# Patient Record
Sex: MALE | Race: WHITE | NOT HISPANIC OR LATINO | Employment: OTHER | URBAN - METROPOLITAN AREA
[De-identification: names, ages, dates, MRNs, and addresses within clinical notes are randomized per-mention and may not be internally consistent; named-entity substitution may affect disease eponyms.]

---

## 2018-02-01 DIAGNOSIS — E78.2 MIXED HYPERLIPIDEMIA: ICD-10-CM

## 2018-02-01 RX ORDER — FENOFIBRIC ACID 135 MG/1
1 CAPSULE, DELAYED RELEASE ORAL DAILY
Qty: 60 CAPSULE | Refills: 0 | Status: SHIPPED | OUTPATIENT
Start: 2018-02-01 | End: 2018-11-07

## 2018-02-01 NOTE — TELEPHONE ENCOUNTER
Patient is overdue for OV and fasting labs (due 12/30/17)  Med and pharm loaded for PCP  Reminder placed on RX  Manny Trevino, RN, BSN

## 2018-05-01 ENCOUNTER — OFFICE VISIT (OUTPATIENT)
Dept: URGENT CARE | Facility: URGENT CARE | Age: 63
End: 2018-05-01
Payer: COMMERCIAL

## 2018-05-01 VITALS
TEMPERATURE: 98.1 F | HEART RATE: 65 BPM | DIASTOLIC BLOOD PRESSURE: 94 MMHG | SYSTOLIC BLOOD PRESSURE: 141 MMHG | BODY MASS INDEX: 36.88 KG/M2 | WEIGHT: 257 LBS

## 2018-05-01 DIAGNOSIS — M10.9 GOUT OF LEFT FOOT, UNSPECIFIED CAUSE, UNSPECIFIED CHRONICITY: Primary | ICD-10-CM

## 2018-05-01 PROCEDURE — 99213 OFFICE O/P EST LOW 20 MIN: CPT | Performed by: PHYSICIAN ASSISTANT

## 2018-05-01 RX ORDER — INDOMETHACIN 50 MG/1
50 CAPSULE ORAL
Qty: 30 CAPSULE | Refills: 0 | Status: SHIPPED | OUTPATIENT
Start: 2018-05-01 | End: 2018-11-07

## 2018-05-01 NOTE — MR AVS SNAPSHOT
After Visit Summary   5/1/2018    Martin Flaherty    MRN: [id number]           Patient Information     Date Of Birth          1955        Visit Information        Provider Department      5/1/2018 5:50 PM Farrah Shaffer PA-C [facility]        Today's Diagnoses     Gout of left foot, unspecified cause, unspecified chronicity    -  1      Care Instructions      Gout Diet  Gout is a painful condition caused by an excess of uric acid, a waste product made by the body. Uric acid forms crystals that collect in the joints. The immune response to these crystals brings on symptoms of joint pain and swelling. This is called a gout attack. Often, medications and diet changes are combined to manage gout. Below are some guidelines for changing your diet to help you manage gout and prevent attacks. Your health care provider will help you determine the best eating plan for you.     Eating to manage gout  Weight loss for those who are overweight may help reduce gout attacks.  Eat less of these foods  Eating too many foods containing purines may raise the levels of uric acid in your body. This raises your risk for a gout attack. Try to limit these foods and drinks:    Alcohol, such as beer and red wine. You may be told to avoid alcohol completely.    Soft drinks that contain sugar or high fructose corn syrup    Certain fish, including anchovies, sardines, fish eggs, and herring    Shellfish    Certain meats, such as red meat, hot dogs, luncheon meats, and turkey    Organ meats, such as liver, kidneys, and sweetbreads    Legumes, such as dried beans and peas    Other high fat foods such as gravy, whole milk, and high fat cheeses    Vegetables such as asparagus, cauliflower, spinach, and mushrooms used to be thought to contribute to an increased risk for a gout attack, but recent studies show that high purine vegetables don't increase the risk for a gout attack.  Eat more  of these foods  Other foods may be helpful for people with gout. Add some of these foods to your diet:    Cherries contain chemicals that may lower uric acid.    Omega fatty acids. These are found in some fatty fish such as salmon, certain oils (flax, olive, or nut), and nuts themselves. Omega fatty acids may help prevent inflammation due to gout.    Dairy products that are low-fat or fat-free, such as cheese and yogurt    Complex carbohydrate foods, including whole grains, brown rice, oats, and beans    Coffee, in moderation    Water, approximately 64 ounces per day  Follow-up care  Follow up with your healthcare provider as advised.  When to seek medical advice  Call your healthcare provider right away if any of these occur:    Return of gout symptoms, usually at night:    Severe pain, swelling, and heat in a joint, especially the base of the big toe    Affected joint is hard to move    Skin of the affected joint is purple or red    Fever of 100.4 F (38 C) or higher    Pain that doesn't get better even with prescribed medicine   Date Last Reviewed: 1/12/2016 2000-2017 [employer]. [address] [place] [address]. All rights reserved. This information is not intended as a substitute for professional medical care. Always follow your healthcare professional's instructions.                Follow-ups after your visit        Who to contact     If you have questions or need follow up information about today's clinic visit or your schedule please contact [facility] directly at [phone].  Normal or non-critical lab and imaging results will be communicated to you by MyChart, letter or phone within 4 business days after the clinic has received the results. If you do not hear from us within 7 days, please contact the clinic through MyChart or phone. If you have a critical or abnormal lab result, we will notify you by phone as soon as possible.  Submit refill requests  "through [employer] or call your pharmacy and they will forward the refill request to us. Please allow 3 business days for your refill to be completed.          Additional Information About Your Visit        LPATHharSpruik Information     [employer] lets you send messages to your doctor, view your test results, renew your prescriptions, schedule appointments and more. To sign up, go to [email or web address]/[employer] . Click on \"Log in\" on the left side of the screen, which will take you to the Welcome page. Then click on \"Sign up Now\" on the right side of the page.     You will be asked to enter the access code listed below, as well as some personal information. Please follow the directions to create your username and password.     Your access code is: [email or web address]  Expires: 2018  7:23 PM     Your access code will  in 90 days. If you need help or a new code, please call your [facility] clinic or [phone].        Care EveryWhere ID     This is your Care EveryWhere ID. This could be used by other organizations to access your [facility] medical records  [id number]        Your Vitals Were     Pulse Temperature BMI (Body Mass Index)             65 98.1  F (36.7  C) (Tympanic) 36.88 kg/m2          Blood Pressure from Last 3 Encounters:   18 (!) 141/94   16 117/78   16 118/84    Weight from Last 3 Encounters:   18 257 lb (116.6 kg)   16 250 lb (113.4 kg)   16 249 lb 12.8 oz (113.3 kg)              Today, you had the following     No orders found for display         Today's Medication Changes          These changes are accurate as of 18  7:23 PM.  If you have any questions, ask your nurse or doctor.               Start taking these medicines.        Dose/Directions    indomethacin 50 MG capsule   Commonly known as:  INDOCIN   Used for:  Gout of left foot, unspecified cause, unspecified chronicity   Started by:  Farrah Shaffer PA-C        Dose:  50 mg   Take 1 capsule (50 mg) by " mouth 3 times daily (with meals)   Quantity:  30 capsule   Refills:  0            Where to get your medicines      These medications were sent to [id number] - [place], [place] - [address] [facility]  [address] [facility] [address]     Phone:  [phone]     indomethacin 50 MG capsule                Primary Care Provider Office Phone # Fax #    Shane Zamarripa [phone]       [facility] [address]  [facility] [address]        Equal Access to Services     [facility]: Hadii aad ku hadasho Soomaali, waaxda luqadaha, qaybta kaalmada adeegyada, waxay idiin hayaan adeeg kharash labuddy . So [facility] [place] [facility] [phone].    ATENCIÓN: Si habla español, tiene a parra disposición servicios gratuitos de asistencia lingüística. [facility] [phone].    We comply with applicable federal civil rights laws and [place] laws. We do not discriminate on the basis of race, color, national origin, age, disability, sex, sexual orientation, or gender identity.            Thank you!     Thank you for choosing [facility]  for your care. Our goal is always to provide you with excellent care. Hearing back from our patients is one way we can continue to improve our services. Please take a few minutes to complete the written survey that you may receive in the mail after your visit with us. Thank you!             Your Updated Medication List - Protect others around you: Learn how to safely use, store and throw away your medicines at www.disposemymeds.org.          This list is accurate as of 5/1/18  7:23 PM.  Always use your most recent med list.                   Brand Name Dispense Instructions for use Diagnosis    Fenofibric Acid 135 MG Cpdr     60 capsule    Take 1 capsule by mouth daily    Mixed hyperlipidemia       flecainide 50 MG tablet    TAMBOCOR    270 tablet    Take 1.5 tablets (75 mg) by mouth 2 times daily    Paroxysmal supraventricular  tachycardia (H)       indomethacin 50 MG capsule    INDOCIN    30 capsule    Take 1 capsule (50 mg) by mouth 3 times daily (with meals)    Gout of left foot, unspecified cause, unspecified chronicity       metoprolol succinate 25 MG 24 hr tablet    TOPROL-XL    90 tablet    Take 1 tablet (25 mg) by mouth daily    Paroxysmal supraventricular tachycardia (H)       omega-3 acid ethyl esters 1 g capsule    LOVAZA    360 capsule    Take 4 capsules by mouth daily with food    Hyperlipidemia LDL goal <130       omeprazole 20 MG CR capsule    priLOSEC    90 capsule    Take 1 capsule (20 mg) by mouth daily    Gastroesophageal reflux disease without esophagitis       simvastatin 40 MG tablet    ZOCOR    90 tablet    Take 1 tablet (40 mg) by mouth At Bedtime    Hyperlipidemia LDL goal <130

## 2018-05-02 NOTE — PATIENT INSTRUCTIONS
Gout Diet  Gout is a painful condition caused by an excess of uric acid, a waste product made by the body. Uric acid forms crystals that collect in the joints. The immune response to these crystals brings on symptoms of joint pain and swelling. This is called a gout attack. Often, medications and diet changes are combined to manage gout. Below are some guidelines for changing your diet to help you manage gout and prevent attacks. Your health care provider will help you determine the best eating plan for you.     Eating to manage gout  Weight loss for those who are overweight may help reduce gout attacks.  Eat less of these foods  Eating too many foods containing purines may raise the levels of uric acid in your body. This raises your risk for a gout attack. Try to limit these foods and drinks:    Alcohol, such as beer and red wine. You may be told to avoid alcohol completely.    Soft drinks that contain sugar or high fructose corn syrup    Certain fish, including anchovies, sardines, fish eggs, and herring    Shellfish    Certain meats, such as red meat, hot dogs, luncheon meats, and turkey    Organ meats, such as liver, kidneys, and sweetbreads    Legumes, such as dried beans and peas    Other high fat foods such as gravy, whole milk, and high fat cheeses    Vegetables such as asparagus, cauliflower, spinach, and mushrooms used to be thought to contribute to an increased risk for a gout attack, but recent studies show that high purine vegetables don't increase the risk for a gout attack.  Eat more of these foods  Other foods may be helpful for people with gout. Add some of these foods to your diet:    Cherries contain chemicals that may lower uric acid.    Omega fatty acids. These are found in some fatty fish such as salmon, certain oils (flax, olive, or nut), and nuts themselves. Omega fatty acids may help prevent inflammation due to gout.    Dairy products that are low-fat or fat-free, such as cheese and  yogurt    Complex carbohydrate foods, including whole grains, brown rice, oats, and beans    Coffee, in moderation    Water, approximately 64 ounces per day  Follow-up care  Follow up with your healthcare provider as advised.  When to seek medical advice  Call your healthcare provider right away if any of these occur:    Return of gout symptoms, usually at night:    Severe pain, swelling, and heat in a joint, especially the base of the big toe    Affected joint is hard to move    Skin of the affected joint is purple or red    Fever of 100.4 F (38 C) or higher    Pain that doesn't get better even with prescribed medicine   Date Last Reviewed: 1/12/2016 2000-2017 [employer]. [address] [place] [address], [place] [address]. All rights reserved. This information is not intended as a substitute for professional medical care. Always follow your healthcare professional's instructions.

## 2018-05-02 NOTE — PROGRESS NOTES
SUBJECTIVE:  Chief Complaint   Patient presents with     Foot Pain     left foot pain since last night - possible gout, per pt.      Martin Flaherty is a 62 year old male presents with a chief complaint of left heel pain for the past couple of weeks.  No trauma.\  Was seen by Ortho and had xrays, which were negative.  He subsequently started taking some leftover indomethacin and his symptoms improved.  Denies any trauma or fevers.    He ran out of the medication today.     Past Medical History:   Diagnosis Date     Atrial tachycardia (H)     nonsustained     Gout, unspecified      PAC (premature atrial contraction)     symptomatic     Paroxysmal atrial fibrillation (H) 2002    Dxd with lone afib in [place], [place], not anticoagulated , given Cardizem, and told to rtc if palpitations recur     Patient Active Problem List   Diagnosis     Gout     Hyperlipidemia with native TG > 500; LDL goal <130     Advanced directives, counseling/discussion     Social History   Substance Use Topics     Smoking status: Never Smoker     Smokeless tobacco: Never Used     Alcohol use 0.0 oz/week     0 Standard drinks or equivalent per week      Comment: 2 beers a week       ROS:  CONSTITUTIONAL:NEGATIVE for fever, chills, change in weight  INTEGUMENTARY/SKIN: NEGATIVE for worrisome rashes, moles or lesions  MUSCULOSKELETAL: as above  NEURO: NEGATIVE for weakness, dizziness or paresthesias  Review of systems negative except as stated above.    EXAM:   BP (!) 141/94  Pulse 65  Temp 98.1  F (36.7  C) (Tympanic)  Wt 257 lb (116.6 kg)  BMI 36.88 kg/m2  Gen: healthy,alert,no distress  Extremity: left foot: tenderness over plantar aspect of calcaneous.    No swelling.  No open wounds.     (M10.9) Gout of left foot, unspecified cause, unspecified chronicity  (primary encounter diagnosis)  Comment:   Plan: indomethacin (INDOCIN) 50 MG capsule        Handout on Gout diet given and reviewed.      F/U with PCP should symptoms persist or worsen.     Patient expresses understanding and agreement with the assessment and plan as above.

## 2018-06-20 DIAGNOSIS — I47.10 PAROXYSMAL SUPRAVENTRICULAR TACHYCARDIA (H): ICD-10-CM

## 2018-06-20 RX ORDER — FLECAINIDE ACETATE 50 MG/1
75 TABLET ORAL 2 TIMES DAILY
Qty: 270 TABLET | Refills: 0 | Status: SHIPPED | OUTPATIENT
Start: 2018-06-20 | End: 2018-09-18

## 2018-07-10 DIAGNOSIS — K21.9 GASTROESOPHAGEAL REFLUX DISEASE WITHOUT ESOPHAGITIS: ICD-10-CM

## 2018-09-18 DIAGNOSIS — I47.10 PAROXYSMAL SUPRAVENTRICULAR TACHYCARDIA (H): ICD-10-CM

## 2018-09-18 RX ORDER — FLECAINIDE ACETATE 50 MG/1
75 TABLET ORAL 2 TIMES DAILY
Qty: 88 TABLET | Refills: 0 | Status: SHIPPED | OUTPATIENT
Start: 2018-09-18 | End: 2018-11-07

## 2018-09-18 NOTE — TELEPHONE ENCOUNTER
Needs to be seen by Dr. Zamarripa for additional refills.  It is noted he has an appointment made in October already.

## 2018-10-03 ENCOUNTER — OFFICE VISIT (OUTPATIENT)
Dept: CARDIOLOGY | Facility: CLINIC | Age: 63
End: 2018-10-03
Payer: COMMERCIAL

## 2018-10-03 VITALS
DIASTOLIC BLOOD PRESSURE: 80 MMHG | BODY MASS INDEX: 36.97 KG/M2 | HEIGHT: 70 IN | SYSTOLIC BLOOD PRESSURE: 128 MMHG | HEART RATE: 72 BPM | WEIGHT: 258.2 LBS

## 2018-10-03 DIAGNOSIS — I47.10 PAROXYSMAL SUPRAVENTRICULAR TACHYCARDIA (H): Primary | ICD-10-CM

## 2018-10-03 PROCEDURE — 93000 ELECTROCARDIOGRAM COMPLETE: CPT | Performed by: INTERNAL MEDICINE

## 2018-10-03 PROCEDURE — 99214 OFFICE O/P EST MOD 30 MIN: CPT | Performed by: INTERNAL MEDICINE

## 2018-10-03 NOTE — LETTER
10/3/2018    Shane Zamarripa MD  [address]  [facility] [address]    RE: Martin Flaherty       Dear Colleague,    I had the pleasure of seeing Martin Flaherty in the [facility].    HPI and Plan:   See dictation    Orders Placed This Encounter   Procedures     EKG 12-lead complete w/read - Clinics (performed today)       No orders of the defined types were placed in this encounter.      Medications Discontinued During This Encounter   Medication Reason     metoprolol (TOPROL-XL) 25 MG 24 hr tablet Stopped by Patient         Encounter Diagnosis   Name Primary?     Paroxysmal supraventricular tachycardia (H) Yes       CURRENT MEDICATIONS:  Current Outpatient Prescriptions   Medication Sig Dispense Refill     Choline Fenofibrate (FENOFIBRIC ACID) 135 MG CPDR Take 1 capsule by mouth daily 60 capsule 0     flecainide (TAMBOCOR) 50 MG tablet Take 1.5 tablets (75 mg) by mouth 2 times daily 88 tablet 0     indomethacin (INDOCIN) 50 MG capsule Take 1 capsule (50 mg) by mouth 3 times daily (with meals) 30 capsule 0     omega-3 acid ethyl esters (LOVAZA) 1 G capsule Take 4 capsules by mouth daily with food 360 capsule 3     omeprazole (PRILOSEC) 20 MG CR capsule Take 1 capsule (20 mg) by mouth daily 90 capsule 0     simvastatin (ZOCOR) 40 MG tablet Take 1 tablet (40 mg) by mouth At Bedtime 90 tablet 3       ALLERGIES     Allergies   Allergen Reactions     No Known Drug Allergies        PAST MEDICAL HISTORY:  Past Medical History:   Diagnosis Date     Atrial tachycardia (H)     nonsustained     Gout, unspecified      PAC (premature atrial contraction)     symptomatic     Paroxysmal atrial fibrillation (H) 2002    Dxd with lone afib in [place], [place], not anticoagulated , given Cardizem, and told to rtc if palpitations recur       PAST SURGICAL HISTORY:  Past Surgical History:   Procedure Laterality Date     APPENDECTOMY            EP STUDY, MODIFIED  2/2011    Attempted ablation  "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       FAMILY HISTORY:  Family History   Problem Relation Age of Onset     Cancer Father       of cancer at age 49 in  with [place] to bones, pt can not recall primar tumor     Myocardial Infarction Mother        SOCIAL HISTORY:  Social History     Social History     Marital status:      Spouse name: Lesley     Number of children: 3     Years of education: 14     Occupational History     product support [employer]     Social History Main Topics     Smoking status: Never Smoker     Smokeless tobacco: Never Used     Alcohol use 0.0 oz/week     0 Standard drinks or equivalent per week      Comment: 2 beers a week     Drug use: No     Sexual activity: Not Asked     Other Topics Concern     Caffeine Concern No     Sleep Concern No     Stress Concern No     Weight Concern No     Special Diet No     Exercise Yes     walks daily     Social History Narrative       Review of Systems:  Skin:  Negative       Eyes:  Negative      ENT:  Negative      Respiratory:  Negative       Cardiovascular:  Negative      Gastroenterology: Negative      Genitourinary:  Negative      Musculoskeletal:  Negative      Neurologic:  Negative      Psychiatric:  Negative      Heme/Lymph/Imm:  Negative      Endocrine:  Negative        Physical Exam:  Vitals: /80  Pulse 72  Ht 1.778 m (5' 10\")  Wt 117.1 kg (258 lb 3.2 oz)  BMI 37.05 kg/m2    Constitutional:  cooperative, alert and oriented, well developed, well nourished, in no acute distress        Skin:  warm and dry to the touch, no apparent skin lesions or masses noted          Head:  normocephalic, no masses or lesions        Eyes:           Lymph:      ENT:  no pallor or cyanosis, dentition good        Neck:           Respiratory:  normal breath sounds, clear to auscultation, normal A-P diameter, normal symmetry, normal respiratory excursion, no use of accessory muscles         Cardiac: regular rhythm, normal S1/S2, no S3 or S4, " apical impulse not displaced, no murmurs, gallops or rubs                pulses full and equal, no bruits auscultated                                        GI:  abdomen soft, non-tender, BS normoactive, no mass, no HSM, no bruits        Extremities and Muscular Skeletal:  no deformities, clubbing, cyanosis, erythema observed              Neurological:           Psych:           CC  No referring provider defined for this encounter.                Service Date: 10/03/2018      HISTORY OF PRESENT ILLNESS:  I saw Mr. Flaherty for followup of atrial fibrillation, PACs and atrial tachycardia.  He is a 63-year-old white male who [place].  He presented to our clinic initially with symptomatic SVT and PACs.  The patient underwent EP study that was found to have atrial tachycardia from the AV node region.  He has been treated medically over the last 2 years with flecainide.  There has been no evidence of recurrent SVT, atrial fibrillation on flecainide therapy.  Symptomatically, he had some fatigue on metoprolol which has been discontinued.  He tolerates current dose of flecainide 75 mg b.i.d. well without side effects.      PHYSICAL EXAMINATION:   VITAL SIGNS:  Blood pressure was 128/80, heart rate 72 beats per minute, body weight 258 pounds.   HEENT:  Eyes and ENT were unremarkable.   LUNGS:  Clear.   CARDIAC:  Rhythm was regular and the heart sounds were normal with no murmur.   ABDOMEN:  Examination showed moderate obesity.   EXTREMITIES:  There was no pedal edema.      RADIOLOGIC STUDIES:  EKG showed normal sinus rhythm.      ASSESSMENT AND RECOMMENDATIONS:  Mr. Flaherty is doing well symptomatically.  He tolerates moderate dose of flecainide well.  He can continue current dose of flecainide.  He is scheduled to return for followup in 2 years.      The patient is encouraged to lose weight.      cc:   Shane Zamarripa MD    [facility]    [address]  [facility]   [place] [address]         KOFI POE MD             D: 10/03/2018   T: 10/03/2018   MT: MICHELL      Name:     NADINE GARCIA   MRN:      [id number]        Account:      [id number]   :      1955           Service Date: 10/03/2018      Document: [id number]        Thank you for allowing me to participate in the care of your patient.      Sincerely,     Kofi Poe MD     [facility]    cc:   No referring provider defined for this encounter.

## 2018-10-03 NOTE — MR AVS SNAPSHOT
After Visit Summary   10/3/2018    Martin Flaherty    MRN: [id number]           Patient Information     Date Of Birth          1955        Visit Information        Provider Department      10/3/2018 8:45 AM Kofi Poe MD [facility]        Today's Diagnoses     Paroxysmal supraventricular tachycardia (H)    -  1       Follow-ups after your visit        Additional Services     Follow-Up with Electrophysiologist                 Your next 10 appointments already scheduled     Oct 31, 2018  7:30 AM CDT   LAB with [facility]   [facility]    [address] [id number] [phone]           Please do not eat 10-12 hours before your appointment if you are coming in fasting for labs on lipids, cholesterol, or glucose (sugar). This does not apply to pregnant women. Water, hot tea and black coffee (with nothing added) are okay. Do not drink other fluids, diet soda or chew gum.            Nov 07, 2018  3:10 PM CST   Return Visit with Shane Zamarripa MD   [facility] ([facility])    [address]  [facility] [id number] [phone]              Future tests that were ordered for you today     Open Future Orders        Priority Expected Expires Ordered    Follow-Up with Electrophysiologist Routine 10/3/2020 2/15/2021 10/3/2018            Who to contact     If you have questions or need follow up information about today's clinic visit or your schedule please contact [facility] directly at [phone].  Normal or non-critical lab and imaging results will be communicated to you by MyChart, letter or phone within 4 business days after the clinic has received the results. If you do not hear from us within 7 days, please contact the clinic through [employer] or  "phone. If you have a critical or abnormal lab result, we will notify you by phone as soon as possible.  Submit refill requests through [employer] or call your pharmacy and they will forward the refill request to us. Please allow 3 business days for your refill to be completed.          Additional Information About Your Visit        Care EveryWhere ID     This is your Care EveryWhere ID. This could be used by other organizations to access your [place] medical records  [id number]        Your Vitals Were     Pulse Height BMI (Body Mass Index)             72 1.778 m (5' 10\") 37.05 kg/m2          Blood Pressure from Last 3 Encounters:   10/03/18 128/80   05/01/18 (!) 141/94   12/30/16 117/78    Weight from Last 3 Encounters:   10/03/18 117.1 kg (258 lb 3.2 oz)   05/01/18 116.6 kg (257 lb)   12/30/16 113.4 kg (250 lb)              We Performed the Following     EKG 12-lead complete w/read - Clinics (performed today)        Primary Care Provider Office Phone # Fax #    Ricardomckenna Mark Zamarripa [phone] [address]  [facility] [address]        Equal Access to Services     DARLENE HARRISON : Hadii aad ku hadasho Solisbetali, waaxda luqadaha, qaybta kaalmada adeegyada, kalpana smith . So [facility] [phone].    ATENCIÓN: Si habla español, tiene a parra disposición servicios gratuitos de asistencia lingüística. Llame al [phone].    We comply with applicable federal civil rights laws and [place] laws. We do not discriminate on the basis of race, color, national origin, age, disability, sex, sexual orientation, or gender identity.            Thank you!     Thank you for choosing [facility]  for your care. Our goal is always to provide you with excellent care. Hearing back from our patients is one way we can continue to improve our services. Please take a few minutes to complete the written survey that you may receive in the mail after " your visit with us. Thank you!             Your Updated Medication List - Protect others around you: Learn how to safely use, store and throw away your medicines at www.disposemymeds.org.          This list is accurate as of 10/3/18 11:59 PM.  Always use your most recent med list.                   Brand Name Dispense Instructions for use Diagnosis    Fenofibric Acid 135 MG Cpdr     60 capsule    Take 1 capsule by mouth daily    Mixed hyperlipidemia       flecainide 50 MG tablet    TAMBOCOR    88 tablet    Take 1.5 tablets (75 mg) by mouth 2 times daily    Paroxysmal supraventricular tachycardia (H)       indomethacin 50 MG capsule    INDOCIN    30 capsule    Take 1 capsule (50 mg) by mouth 3 times daily (with meals)    Gout of left foot, unspecified cause, unspecified chronicity       omega-3 acid ethyl esters 1 g capsule    LOVAZA    360 capsule    Take 4 capsules by mouth daily with food    Hyperlipidemia LDL goal <130       omeprazole 20 MG CR capsule    priLOSEC    90 capsule    Take 1 capsule (20 mg) by mouth daily    Gastroesophageal reflux disease without esophagitis       simvastatin 40 MG tablet    ZOCOR    90 tablet    Take 1 tablet (40 mg) by mouth At Bedtime    Hyperlipidemia LDL goal <130

## 2018-10-03 NOTE — LETTER
10/3/2018      Shane Zamarripa MD  [address] [facility] [address]  [facility] [address]      RE: Martin Flaherty       Dear Colleague,    I had the pleasure of seeing Martin Flaherty in the [facility].    Service Date: 10/03/2018      HISTORY OF PRESENT ILLNESS:  I saw Mr. Flaherty for followup of atrial fibrillation, PACs and atrial tachycardia.  He is a 63-year-old white male who [place].  He presented to our clinic initially with symptomatic SVT and PACs.  The patient underwent EP study that was found to have atrial tachycardia from the AV node region.  He has been treated medically over the last 2 years with flecainide.  There has been no evidence of recurrent SVT, atrial fibrillation on flecainide therapy.  Symptomatically, he had some fatigue on metoprolol which has been discontinued.  He tolerates current dose of flecainide 75 mg b.i.d. well without side effects.      PHYSICAL EXAMINATION:   VITAL SIGNS:  Blood pressure was 128/80, heart rate 72 beats per minute, body weight 258 pounds.   HEENT:  Eyes and ENT were unremarkable.   LUNGS:  Clear.   CARDIAC:  Rhythm was regular and the heart sounds were normal with no murmur.   ABDOMEN:  Examination showed moderate obesity.   EXTREMITIES:  There was no pedal edema.      RADIOLOGIC STUDIES:  EKG showed normal sinus rhythm.      ASSESSMENT AND RECOMMENDATIONS:  Mr. Flaherty is doing well symptomatically.  He tolerates moderate dose of flecainide well.  He can continue current dose of flecainide.  He is scheduled to return for followup in 2 years.      The patient is encouraged to lose weight.      cc:   Shane Zamarripa MD    [facility]    [address] [place] [address]         CHAD ADAIR MD             D: 10/03/2018   T: 10/03/2018   MT: MICHELL      Name:     MARTIN FLAHERTY   MRN:      [id number]        Account:      [id number]   :       1955           Service Date: 10/03/2018      Document: [id number]           Outpatient Encounter Prescriptions as of 10/3/2018   Medication Sig Dispense Refill     Choline Fenofibrate (FENOFIBRIC ACID) 135 MG CPDR Take 1 capsule by mouth daily 60 capsule 0     flecainide (TAMBOCOR) 50 MG tablet Take 1.5 tablets (75 mg) by mouth 2 times daily 88 tablet 0     indomethacin (INDOCIN) 50 MG capsule Take 1 capsule (50 mg) by mouth 3 times daily (with meals) 30 capsule 0     omega-3 acid ethyl esters (LOVAZA) 1 G capsule Take 4 capsules by mouth daily with food 360 capsule 3     omeprazole (PRILOSEC) 20 MG CR capsule Take 1 capsule (20 mg) by mouth daily 90 capsule 0     simvastatin (ZOCOR) 40 MG tablet Take 1 tablet (40 mg) by mouth At Bedtime 90 tablet 3     [DISCONTINUED] metoprolol (TOPROL-XL) 25 MG 24 hr tablet Take 1 tablet (25 mg) by mouth daily (Patient not taking: Reported on 10/3/2018) 90 tablet 3     No facility-administered encounter medications on file as of 10/3/2018.      Again, thank you for allowing me to participate in the care of your patient.      Sincerely,    Kofi Poe MD     [facility]

## 2018-10-03 NOTE — PROGRESS NOTES
HPI and Plan:   See dictation    Orders Placed This Encounter   Procedures     EKG 12-lead complete w/read - Clinics (performed today)       No orders of the defined types were placed in this encounter.      Medications Discontinued During This Encounter   Medication Reason     metoprolol (TOPROL-XL) 25 MG 24 hr tablet Stopped by Patient         Encounter Diagnosis   Name Primary?     Paroxysmal supraventricular tachycardia (H) Yes       CURRENT MEDICATIONS:  Current Outpatient Prescriptions   Medication Sig Dispense Refill     Choline Fenofibrate (FENOFIBRIC ACID) 135 MG CPDR Take 1 capsule by mouth daily 60 capsule 0     flecainide (TAMBOCOR) 50 MG tablet Take 1.5 tablets (75 mg) by mouth 2 times daily 88 tablet 0     indomethacin (INDOCIN) 50 MG capsule Take 1 capsule (50 mg) by mouth 3 times daily (with meals) 30 capsule 0     omega-3 acid ethyl esters (LOVAZA) 1 G capsule Take 4 capsules by mouth daily with food 360 capsule 3     omeprazole (PRILOSEC) 20 MG CR capsule Take 1 capsule (20 mg) by mouth daily 90 capsule 0     simvastatin (ZOCOR) 40 MG tablet Take 1 tablet (40 mg) by mouth At Bedtime 90 tablet 3       ALLERGIES     Allergies   Allergen Reactions     No Known Drug Allergies        PAST MEDICAL HISTORY:  Past Medical History:   Diagnosis Date     Atrial tachycardia (H)     nonsustained     Gout, unspecified      PAC (premature atrial contraction)     symptomatic     Paroxysmal atrial fibrillation (H)     Dxd with lone afib in [place], [place], not anticoagulated , given Cardizem, and told to rtc if palpitations recur       PAST SURGICAL HISTORY:  Past Surgical History:   Procedure Laterality Date     APPENDECTOMY            EP STUDY, MODIFIED  2011    Attempted ablation 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       FAMILY HISTORY:  Family History   Problem Relation Age of Onset     Cancer Father       of cancer at age 49 in 1972 with metrs to bones, pt can not recall primar tumor      "Myocardial Infarction Mother        SOCIAL HISTORY:  Social History     Social History     Marital status:      Spouse name: Lesley     Number of children: 3     Years of education: 14     Occupational History     product support [employer]     Social History Main Topics     Smoking status: Never Smoker     Smokeless tobacco: Never Used     Alcohol use 0.0 oz/week     0 Standard drinks or equivalent per week      Comment: 2 beers a week     Drug use: No     Sexual activity: Not Asked     Other Topics Concern     Caffeine Concern No     Sleep Concern No     Stress Concern No     Weight Concern No     Special Diet No     Exercise Yes     walks daily     Social History Narrative       Review of Systems:  Skin:  Negative       Eyes:  Negative      ENT:  Negative      Respiratory:  Negative       Cardiovascular:  Negative      Gastroenterology: Negative      Genitourinary:  Negative      Musculoskeletal:  Negative      Neurologic:  Negative      Psychiatric:  Negative      Heme/Lymph/Imm:  Negative      Endocrine:  Negative        Physical Exam:  Vitals: /80  Pulse 72  Ht 1.778 m (5' 10\")  Wt 117.1 kg (258 lb 3.2 oz)  BMI 37.05 kg/m2    Constitutional:  cooperative, alert and oriented, well developed, well nourished, in no acute distress        Skin:  warm and dry to the touch, no apparent skin lesions or masses noted          Head:  normocephalic, no masses or lesions        Eyes:           Lymph:      ENT:  no pallor or cyanosis, dentition good        Neck:           Respiratory:  normal breath sounds, clear to auscultation, normal A-P diameter, normal symmetry, normal respiratory excursion, no use of accessory muscles         Cardiac: regular rhythm, normal S1/S2, no S3 or S4, apical impulse not displaced, no murmurs, gallops or rubs                pulses full and equal, no bruits auscultated                                        GI:  abdomen soft, non-tender, BS normoactive, no mass, no HSM, no " bruits        Extremities and Muscular Skeletal:  no deformities, clubbing, cyanosis, erythema observed              Neurological:           Psych:           CC  No referring provider defined for this encounter.

## 2018-10-03 NOTE — PROGRESS NOTES
Service Date: 10/03/2018      HISTORY OF PRESENT ILLNESS:  I saw Mr. Flaherty for followup of atrial fibrillation, PACs and atrial tachycardia.  He is a 63-year-old white male who had [place]ys[place].  He presented to our clinic initially with symptomatic SVT and PACs.  The patient underwent EP study that was found to have atrial tachycardia from the AV node region.  He has been treated medically over the last 2 years with flecainide.  There has been no evidence of recurrent SVT, atrial fibrillation on flecainide therapy.  Symptomatically, he had some fatigue on metoprolol which has been discontinued.  He tolerates current dose of flecainide 75 mg b.i.d. well without side effects.      PHYSICAL EXAMINATION:   VITAL SIGNS:  Blood pressure was 128/80, heart rate 72 beats per minute, body weight 258 pounds.   HEENT:  Eyes and ENT were unremarkable.   LUNGS:  Clear.   CARDIAC:  Rhythm was regular and the heart sounds were normal with no murmur.   ABDOMEN:  Examination showed moderate obesity.   EXTREMITIES:  There was no pedal edema.      RADIOLOGIC STUDIES:  EKG showed normal sinus rhythm.      ASSESSMENT AND RECOMMENDATIONS:  Mr. Flaherty is doing well symptomatically.  He tolerates moderate dose of flecainide well.  He can continue current dose of flecainide.  He is scheduled to return for followup in 2 years.      The patient is encouraged to lose weight.      cc:   Shane Zamarripa MD    [facility]    [address] [place] [address]         CHAD ADAIR MD             D: 10/03/2018   T: 10/03/2018   MT: MICHELL      Name:     NADINE FLAHERTY   MRN:      [id number]        Account:      [id number]   :      1955           Service Date: 10/03/2018      Document: [id number]

## 2018-10-31 DIAGNOSIS — K21.9 GASTROESOPHAGEAL REFLUX DISEASE WITHOUT ESOPHAGITIS: ICD-10-CM

## 2018-10-31 DIAGNOSIS — N18.30 CKD (CHRONIC KIDNEY DISEASE) STAGE 3, GFR 30-59 ML/MIN (H): ICD-10-CM

## 2018-10-31 DIAGNOSIS — I47.10 PAROXYSMAL SUPRAVENTRICULAR TACHYCARDIA (H): ICD-10-CM

## 2018-10-31 DIAGNOSIS — E78.2 MIXED HYPERLIPIDEMIA: ICD-10-CM

## 2018-10-31 DIAGNOSIS — Z00.00 ROUTINE GENERAL MEDICAL EXAMINATION AT A HEALTH CARE FACILITY: ICD-10-CM

## 2018-10-31 LAB
ALBUMIN SERPL-MCNC: 4.1 G/DL (ref 3.4–5)
ALP SERPL-CCNC: 71 U/L (ref 40–150)
ALT SERPL W P-5'-P-CCNC: 65 U/L (ref 0–70)
ANION GAP SERPL CALCULATED.3IONS-SCNC: 7 MMOL/L (ref 3–14)
AST SERPL W P-5'-P-CCNC: 32 U/L (ref 0–45)
BASOPHILS # BLD AUTO: 0 10E9/L (ref 0–0.2)
BASOPHILS NFR BLD AUTO: 1 %
BILIRUB DIRECT SERPL-MCNC: 0.2 MG/DL (ref 0–0.2)
BILIRUB SERPL-MCNC: 1 MG/DL (ref 0.2–1.3)
BUN SERPL-MCNC: 14 MG/DL (ref 7–30)
CALCIUM SERPL-MCNC: 9 MG/DL (ref 8.5–10.1)
CHLORIDE SERPL-SCNC: 108 MMOL/L (ref 94–109)
CHOLEST SERPL-MCNC: 125 MG/DL
CO2 SERPL-SCNC: 28 MMOL/L (ref 20–32)
CREAT SERPL-MCNC: 1.29 MG/DL (ref 0.66–1.25)
DEPRECATED CALCIDIOL+CALCIFEROL SERPL-MC: 17 UG/L (ref 20–75)
DIFFERENTIAL METHOD BLD: ABNORMAL
EOSINOPHIL # BLD AUTO: 0.1 10E9/L (ref 0–0.7)
EOSINOPHIL NFR BLD AUTO: 2.2 %
ERYTHROCYTE [DISTWIDTH] IN BLOOD BY AUTOMATED COUNT: 12.6 % (ref 10–15)
GFR SERPL CREATININE-BSD FRML MDRD: 56 ML/MIN/1.7M2
GLUCOSE SERPL-MCNC: 107 MG/DL (ref 70–99)
HBA1C MFR BLD: 5.6 % (ref 0–5.6)
HCT VFR BLD AUTO: 43.2 % (ref 40–53)
HDLC SERPL-MCNC: 27 MG/DL
HGB BLD-MCNC: 14.7 G/DL (ref 13.3–17.7)
LDLC SERPL CALC-MCNC: 59 MG/DL
LYMPHOCYTES # BLD AUTO: 1 10E9/L (ref 0.8–5.3)
LYMPHOCYTES NFR BLD AUTO: 24.6 %
MAGNESIUM SERPL-MCNC: 2 MG/DL (ref 1.6–2.3)
MCH RBC QN AUTO: 29.3 PG (ref 26.5–33)
MCHC RBC AUTO-ENTMCNC: 34 G/DL (ref 31.5–36.5)
MCV RBC AUTO: 86 FL (ref 78–100)
MONOCYTES # BLD AUTO: 0.4 10E9/L (ref 0–1.3)
MONOCYTES NFR BLD AUTO: 8.5 %
NEUTROPHILS # BLD AUTO: 2.6 10E9/L (ref 1.6–8.3)
NEUTROPHILS NFR BLD AUTO: 63.7 %
NONHDLC SERPL-MCNC: 98 MG/DL
PHOSPHATE SERPL-MCNC: 2 MG/DL (ref 2.5–4.5)
PLATELET # BLD AUTO: 119 10E9/L (ref 150–450)
POTASSIUM SERPL-SCNC: 3.8 MMOL/L (ref 3.4–5.3)
PROT SERPL-MCNC: 7.6 G/DL (ref 6.8–8.8)
PSA SERPL-ACNC: 1.02 UG/L (ref 0–4)
PTH-INTACT SERPL-MCNC: 48 PG/ML (ref 18–80)
RBC # BLD AUTO: 5.01 10E12/L (ref 4.4–5.9)
SODIUM SERPL-SCNC: 143 MMOL/L (ref 133–144)
T3FREE SERPL-MCNC: 2.9 PG/ML (ref 2.3–4.2)
T4 FREE SERPL-MCNC: 0.93 NG/DL (ref 0.76–1.46)
TRIGL SERPL-MCNC: 195 MG/DL
TSH SERPL DL<=0.005 MIU/L-ACNC: 3.52 MU/L (ref 0.4–4)
WBC # BLD AUTO: 4.1 10E9/L (ref 4–11)

## 2018-10-31 PROCEDURE — 83036 HEMOGLOBIN GLYCOSYLATED A1C: CPT | Performed by: INTERNAL MEDICINE

## 2018-10-31 PROCEDURE — 84439 ASSAY OF FREE THYROXINE: CPT | Performed by: INTERNAL MEDICINE

## 2018-10-31 PROCEDURE — 82306 VITAMIN D 25 HYDROXY: CPT | Performed by: INTERNAL MEDICINE

## 2018-10-31 PROCEDURE — 83735 ASSAY OF MAGNESIUM: CPT | Performed by: INTERNAL MEDICINE

## 2018-10-31 PROCEDURE — 80061 LIPID PANEL: CPT | Performed by: INTERNAL MEDICINE

## 2018-10-31 PROCEDURE — 84100 ASSAY OF PHOSPHORUS: CPT | Performed by: INTERNAL MEDICINE

## 2018-10-31 PROCEDURE — 80048 BASIC METABOLIC PNL TOTAL CA: CPT | Performed by: INTERNAL MEDICINE

## 2018-10-31 PROCEDURE — G0103 PSA SCREENING: HCPCS | Performed by: INTERNAL MEDICINE

## 2018-10-31 PROCEDURE — 84481 FREE ASSAY (FT-3): CPT | Performed by: INTERNAL MEDICINE

## 2018-10-31 PROCEDURE — 80076 HEPATIC FUNCTION PANEL: CPT | Performed by: INTERNAL MEDICINE

## 2018-10-31 PROCEDURE — 85025 COMPLETE CBC W/AUTO DIFF WBC: CPT | Performed by: INTERNAL MEDICINE

## 2018-10-31 PROCEDURE — 83970 ASSAY OF PARATHORMONE: CPT | Performed by: INTERNAL MEDICINE

## 2018-10-31 PROCEDURE — 36415 COLL VENOUS BLD VENIPUNCTURE: CPT | Performed by: INTERNAL MEDICINE

## 2018-10-31 PROCEDURE — 84443 ASSAY THYROID STIM HORMONE: CPT | Performed by: INTERNAL MEDICINE

## 2018-11-07 ENCOUNTER — OFFICE VISIT (OUTPATIENT)
Dept: OTHER | Facility: CLINIC | Age: 63
End: 2018-11-07
Attending: INTERNAL MEDICINE
Payer: COMMERCIAL

## 2018-11-07 VITALS
WEIGHT: 258 LBS | OXYGEN SATURATION: 98 % | HEIGHT: 70 IN | RESPIRATION RATE: 16 BRPM | TEMPERATURE: 98.2 F | BODY MASS INDEX: 36.94 KG/M2 | SYSTOLIC BLOOD PRESSURE: 128 MMHG | DIASTOLIC BLOOD PRESSURE: 78 MMHG | HEART RATE: 78 BPM

## 2018-11-07 DIAGNOSIS — Z00.00 ROUTINE GENERAL MEDICAL EXAMINATION AT A HEALTH CARE FACILITY: Primary | ICD-10-CM

## 2018-11-07 DIAGNOSIS — M10.9 GOUT OF LEFT FOOT, UNSPECIFIED CAUSE, UNSPECIFIED CHRONICITY: ICD-10-CM

## 2018-11-07 DIAGNOSIS — E55.9 VITAMIN D DEFICIENCY: ICD-10-CM

## 2018-11-07 DIAGNOSIS — E78.5 HYPERLIPIDEMIA LDL GOAL <130: ICD-10-CM

## 2018-11-07 DIAGNOSIS — I47.10 PAROXYSMAL SUPRAVENTRICULAR TACHYCARDIA (H): ICD-10-CM

## 2018-11-07 DIAGNOSIS — K21.9 GASTROESOPHAGEAL REFLUX DISEASE WITHOUT ESOPHAGITIS: ICD-10-CM

## 2018-11-07 DIAGNOSIS — N18.30 CKD (CHRONIC KIDNEY DISEASE) STAGE 3, GFR 30-59 ML/MIN (H): ICD-10-CM

## 2018-11-07 PROCEDURE — G0463 HOSPITAL OUTPT CLINIC VISIT: HCPCS

## 2018-11-07 PROCEDURE — 99396 PREV VISIT EST AGE 40-64: CPT | Mod: ZP | Performed by: INTERNAL MEDICINE

## 2018-11-07 PROCEDURE — 99213 OFFICE O/P EST LOW 20 MIN: CPT | Mod: 25 | Performed by: INTERNAL MEDICINE

## 2018-11-07 RX ORDER — INDOMETHACIN 50 MG/1
50 CAPSULE ORAL 3 TIMES DAILY PRN
Qty: 30 CAPSULE | Refills: 0 | COMMUNITY
Start: 2018-11-07 | End: 2020-01-20

## 2018-11-07 RX ORDER — SIMVASTATIN 40 MG
40 TABLET ORAL AT BEDTIME
Qty: 90 TABLET | Refills: 3 | Status: SHIPPED | OUTPATIENT
Start: 2018-11-07 | End: 2020-04-13

## 2018-11-07 RX ORDER — FENOFIBRIC ACID 135 MG/1
1 CAPSULE, DELAYED RELEASE ORAL DAILY
Qty: 90 CAPSULE | Refills: 3 | Status: SHIPPED | OUTPATIENT
Start: 2018-11-07 | End: 2020-04-13

## 2018-11-07 RX ORDER — FLECAINIDE ACETATE 50 MG/1
75 TABLET ORAL 2 TIMES DAILY
Qty: 270 TABLET | Refills: 3 | Status: SHIPPED | OUTPATIENT
Start: 2018-11-07 | End: 2020-12-18

## 2018-11-07 RX ORDER — OMEGA-3-ACID ETHYL ESTERS 1 G/1
4 CAPSULE, LIQUID FILLED ORAL DAILY
Qty: 360 CAPSULE | Refills: 3 | Status: SHIPPED | OUTPATIENT
Start: 2018-11-07 | End: 2020-12-29

## 2018-11-07 NOTE — PROGRESS NOTES
SUBJECTIVE:    CC: Martin Flaherty is a 63 year old male who presents for:       Routine general medical examination at a health care facility  Mixed hyperlipidemia  Paroxysmal supraventricular tachycardia (H)  Gastroesophageal reflux disease without esophagitis  Hyperlipidemia LDL goal <130  CKD (chronic kidney disease) stage 3, GFR 30-59 ml/min (H)          HISTORIES:    PROBLEM LIST:                   Patient Active Problem List   Diagnosis     Gout     Hyperlipidemia with native TG > 500; LDL goal <130     Advanced directives, counseling/discussion       PAST MEDICAL HISTORY:                  Past Medical History:   Diagnosis Date     Atrial tachycardia (H)     nonsustained     Gout, unspecified      PAC (premature atrial contraction)     symptomatic     Paroxysmal atrial fibrillation (H) 2002    Dxd with lone afib in [place], [place], not anticoagulated , given Cardizem, and told to rtc if palpitations recur       PAST SURGICAL HISTORY:                  Past Surgical History:   Procedure Laterality Date     APPENDECTOMY            EP STUDY, MODIFIED  2/2011    Attempted ablation 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       CURRENT MEDICATIONS:                  Current Outpatient Prescriptions   Medication Sig Dispense Refill     Choline Fenofibrate (FENOFIBRIC ACID) 135 MG CPDR Take 1 capsule by mouth daily 60 capsule 0     flecainide (TAMBOCOR) 50 MG tablet Take 1.5 tablets (75 mg) by mouth 2 times daily 88 tablet 0     omega-3 acid ethyl esters (LOVAZA) 1 G capsule Take 4 capsules by mouth daily with food 360 capsule 3     omeprazole (PRILOSEC) 20 MG CR capsule Take 1 capsule (20 mg) by mouth daily 90 capsule 0     simvastatin (ZOCOR) 40 MG tablet Take 1 tablet (40 mg) by mouth At Bedtime 90 tablet 3     indomethacin (INDOCIN) 50 MG capsule Take 1 capsule (50 mg) by mouth 3 times daily (with meals) (Patient not taking: Reported on 11/7/2018) 30 capsule 0       ALLERGIES:                  Allergies    Allergen Reactions     No Known Drug Allergies        SOCIAL HISTORY:                  Social History     Social History     Marital status:      Spouse name: Lesley     Number of children: 3     Years of education: 14     Occupational History     product support [employer]     Social History Main Topics     Smoking status: Never Smoker     Smokeless tobacco: Never Used     Alcohol use 0.0 oz/week     0 Standard drinks or equivalent per week      Comment: 2 beers a week     Drug use: No     Sexual activity: Not on file     Other Topics Concern     Caffeine Concern No     Sleep Concern No     Stress Concern No     Weight Concern No     Special Diet No     Exercise Yes     walks daily     Social History Narrative       FAMILY HISTORY:                   Family History   Problem Relation Age of Onset     Cancer Father       of cancer at age 49 in  with metrs to bones, pt can not recall primar tumor     Myocardial Infarction Mother                              REVIEW OF SYSTEMS:  CONSTITUTIONAL:no malaise, fatigue, or other general symptoms  EYES: no subjective changes in visual acuity, no photophobia  ENT/MOUTH: no complaints of rhinorrhea, nasal congestion, sore throat, hearing changes  RESP:no SOB  CV: no c/o exertional chest pressure or CALDERON  GI: No abdominal pain, constipation, change in bowel movements, nausea, pyrosis, BRBPR  :no polyuria or polydipsia, no dysuria, no gross hematuria  MUSCULOSKELATAL:no arthalgias or myalgias  INTEGUMENTARY/SKIN: no pruritis, rashes, or moles with recent change in size, shape, or pigmentation  BREAST: no lumps, masses, or discharges  NEURO: no gross sensory or motor symptoms, no dizziness, no confusion  ENDOCRINE: no polyuria or polydipsia, no heat or cold intolerance  HEME/ALLERGY/IMMUNE: no fevers, chills, night sweats, or unwanted weight loss  PSYCHIATRIC: no depression, anxiety, or internal stimuli    EXAM:    BP (!) 137/93  Pulse 78  Temp 98.2  F (36.8  C)  " Resp 16  Ht 5' 10\" (1.778 m)  Wt 258 lb (117 kg)  SpO2 98%  BMI 37.02 kg/m2  BMI: Body mass index is 37.02 kg/(m^2).  GENERAL APPEARANCE: healthy, alert and no distress   EXAM:  EYES: clear conjunctiva, no cataracts, no obvious fundoscopic abnormalities  HENT: oropharynx, nares, and TMs are WNL  NECK: no JVD, thyromegaly or lymphadenopathy, no cervical bruits  RESP: clear to auscultation without rales, wheezes, or rhonchi  CV: RRR, no murmurs, gallops, or rubs  LYMPH: no cervical , axillary, or inguinal lymphadenopathy appreciated  GI: NABS, ND/NT, no masses or organomegally appreciated  : normal external genitalia and anus, no lumps, masses, or tenderness noted on palpation of the normally sized prostate  MS: no obvoius clinicallly relevant arthropathy, no evidence vasculitis  SKIN: no nevi clinically suspicious for malignancy are noted  NEURO: CN II-XII intact, no localizing sensory or motor abnoramlities noted, DTRs symmetrical bilaterally  PSYCH: Mental status exam reveals the pt to have normal mood and affect. There is no disorder of thought form or content. There is no response to internal stimuli. There is no suicidal or homicidal ideation.           (Z00.00) Routine general medical examination at a health care facility  (primary encounter diagnosis)  Comment: RTC one year, colonosocpy due in 2021  Plan: CBC with platelets differential, T3 Free,         Follow-Up with Vascular Medicine, T4 free, TSH,        Basic metabolic panel, Hepatic panel, Lipid         Profile, Hemoglobin A1c, Albumin Random Urine         Quantitative with Creat Ratio, Prostate spec         antigen screen, Parathyroid Hormone Intact,         Vitamin D Deficiency, Magnesium, Phosphorus,         Erythropoietin, Uric acid           (I47.1) Paroxysmal supraventricular tachycardia (H)  Comment: this is managed by Dr. Poe  Plan: flecainide (TAMBOCOR) 50 MG tablet,         OFFICE/OUTPT VISIT,EST,LEVL III, Follow-Up with        Vascular " Medicine            (K21.9) Gastroesophageal reflux disease without esophagitis  Comment: doing well  Plan: omeprazole (PRILOSEC) 20 MG CR capsule,         OFFICE/OUTPT VISIT,EST,LEVL III, CBC with         platelets differential, Follow-Up with Vascular        Medicine           (E78.5) Hyperlipidemia with native TG > 500; LDL goal <130  Comment: needs all of the below  Plan: omega-3 acid ethyl esters (LOVAZA) 1 g capsule,        simvastatin (ZOCOR) 40 MG tablet, OFFICE/OUTPT         VISIT,EST,LEVL III, Follow-Up with Vascular         Medicine, T4 free, TSH, Basic metabolic panel,         Lipid Profile, Hemoglobin A1c            (N18.3) CKD (chronic kidney disease) stage 3, GFR 30-59 ml/min (H)  Comment: Cr stable  Plan: OFFICE/OUTPT VISIT,EST,LEVL III, CBC with         platelets differential, Follow-Up with Vascular        Medicine, Basic metabolic panel, Hepatic panel,        Lipid Profile, Hemoglobin A1c, Albumin Random         Urine Quantitative with Creat Ratio,         Parathyroid Hormone Intact, Vitamin D         Deficiency, Magnesium, Phosphorus,         Erythropoietin, Uric acid           (M10.9) Gout of left foot, unspecified cause, unspecified chronicity  Comment: needs indocin only during outbreaks  Plan: indomethacin (INDOCIN) 50 MG capsule,         OFFICE/OUTPT VISIT,EST,LEVL III, Follow-Up with        Vascular Medicine, Uric acid           (E55.9) Vitamin D deficiency  Comment: start the below  Plan: cholecalciferol (VITAMIN D3) 16135 units         capsule, CBC with platelets differential,         Follow-Up with Vascular Medicine           Greater than one half the 15 minutes not spent on preventive care during this visit was spent providing aducation and counselling regarding item(s) # 2 onward as delineated above.     RTC one year.                            I have discussed with patient the risks, benefits, medications, treatment options and modalities.   I have instructed the patient to call or  schedule a follow-up appointment if any problems or failure to improve.

## 2018-11-07 NOTE — NURSING NOTE
"Martin Flaherty is a 63 year old male who presents for:  Chief Complaint   Patient presents with     RECHECK     follow up labs         Vitals:    Vitals:    11/07/18 1510   BP: (!) 137/93   Pulse: 78   Resp: 16   Temp: 98.2  F (36.8  C)   SpO2: 98%   Weight: 258 lb (117 kg)   Height: 5' 10\" (1.778 m)       BMI:  Estimated body mass index is 37.02 kg/(m^2) as calculated from the following:    Height as of this encounter: 5' 10\" (1.778 m).    Weight as of this encounter: 258 lb (117 kg).    Pain Score:  Data Unavailable        Mira Trevino      "

## 2018-11-07 NOTE — MR AVS SNAPSHOT
After Visit Summary   11/7/2018    Martin Flaherty    MRN: [id number]           Patient Information     Date Of Birth          1955        Visit Information        Provider Department      11/7/2018 3:10 PM Shane Zamarripa MD [facility]      Today's Diagnoses     Routine general medical examination at a health care facility    -  1    Paroxysmal supraventricular tachycardia (H)        Gastroesophageal reflux disease without esophagitis        Hyperlipidemia with native TG > 500; LDL goal <130        CKD (chronic kidney disease) stage 3, GFR 30-59 ml/min (H)        Gout of left foot, unspecified cause, unspecified chronicity        Vitamin D deficiency           Follow-ups after your visit        Additional Services     Follow-Up with Vascular Medicine                 Future tests that were ordered for you today     Open Future Orders        Priority Expected Expires Ordered    Prostate spec antigen screen Routine 11/7/2019 11/7/2019 11/7/2018    Parathyroid Hormone Intact Routine 11/7/2019 11/7/2019 11/7/2018    Vitamin D Deficiency Routine 11/7/2019 11/7/2019 11/7/2018    Magnesium Routine 11/7/2019 11/7/2019 11/7/2018    Phosphorus Routine 11/7/2019 11/7/2019 11/7/2018    Erythropoietin Routine 11/7/2019 11/7/2019 11/7/2018    Uric acid Routine 11/7/2019 11/7/2019 11/7/2018    CBC with platelets differential Routine 11/7/2019 11/7/2019 11/7/2018    T3 Free Routine 11/7/2019 11/7/2019 11/7/2018    Follow-Up with Vascular Medicine Routine 11/7/2019 11/7/2019 11/7/2018    T4 free Routine 11/7/2019 11/7/2019 11/7/2018    TSH Routine 11/7/2019 11/7/2019 11/7/2018    Basic metabolic panel Routine 11/7/2019 11/7/2019 11/7/2018    Hepatic panel Routine 11/7/2019 11/7/2019 11/7/2018    Lipid Profile Routine 11/7/2019 11/7/2019 11/7/2018    Hemoglobin A1c Routine 11/7/2019 11/7/2019 11/7/2018    Albumin Random Urine  "Quantitative with Creat Ratio Routine 11/7/2019 11/7/2019 11/7/2018            Who to contact     If you have questions or need follow up information about today's clinic visit or your schedule please contact [facility] directly at [phone].  Normal or non-critical lab and imaging results will be communicated to you by MyChart, letter or phone within 4 business days after the clinic has received the results. If you do not hear from us within 7 days, please contact the clinic through MyChart or phone. If you have a critical or abnormal lab result, we will notify you by phone as soon as possible.  Submit refill requests through [employer] or call your pharmacy and they will forward the refill request to us. Please allow 3 business days for your refill to be completed.          Additional Information About Your Visit        Care EveryWhere ID     This is your Care EveryWhere ID. This could be used by other organizations to access your [facility] medical records  [id number]        Your Vitals Were     Pulse Temperature Respirations Height Pulse Oximetry BMI (Body Mass Index)    78 98.2  F (36.8  C) 16 5' 10\" (1.778 m) 98% 37.02 kg/m2       Blood Pressure from Last 3 Encounters:   11/07/18 128/78   10/03/18 128/80   05/01/18 (!) 141/94    Weight from Last 3 Encounters:   11/07/18 258 lb (117 kg)   10/03/18 258 lb 3.2 oz (117.1 kg)   05/01/18 257 lb (116.6 kg)              We Performed the Following     Follow-Up with Vascular Medicine     OFFICE/OUTPT VISIT,EST,LEVL III          Today's Medication Changes          These changes are accurate as of 11/7/18  3:36 PM.  If you have any questions, ask your nurse or doctor.               Start taking these medicines.        Dose/Directions    cholecalciferol 59223 units capsule   Commonly known as:  VITAMIN D3   Used for:  Vitamin D deficiency   Started by:  Shane Zamarripa MD        Dose:  1 capsule   Take 1 capsule (50,000 Units) by mouth " once a week   Quantity:  13 capsule   Refills:  3         These medicines have changed or have updated prescriptions.        Dose/Directions    indomethacin 50 MG capsule   Commonly known as:  INDOCIN   This may have changed:    - when to take this  - reasons to take this   Used for:  Gout of left foot, unspecified cause, unspecified chronicity   Changed by:  Shane Zamarripa MD        Dose:  50 mg   Take 1 capsule (50 mg) by mouth 3 times daily as needed for moderate pain (gout)   Quantity:  30 capsule   Refills:  0            Where to get your medicines      These medications were sent to [id number] - [place], [place] [facility] [address] [address]  [address] [address]     Phone:  [phone]     cholecalciferol 45216 units capsule    Fenofibric Acid 135 MG Cpdr    flecainide 50 MG tablet    omega-3 acid ethyl esters 1 g capsule    omeprazole 20 MG CR capsule    simvastatin 40 MG tablet                Primary Care Provider Office Phone # Fax #    Shane Zamarripa [phone] [address]  [facility] [address]        Equal Access to Services     [facility] AH: Hadii aad ku hadasho Soomaali, waaxda luqadaha, qaybta kaalmada adeegyada, waxay nguyễn smith . So [facility] [phone].    ATENCIÓN: Si habla español, tiene a parra disposición servicios gratuitos de asistencia lingüística. [facility] [phone].    We comply with applicable federal civil rights laws and [place] laws. We do not discriminate on the basis of race, color, national origin, age, disability, sex, sexual orientation, or gender identity.            Thank you!     Thank you for choosing [facility]  for your care. Our goal is always to provide you with excellent care. Hearing back from our patients is one way we can continue to improve our services. Please take a few minutes to complete the written survey that you may receive in the  mail after your visit with us. Thank you!             Your Updated Medication List - Protect others around you: Learn how to safely use, store and throw away your medicines at www.disposemymeds.org.          This list is accurate as of 11/7/18  3:36 PM.  Always use your most recent med list.                   Brand Name Dispense Instructions for use Diagnosis    cholecalciferol 74474 units capsule    VITAMIN D3    13 capsule    Take 1 capsule (50,000 Units) by mouth once a week    Vitamin D deficiency       Fenofibric Acid 135 MG Cpdr     90 capsule    Take 1 capsule by mouth daily        flecainide 50 MG tablet    TAMBOCOR    270 tablet    Take 1.5 tablets (75 mg) by mouth 2 times daily    Paroxysmal supraventricular tachycardia (H)       indomethacin 50 MG capsule    INDOCIN    30 capsule    Take 1 capsule (50 mg) by mouth 3 times daily as needed for moderate pain (gout)    Gout of left foot, unspecified cause, unspecified chronicity       omega-3 acid ethyl esters 1 g capsule    LOVAZA    360 capsule    Take 4 capsules by mouth daily with food    Hyperlipidemia LDL goal <130       omeprazole 20 MG CR capsule    priLOSEC    90 capsule    Take 1 capsule (20 mg) by mouth daily    Gastroesophageal reflux disease without esophagitis       simvastatin 40 MG tablet    ZOCOR    90 tablet    Take 1 tablet (40 mg) by mouth At Bedtime    Hyperlipidemia LDL goal <130

## 2019-04-15 DIAGNOSIS — K21.9 GASTROESOPHAGEAL REFLUX DISEASE WITHOUT ESOPHAGITIS: ICD-10-CM

## 2019-05-08 ENCOUNTER — HOSPITAL ENCOUNTER (EMERGENCY)
Facility: CLINIC | Age: 64
Discharge: HOME | End: 2019-05-08
Attending: EMERGENCY MEDICINE | Admitting: EMERGENCY MEDICINE
Payer: COMMERCIAL

## 2019-05-08 ENCOUNTER — APPOINTMENT (OUTPATIENT)
Dept: GENERAL RADIOLOGY | Facility: CLINIC | Age: 64
End: 2019-05-08
Attending: EMERGENCY MEDICINE
Payer: COMMERCIAL

## 2019-05-08 ENCOUNTER — APPOINTMENT (OUTPATIENT)
Dept: CT IMAGING | Facility: CLINIC | Age: 64
End: 2019-05-08
Attending: EMERGENCY MEDICINE
Payer: COMMERCIAL

## 2019-05-08 VITALS
DIASTOLIC BLOOD PRESSURE: 92 MMHG | BODY MASS INDEX: 35.07 KG/M2 | RESPIRATION RATE: 16 BRPM | HEIGHT: 70 IN | SYSTOLIC BLOOD PRESSURE: 139 MMHG | TEMPERATURE: 98.1 F | HEART RATE: 60 BPM | WEIGHT: 245 LBS | OXYGEN SATURATION: 95 %

## 2019-05-08 DIAGNOSIS — R07.89 CHEST WALL PAIN: ICD-10-CM

## 2019-05-08 DIAGNOSIS — R93.89 ABNORMAL FINDING ON RADIOLOGY EXAM: ICD-10-CM

## 2019-05-08 LAB
ALBUMIN SERPL-MCNC: 4.1 G/DL (ref 3.4–5)
ALP SERPL-CCNC: 90 U/L (ref 40–150)
ALT SERPL W P-5'-P-CCNC: 56 U/L (ref 0–70)
ANION GAP SERPL CALCULATED.3IONS-SCNC: 6 MMOL/L (ref 3–14)
AST SERPL W P-5'-P-CCNC: 22 U/L (ref 0–45)
BASOPHILS # BLD AUTO: 0 10E9/L (ref 0–0.2)
BASOPHILS NFR BLD AUTO: 0.6 %
BILIRUB SERPL-MCNC: 0.7 MG/DL (ref 0.2–1.3)
BUN SERPL-MCNC: 12 MG/DL (ref 7–30)
CALCIUM SERPL-MCNC: 8.5 MG/DL (ref 8.5–10.1)
CHLORIDE SERPL-SCNC: 108 MMOL/L (ref 94–109)
CO2 SERPL-SCNC: 27 MMOL/L (ref 20–32)
CREAT SERPL-MCNC: 1.07 MG/DL (ref 0.66–1.25)
DIFFERENTIAL METHOD BLD: ABNORMAL
EOSINOPHIL # BLD AUTO: 0.1 10E9/L (ref 0–0.7)
EOSINOPHIL NFR BLD AUTO: 2.1 %
ERYTHROCYTE [DISTWIDTH] IN BLOOD BY AUTOMATED COUNT: 12.5 % (ref 10–15)
GFR SERPL CREATININE-BSD FRML MDRD: 74 ML/MIN/{1.73_M2}
GLUCOSE SERPL-MCNC: 97 MG/DL (ref 70–99)
HCT VFR BLD AUTO: 42.4 % (ref 40–53)
HGB BLD-MCNC: 15.1 G/DL (ref 13.3–17.7)
IMM GRANULOCYTES # BLD: 0 10E9/L (ref 0–0.4)
IMM GRANULOCYTES NFR BLD: 0.2 %
INTERPRETATION ECG - MUSE: NORMAL
LIPASE SERPL-CCNC: 77 U/L (ref 73–393)
LYMPHOCYTES # BLD AUTO: 0.9 10E9/L (ref 0.8–5.3)
LYMPHOCYTES NFR BLD AUTO: 19.5 %
MCH RBC QN AUTO: 30.2 PG (ref 26.5–33)
MCHC RBC AUTO-ENTMCNC: 35.6 G/DL (ref 31.5–36.5)
MCV RBC AUTO: 85 FL (ref 78–100)
MONOCYTES # BLD AUTO: 0.5 10E9/L (ref 0–1.3)
MONOCYTES NFR BLD AUTO: 9.4 %
NEUTROPHILS # BLD AUTO: 3.3 10E9/L (ref 1.6–8.3)
NEUTROPHILS NFR BLD AUTO: 68.2 %
NRBC # BLD AUTO: 0 10*3/UL
NRBC BLD AUTO-RTO: 0 /100
PLATELET # BLD AUTO: 128 10E9/L (ref 150–450)
POTASSIUM SERPL-SCNC: 3.9 MMOL/L (ref 3.4–5.3)
PROT SERPL-MCNC: 7.6 G/DL (ref 6.8–8.8)
RBC # BLD AUTO: 5 10E12/L (ref 4.4–5.9)
SODIUM SERPL-SCNC: 141 MMOL/L (ref 133–144)
TROPONIN I SERPL-MCNC: <0.015 UG/L (ref 0–0.04)
WBC # BLD AUTO: 4.8 10E9/L (ref 4–11)

## 2019-05-08 PROCEDURE — 85025 COMPLETE CBC W/AUTO DIFF WBC: CPT | Performed by: EMERGENCY MEDICINE

## 2019-05-08 PROCEDURE — 71260 CT THORAX DX C+: CPT

## 2019-05-08 PROCEDURE — 84484 ASSAY OF TROPONIN QUANT: CPT | Performed by: EMERGENCY MEDICINE

## 2019-05-08 PROCEDURE — 71101 X-RAY EXAM UNILAT RIBS/CHEST: CPT | Mod: LT

## 2019-05-08 PROCEDURE — 83690 ASSAY OF LIPASE: CPT | Performed by: EMERGENCY MEDICINE

## 2019-05-08 PROCEDURE — 25000125 ZZHC RX 250: Performed by: EMERGENCY MEDICINE

## 2019-05-08 PROCEDURE — 99285 EMERGENCY DEPT VISIT HI MDM: CPT | Mod: 25

## 2019-05-08 PROCEDURE — 80053 COMPREHEN METABOLIC PANEL: CPT | Performed by: EMERGENCY MEDICINE

## 2019-05-08 PROCEDURE — 93005 ELECTROCARDIOGRAM TRACING: CPT

## 2019-05-08 PROCEDURE — 25000128 H RX IP 250 OP 636: Performed by: EMERGENCY MEDICINE

## 2019-05-08 RX ORDER — IOPAMIDOL 755 MG/ML
80 INJECTION, SOLUTION INTRAVASCULAR ONCE
Status: COMPLETED | OUTPATIENT
Start: 2019-05-08 | End: 2019-05-08

## 2019-05-08 RX ADMIN — SODIUM CHLORIDE 100 ML: 9 INJECTION, SOLUTION INTRAVENOUS at 20:17

## 2019-05-08 RX ADMIN — IOPAMIDOL 80 ML: 755 INJECTION, SOLUTION INTRAVENOUS at 20:17

## 2019-05-08 ASSESSMENT — ENCOUNTER SYMPTOMS
MYALGIAS: 1
COLOR CHANGE: 0

## 2019-05-08 ASSESSMENT — MIFFLIN-ST. JEOR: SCORE: 1917.56

## 2019-05-08 NOTE — ED AVS SNAPSHOT
Emergency Department  [address] [address]  Phone:  [phone]  Fax:  [phone]                                    Martin Flaherty   MRN: [id number]    Department:   Emergency Department   Date of Visit:  5/8/2019           After Visit Summary Signature Page    I have received my discharge instructions, and my questions have been answered. I have discussed any challenges I see with this plan with the nurse or doctor.    ..........................................................................................................................................  Patient/Patient Representative Signature      ..........................................................................................................................................  Patient Representative Print Name and Relationship to Patient    ..................................................               ................................................  Date                                   Time    ..........................................................................................................................................  Reviewed by Signature/Title    ...................................................              ..............................................  Date                                               Time          22EPIC Rev 08/18
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
## 2019-05-09 NOTE — DISCHARGE INSTRUCTIONS
Discharge Instructions  Incidental Findings    An incidental finding is something unexpected that was found while you were being treated and is felt to not be related to the reason that you came to the Emergency Department.  While this finding is not an emergency, you need to follow up with your primary provider (or occasionally a specialist) to determine if anything should be done about it.    These findings can come from:  Checking your vital signs (example: high blood pressure).  Taking your history (example: unexplained weight loss).  The physical exam (example: a heart murmur).  Laboratory study (example: anemia or low blood count).  X-rays/ultrasound/CT or other imaging (example: an unexplained mass).    Generally, every Emergency Department visit should have a follow-up clinic visit with either a primary or a specialty clinic/provider. Please follow-up as instructed by your emergency provider today.    Return to the Emergency Department if:  Your condition worsens.  You develop unexpected pain.  You now develop new symptoms or have new concerns.  If you were given a prescription for medicine here today, be sure to read all of the information (including the package insert) that comes with your prescription.  This will include important information about the medicine, its side effects, and any warnings that you need to know about.  The pharmacist who fills the prescription can provide more information and answer questions you may have about the medicine.  If you have questions or concerns that the pharmacist cannot address, please call or return to the Emergency Department.   Remember that you can always come back to the Emergency Department if you are not able to see your regular provider in the amount of time listed above, if you get any new symptoms, or if there is anything that worries you.

## 2019-06-11 ENCOUNTER — OFFICE VISIT (OUTPATIENT)
Dept: URGENT CARE | Facility: URGENT CARE | Age: 64
End: 2019-06-11
Payer: COMMERCIAL

## 2019-06-11 VITALS
OXYGEN SATURATION: 95 % | SYSTOLIC BLOOD PRESSURE: 133 MMHG | HEART RATE: 77 BPM | WEIGHT: 254 LBS | DIASTOLIC BLOOD PRESSURE: 90 MMHG | BODY MASS INDEX: 36.45 KG/M2 | RESPIRATION RATE: 16 BRPM

## 2019-06-11 DIAGNOSIS — M70.21 OLECRANON BURSITIS, RIGHT ELBOW: Primary | ICD-10-CM

## 2019-06-11 PROCEDURE — 99214 OFFICE O/P EST MOD 30 MIN: CPT | Performed by: FAMILY MEDICINE

## 2019-06-11 RX ORDER — CEPHALEXIN 500 MG/1
500 CAPSULE ORAL 4 TIMES DAILY
Qty: 40 CAPSULE | Refills: 0 | Status: SHIPPED | OUTPATIENT
Start: 2019-06-11 | End: 2019-06-21

## 2019-06-11 RX ORDER — NAPROXEN 500 MG/1
500 TABLET ORAL 2 TIMES DAILY WITH MEALS
Qty: 14 TABLET | Refills: 0 | Status: SHIPPED | OUTPATIENT
Start: 2019-06-11 | End: 2019-06-18

## 2019-06-12 NOTE — PATIENT INSTRUCTIONS
Patient Education     Bursitis of the Elbow (Olecranon)  Your elbow joint contains a small fluid-filled sac called a bursa. The bursa helps the muscles and tendons move smoothly over the bone. It also cushions and protects your elbow. Bursitis is when the bursa is inflamed or swollen. This is most often due to overuse of or injury to the elbow. Symptoms include swelling and pain. If the elbow is red and feels warm to the touch, the bursa itself may be infected.  In most cases, elbow bursitis resolves with medicine and self-care at home. It may take several weeks for the bursa to heal and the swelling to go away. In some cases, your healthcare provider may drain excess fluid from the bursa. Or, he or she may inject medicine directly into the bursa to help relieve symptoms. In severe cases, you may need surgery to remove the bursa may. If there is concern that the bursa is infected, your healthcare provider may prescribe antibiotics to treat the infection.    Home care  Your healthcare provider may prescribe medicine to help relieve pain and swelling. This may be an over-the-counter pain reliever or prescription pain medicine. Take all medicines as directed. To help treat or prevent infection, your provider may prescribe antibiotics. If these are prescribed, take them as directed until they are gone.  The following are general care guidelines:    Apply an ice pack or bag of frozen peas wrapped in a thin towel to your elbow for 15 to 20 minutes at a time. Do this 3 to 4 times a day until pain and swelling improve.    Keep your elbow raised above the level of your heart whenever possible. This helps reduce swelling. When sitting or lying down, place your arm on a pillow that rests on your chest or on a pillow at your side.    Use an elastic wrap around the elbow joint to compress the area while it is healing. Make the wrap snug but not tight to the point of causing pain.    Rest your elbow to give it time to heal. You  may need to wear an elbow pad to help protect and limit the movement of your elbow. During and after healing, avoid leaning on your elbows.  Follow-up care  Follow up with your healthcare provider, or as advised. If you have been referred to a specialist, make that appointment promptly.  When to seek medical advice  Call your healthcare provider right away if any of these occur:    Fever of 100.4 F (38 C) or higher, or as advised    Chills    Increased pain, swelling, warmth, redness, or drainage from the joint    Trouble moving the elbow joint    Numbness or tingling in the hand    Severe pain or swelling in forearm or hand    Loss of pink color and slow return of color after squeezing fingertip or hand  Date Last Reviewed: 6/1/2016 2000-2018 [employer] [address] [place] [address], [place] [address]. All rights reserved. This information is not intended as a substitute for professional medical care. Always follow your healthcare professional's instructions.

## 2019-06-17 NOTE — PROGRESS NOTES
SUBJECTIVE:  Chief Complaint   Patient presents with     Mass     Pt states he has a swollen R elbow that he noticed 2 months ago has some fluid. [place] and has gotten more swollen X 5 days   .maurat who presents with a chief complaint of  right elbow swelling.  Symptoms began day(s) ago , are moderate andstill present.  Context:Injury: no  Pain exacerbated by palpation   Relieved bynothing He treated it initially with no therapy.   This is the first time this type of injury has occurred to this patient.     Past Medical History:   Diagnosis Date     Atrial tachycardia (H)     nonsustained     Gout, unspecified      PAC (premature atrial contraction)     symptomatic     Paroxysmal atrial fibrillation (H)     Dxd with lone afib in [place], [place], not anticoagulated , given Cardizem, and told to rtc if palpitations recur       Past Surgical History:   Procedure Laterality Date     APPENDECTOMY            EP STUDY, MODIFIED  2011    Attempted ablation 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       Family History   Problem Relation Age of Onset     Cancer Father          of cancer at age 49 in  with metrs to bones, pt can not recall primar tumor     Myocardial Infarction Mother        Social History     Tobacco Use     Smoking status: Never Smoker     Smokeless tobacco: Never Used   Substance Use Topics     Alcohol use: Yes     Alcohol/week: 0.0 oz     Comment: 2 beers a week       ROS:CONSTITUTIONAL:NEGATIVE for fever, chills, change in weight  INTEGUMENTARY/SKIN: NEGATIVE for worrisome rashes, moles or lesions    EXAM: /90 (BP Location: Left arm, Patient Position: Sitting, Cuff Size: Adult Regular)   Pulse 77   Resp 16   Wt 115.2 kg (254 lb)   SpO2 95%   BMI 36.45 kg/m    Exam:elbow  swelling  GENERAL APPEARANCE: healthy, alert and no distress  EXTREMITIES: peripheral pulses normal  SKIN: no suspicious lesions or rashes  NEURO: Normal strength and tone, sensory exam  grossly normal, mentation intact and speech normal      ASSESSMENT:   (M70.21) Olecranon bursitis, right elbow  (primary encounter diagnosis)  Plan: cephALEXin (KEFLEX) 500 MG capsule, naproxen         (NAPROSYN) 500 MG tablet        Ace  ice

## 2019-12-27 ENCOUNTER — TRANSFERRED RECORDS (OUTPATIENT)
Dept: HEALTH INFORMATION MANAGEMENT | Facility: CLINIC | Age: 64
End: 2019-12-27

## 2020-01-20 ENCOUNTER — OFFICE VISIT (OUTPATIENT)
Dept: URGENT CARE | Facility: URGENT CARE | Age: 65
End: 2020-01-20
Payer: COMMERCIAL

## 2020-01-20 VITALS
RESPIRATION RATE: 16 BRPM | TEMPERATURE: 97.6 F | HEART RATE: 68 BPM | SYSTOLIC BLOOD PRESSURE: 136 MMHG | BODY MASS INDEX: 35.93 KG/M2 | OXYGEN SATURATION: 98 % | HEIGHT: 70 IN | DIASTOLIC BLOOD PRESSURE: 84 MMHG | WEIGHT: 251 LBS

## 2020-01-20 DIAGNOSIS — E66.01 MORBID OBESITY (H): ICD-10-CM

## 2020-01-20 DIAGNOSIS — R05.9 COUGH: ICD-10-CM

## 2020-01-20 DIAGNOSIS — M10.071 ACUTE IDIOPATHIC GOUT OF RIGHT FOOT: Primary | ICD-10-CM

## 2020-01-20 DIAGNOSIS — R09.89 CHEST CONGESTION: ICD-10-CM

## 2020-01-20 PROBLEM — I48.91 ATRIAL FIBRILLATION (H): Status: ACTIVE | Noted: 2020-01-20

## 2020-01-20 PROCEDURE — 99214 OFFICE O/P EST MOD 30 MIN: CPT | Performed by: PHYSICIAN ASSISTANT

## 2020-01-20 RX ORDER — BENZONATATE 200 MG/1
200 CAPSULE ORAL 3 TIMES DAILY PRN
Qty: 21 CAPSULE | Refills: 0 | Status: SHIPPED | OUTPATIENT
Start: 2020-01-20 | End: 2020-02-13

## 2020-01-20 RX ORDER — AZITHROMYCIN 250 MG/1
TABLET, FILM COATED ORAL
Qty: 6 TABLET | Refills: 0 | Status: SHIPPED | OUTPATIENT
Start: 2020-01-20 | End: 2020-02-13

## 2020-01-20 RX ORDER — INDOMETHACIN 50 MG/1
50 CAPSULE ORAL 3 TIMES DAILY PRN
Qty: 30 CAPSULE | Refills: 0 | Status: SHIPPED | OUTPATIENT
Start: 2020-01-20 | End: 2020-02-13

## 2020-01-20 ASSESSMENT — MIFFLIN-ST. JEOR: SCORE: 1939.78

## 2020-01-21 NOTE — PROGRESS NOTES
"SUBJECTIVE:   Martin Flaherty is a 64 year old male presenting with a chief complaint of coughing, chest congestion and right foot pain, tenderness.  Onset of symptoms was 1 week(s) ago.  Course of illness is worsening.    Severity moderate  Current and Associated symptoms: productive cough, chest congestion, coughing  Treatment measures tried include OTC medications.  Predisposing factors include hx of gouth.    Past Medical History:   Diagnosis Date     Atrial tachycardia (H)     nonsustained     Gout, unspecified      PAC (premature atrial contraction)     symptomatic     Paroxysmal atrial fibrillation (H)     Dxd with lone afib in [place], [place], not anticoagulated , given Cardizem, and told to rtc if palpitations recur        Allergies   Allergen Reactions     No Known Drug Allergies      Family History   Problem Relation Age of Onset     Cancer Father          of cancer at age 49 in  with metrs to bones, pt can not recall primar tumor     Myocardial Infarction Mother        Social History     Tobacco Use     Smoking status: Never Smoker     Smokeless tobacco: Never Used   Substance Use Topics     Alcohol use: Yes     Alcohol/week: 0.0 standard drinks     Comment: 2 beers a week       ROS:  CONSTITUTIONAL:NEGATIVE for fever, chills, change in weight  INTEGUMENTARY/SKIN: NEGATIVE for worrisome rashes, moles or lesions  EYES: NEGATIVE for vision changes or irritation  ENT/MOUTH: POSITIVE for nasal congestion  RESP:POSITIVE for coughing, chest congestion  CV: NEGATIVE for chest pain, palpitations or peripheral edema  GI: NEGATIVE for nausea, abdominal pain, heartburn, or change in bowel habits  : negative for and dysuria  MUSCULOSKELETAL: POSITIVE  for right foot pain  NEURO: NEGATIVE for weakness, dizziness or paresthesias    OBJECTIVE  :/84   Pulse 68   Temp 97.6  F (36.4  C)   Resp 16   Ht 1.778 m (5' 10\")   Wt 113.9 kg (251 lb)   SpO2 98%   BMI 36.01 kg/m    GENERAL APPEARANCE: " healthy, alert and no distress  EYES: EOMI,  PERRL, conjunctiva clear  HENT: ear canals and TM's normal.  Nose and mouth without ulcers, erythema or lesions  NECK: supple, nontender, no lymphadenopathy  RESP: lungs clear to auscultation - no rales, rhonchi or wheezes  CV: regular rates and rhythm, normal S1 S2, no murmur noted  ABDOMEN:  soft, nontender, no HSM or masses and bowel sounds normal  MS: tender to palpation right lateral foot  NEURO: Normal strength and tone, sensory exam grossly normal,  normal speech and mentation  SKIN: no suspicious lesions or rashes    ASSESSMENT/PLAN      ICD-10-CM    1. Acute idiopathic gout of right foot M10.071 indomethacin (INDOCIN) 50 MG capsule   2. Morbid obesity (H) E66.01    3. Chest congestion R09.89 azithromycin (ZITHROMAX) 250 MG tablet   4. Cough R05 benzonatate (TESSALON) 200 MG capsule         Orders Placed This Encounter     indomethacin (INDOCIN) 50 MG capsule     azithromycin (ZITHROMAX) 250 MG tablet     benzonatate (TESSALON) 200 MG capsule       zpak for chest congestion coughing  Indocin for gout  Tessalon for coughing  Fluids, rest  Follow up with PCP as needed  See orders in Epic

## 2020-02-13 ENCOUNTER — OFFICE VISIT (OUTPATIENT)
Dept: URGENT CARE | Facility: URGENT CARE | Age: 65
End: 2020-02-13
Payer: COMMERCIAL

## 2020-02-13 VITALS
TEMPERATURE: 97.7 F | OXYGEN SATURATION: 97 % | HEART RATE: 50 BPM | SYSTOLIC BLOOD PRESSURE: 148 MMHG | DIASTOLIC BLOOD PRESSURE: 80 MMHG

## 2020-02-13 DIAGNOSIS — M10.071 ACUTE IDIOPATHIC GOUT OF RIGHT FOOT: ICD-10-CM

## 2020-02-13 PROCEDURE — 99213 OFFICE O/P EST LOW 20 MIN: CPT | Performed by: FAMILY MEDICINE

## 2020-02-13 RX ORDER — PREDNISONE 10 MG/1
30 TABLET ORAL DAILY
Qty: 15 EACH | Refills: 0 | Status: SHIPPED | OUTPATIENT
Start: 2020-02-13 | End: 2020-12-29

## 2020-02-13 RX ORDER — INDOMETHACIN 50 MG/1
50 CAPSULE ORAL 3 TIMES DAILY PRN
Qty: 30 CAPSULE | Refills: 0 | Status: SHIPPED | OUTPATIENT
Start: 2020-02-13 | End: 2020-12-29

## 2020-02-13 NOTE — PROGRESS NOTES
SUBJECTIVE  Martin Flaherty is a 64 year old male who presents today with gout concerns of right big toe. Has been having on and off gout flare ups the past year. Has been taking indomethicin for flare ups but feels like his flare ups have been recently happening more frequently. Does not take a maintenance medication. Flare up started two days ago. No fevers.    Continues to drink alcohol and eat red meat. Does not currently have a PCP.    Past Medical History:   Diagnosis Date     Atrial tachycardia (H)     nonsustained     Gout, unspecified      PAC (premature atrial contraction)     symptomatic     Paroxysmal atrial fibrillation (H) 2002    Dxd with lone afib in [place], [place], not anticoagulated , given Cardizem, and told to rtc if palpitations recur     Current Outpatient Medications   Medication Sig Dispense Refill     cholecalciferol (VITAMIN D3) 36806 units capsule Take 1 capsule (50,000 Units) by mouth once a week 13 capsule 3     Choline Fenofibrate (FENOFIBRIC ACID) 135 MG CPDR Take 1 capsule by mouth daily 90 capsule 3     flecainide (TAMBOCOR) 50 MG tablet Take 1.5 tablets (75 mg) by mouth 2 times daily 270 tablet 3     indomethacin (INDOCIN) 50 MG capsule Take 1 capsule (50 mg) by mouth 3 times daily as needed for moderate pain (gout) 30 capsule 0     omega-3 acid ethyl esters (LOVAZA) 1 g capsule Take 4 capsules by mouth daily with food 360 capsule 3     omeprazole (PRILOSEC) 20 MG DR capsule Take 1 capsule (20 mg) by mouth daily 90 capsule 3     simvastatin (ZOCOR) 40 MG tablet Take 1 tablet (40 mg) by mouth At Bedtime 90 tablet 3     Social History     Tobacco Use     Smoking status: Never Smoker     Smokeless tobacco: Never Used   Substance Use Topics     Alcohol use: Yes     Alcohol/week: 0.0 standard drinks     Comment: 2 beers a week       ROS:   ROS: 10 point ROS neg other than the symptoms noted above in the HPI.    OBJECTIVE:  BP (!) 148/80   Pulse 50   Temp 97.7  F (36.5  C) (Tympanic)    SpO2 97%   EXAM: Patient appears alert, no acute distress and alert,no apparent distress.  Feet: Swollen right proximal phalanx of the MTP joint. Mild redness.    ASSESSMENT AND PLAN: Chronic gout flare ups. Counseled diet and weight management. Would benefit from establishing with PCP. Will continue on indomethicin and added on prednisone 30mg for 5 days.

## 2020-04-13 DIAGNOSIS — E78.5 HYPERLIPIDEMIA LDL GOAL <130: ICD-10-CM

## 2020-04-13 RX ORDER — SIMVASTATIN 40 MG
40 TABLET ORAL AT BEDTIME
Qty: 90 TABLET | Refills: 1 | Status: SHIPPED | OUTPATIENT
Start: 2020-04-13 | End: 2020-12-18

## 2020-04-13 RX ORDER — FENOFIBRIC ACID 135 MG/1
1 CAPSULE, DELAYED RELEASE ORAL DAILY
Qty: 90 CAPSULE | Refills: 1 | Status: SHIPPED | OUTPATIENT
Start: 2020-04-13 | End: 2020-12-18

## 2020-04-13 NOTE — TELEPHONE ENCOUNTER
Patient requesting refills.  Meds and pharm loaded.    Clare GRACEN, RN    [facility]  Office: [phone]  Fax: [phone]

## 2020-10-15 ENCOUNTER — ALLIED HEALTH/NURSE VISIT (OUTPATIENT)
Dept: NURSING | Facility: CLINIC | Age: 65
End: 2020-10-15
Payer: COMMERCIAL

## 2020-10-15 DIAGNOSIS — Z23 NEED FOR PROPHYLACTIC VACCINATION AND INOCULATION AGAINST INFLUENZA: Primary | ICD-10-CM

## 2020-10-15 PROCEDURE — 90682 RIV4 VACC RECOMBINANT DNA IM: CPT

## 2020-10-15 PROCEDURE — 99207 PR NO CHARGE NURSE ONLY: CPT

## 2020-10-15 PROCEDURE — 90471 IMMUNIZATION ADMIN: CPT

## 2020-12-18 ENCOUNTER — OFFICE VISIT (OUTPATIENT)
Dept: CARDIOLOGY | Facility: CLINIC | Age: 65
End: 2020-12-18
Payer: COMMERCIAL

## 2020-12-18 VITALS
SYSTOLIC BLOOD PRESSURE: 142 MMHG | DIASTOLIC BLOOD PRESSURE: 92 MMHG | BODY MASS INDEX: 36.88 KG/M2 | WEIGHT: 257 LBS | HEART RATE: 70 BPM | OXYGEN SATURATION: 96 %

## 2020-12-18 DIAGNOSIS — Z51.81 ENCOUNTER FOR MONITORING FLECAINIDE THERAPY: ICD-10-CM

## 2020-12-18 DIAGNOSIS — E66.9 OBESITY (BMI 30-39.9): ICD-10-CM

## 2020-12-18 DIAGNOSIS — Z79.899 ENCOUNTER FOR MONITORING FLECAINIDE THERAPY: ICD-10-CM

## 2020-12-18 DIAGNOSIS — E78.5 HYPERLIPIDEMIA LDL GOAL <70: ICD-10-CM

## 2020-12-18 DIAGNOSIS — E78.5 HYPERLIPIDEMIA LDL GOAL <130: ICD-10-CM

## 2020-12-18 DIAGNOSIS — I47.10 SVT (SUPRAVENTRICULAR TACHYCARDIA) (H): ICD-10-CM

## 2020-12-18 DIAGNOSIS — I48.0 PAROXYSMAL ATRIAL FIBRILLATION (H): Primary | ICD-10-CM

## 2020-12-18 DIAGNOSIS — I47.10 PAROXYSMAL SUPRAVENTRICULAR TACHYCARDIA (H): ICD-10-CM

## 2020-12-18 PROCEDURE — 99214 OFFICE O/P EST MOD 30 MIN: CPT | Mod: 25 | Performed by: INTERNAL MEDICINE

## 2020-12-18 PROCEDURE — 93000 ELECTROCARDIOGRAM COMPLETE: CPT | Performed by: INTERNAL MEDICINE

## 2020-12-18 RX ORDER — SIMVASTATIN 40 MG
40 TABLET ORAL AT BEDTIME
Qty: 90 TABLET | Refills: 1 | Status: SHIPPED | OUTPATIENT
Start: 2020-12-18 | End: 2022-02-07

## 2020-12-18 RX ORDER — SIMVASTATIN 40 MG
40 TABLET ORAL AT BEDTIME
Qty: 90 TABLET | Refills: 1 | Status: SHIPPED | OUTPATIENT
Start: 2020-12-18 | End: 2020-12-18

## 2020-12-18 RX ORDER — FLECAINIDE ACETATE 50 MG/1
75 TABLET ORAL 2 TIMES DAILY
Qty: 270 TABLET | Refills: 3 | Status: SHIPPED | OUTPATIENT
Start: 2020-12-18 | End: 2021-12-30

## 2020-12-18 RX ORDER — FENOFIBRIC ACID 135 MG/1
1 CAPSULE, DELAYED RELEASE ORAL DAILY
Qty: 90 CAPSULE | Refills: 1 | Status: SHIPPED | OUTPATIENT
Start: 2020-12-18 | End: 2021-11-05

## 2020-12-18 RX ORDER — FENOFIBRIC ACID 135 MG/1
1 CAPSULE, DELAYED RELEASE ORAL DAILY
Qty: 90 CAPSULE | Refills: 1 | Status: SHIPPED | OUTPATIENT
Start: 2020-12-18 | End: 2020-12-18

## 2020-12-18 RX ORDER — FLECAINIDE ACETATE 50 MG/1
75 TABLET ORAL 2 TIMES DAILY
Qty: 270 TABLET | Refills: 3 | Status: SHIPPED | OUTPATIENT
Start: 2020-12-18 | End: 2020-12-18

## 2020-12-18 NOTE — PROGRESS NOTES
CARDIOLOGY CLINIC VISIT  DATE OF SERVICE:  December 18, 2020    PRIMARY CARE PHYSICIAN:  No primary care provider on file.    HISTORY OF PRESENT ILLNESS:  Mr. Flaherty is a 64 y/o gentleman with for followup  PACs and atrial tachycardia.  He is a 63-year-old white male who had [place]ys[place].  He presented to our clinic initially with symptomatic SVT and PACs.  The patient underwent EP study that was found to have atrial tachycardia from the AV node region.  He has been treated medically over the last 2 years with flecainide.  There has been no evidence of recurrent SVT, atrial fibrillation on flecainide therapy.  Symptomatically, he had some fatigue on metoprolol which has been discontinued.  He tolerates current dose of flecainide 75 mg b.i.d. well without side effects.  He had been on metoprolol but had significant fatigue and his was subsequently discontinued.     In follow up today, Mr. Flaherty reports feeling well overall.  He denies any exertional chest pain, chest discomfort.  He notes some shortness of breath with activities which he attributes to his weight.  His BP is elevated today, he does not normally check it at home.      PAST MEDICAL HISTORY:  Past Medical History:   Diagnosis Date     Atrial tachycardia (H)     nonsustained     Gout, unspecified      PAC (premature atrial contraction)     symptomatic     Paroxysmal atrial fibrillation (H) 2002    Dxd with lone afib in [place], [place], not anticoagulated , given Cardizem, and told to rtc if palpitations recur       MEDICATIONS:  Current Outpatient Medications   Medication     cholecalciferol (VITAMIN D3) 96959 units capsule     Choline Fenofibrate (FENOFIBRIC ACID) 135 MG CPDR     flecainide (TAMBOCOR) 50 MG tablet     indomethacin (INDOCIN) 50 MG capsule     omega-3 acid ethyl esters (LOVAZA) 1 g capsule     omeprazole (PRILOSEC) 20 MG DR capsule     predniSONE (DELTASONE) 10 MG (21) tablet therapy  pack     simvastatin (ZOCOR) 40 MG tablet     No current facility-administered medications for this visit.        ALLERGIES:  Allergies   Allergen Reactions     No Known Drug Allergies        SOCIAL HISTORY:  I have reviewed this patient's social history and updated it with pertinent information if needed. Martin Flaherty  reports that he has never smoked. He has never used smokeless tobacco. He reports current alcohol use. He reports that he does not use drugs.    FAMILY HISTORY:  I have reviewed this patient's family history and updated it with pertinent information if needed.   Family History   Problem Relation Age of Onset     Cancer Father          of cancer at age 49 in  with metrs to bones, pt can not recall primar tumor     Myocardial Infarction Mother        REVIEW OF SYSTEMS:  A complete ROS was obtained and the pertinent positives are outlined in the history of present illness above.  The remainder of systems is negative.      PHYSICAL EXAM:                     Vital Signs with Ranges     0 lbs 0 oz    Constitutional: awake, alert, no distress  Eyes: PERRL, sclera nonicteric  ENT: trachea midline  Respiratory: CTAB  Cardiovascular: RRR no m/r/g, no JVD  GI: nondistended, nontender, bowel sounds present  Lymph/Hematologic: no lymphadenopathy  Skin: dry, no rash  Musculoskeletal: good muscle tone, strength 5/5 in upper and lower extremities  Neurologic: no focal deficits  Neuropsychiatric: appropriate affact    DATA:  Labs:   Reviewed in EPIC    EKG:  Dated 2020 reviewed personally.  Normal sinus rhythm without diagnostic ST segment changes      ASSESSMENT:  1.  PACs/atrial tachycardia:  Maintained on flecainide.   2.  Remote episode of atrial fibrillation:  No symptomatic recurrence. Not on anticoagulation  3.  Elevated BP: No previous diagnosis of hypertension  4.  Hyperlipidemia:  On simvastatin    RECOMMENDATIONS:  1. Due for fasting lipid panel.  Establish with primary MD  2.  Check BP  at home; he will contact us with readings  3.  Given flecainide use and risk factors for CAD, will further risk stratify with exercise stress echocardiogram  4.  Plan for follow up in 12 months or before than as necessary.      Venessa Soni MD  Cardiology - [facility]  December 18, 2020

## 2020-12-18 NOTE — PATIENT INSTRUCTIONS
-Fasting lipid panel, HgbA1C  -Purchase blood pressure cuff to check your BP at home.  Call if BP's are greater than 140/90  -Schedule exercise stress echocardiogram  -Establish with primary MD  -Follow up with Dr. Soni in 12 months

## 2020-12-23 ENCOUNTER — CARE COORDINATION (OUTPATIENT)
Dept: CARDIOLOGY | Facility: CLINIC | Age: 65
End: 2020-12-23

## 2020-12-23 DIAGNOSIS — I47.10 PAROXYSMAL SUPRAVENTRICULAR TACHYCARDIA (H): Primary | ICD-10-CM

## 2020-12-23 NOTE — PROGRESS NOTES
"Call from patient regarding elevated blood pressure readings. Patient states that he's had several days of readings 140/100's. He states that he just bought a blood pressure cuff and has been checking when he feels slightly \"off\". He said that he was doing something that caused him to feel light headed and that prompted him to check his blood pressure. His initial reading was 149/103, he sat down and rested, rechecked his blood pressure, and it was 140/103. He said that he feels a slight sensation of dizziness/lightheadedness with the elevated readings. Will route to Dr. Soni for review.     Last OV w/ Dr. Soni 12/18/20:  -Fasting lipid panel, HgbA1C  -Purchase blood pressure cuff to check your BP at home.  Call if BP's are greater than 140/90  -Schedule exercise stress echocardiogram  -Establish with primary MD  -Follow up with Dr. Soni in 12 months     JT Hi December 23, 2020 1:30 PM     "

## 2020-12-28 PROBLEM — E66.01 MORBID OBESITY (H): Status: RESOLVED | Noted: 2020-01-20 | Resolved: 2020-12-28

## 2020-12-28 PROBLEM — I48.91 ATRIAL FIBRILLATION (H): Status: RESOLVED | Noted: 2020-01-20 | Resolved: 2020-12-28

## 2020-12-28 RX ORDER — AMLODIPINE BESYLATE 5 MG/1
5 TABLET ORAL DAILY
Qty: 90 TABLET | Refills: 3 | Status: SHIPPED | OUTPATIENT
Start: 2020-12-28 | End: 2021-01-22

## 2020-12-28 NOTE — PROGRESS NOTES
Start norvasc 5 mg daily for BP control.  Please schedule visit with DEBBY in 2-4 weeks for BP check and titration of medication as needed.       Venessa Soni MD    Called patient to discuss BP recommendations and starting Norvasc. Patient confirmed understanding. Order placed for DEBBY follow up. Medication sent to pharmacy. Patient will call if he experiences any side effects from this medication.      JT Hi December 28, 2020 10:38 AM

## 2020-12-29 ENCOUNTER — OFFICE VISIT (OUTPATIENT)
Dept: INTERNAL MEDICINE | Facility: CLINIC | Age: 65
End: 2020-12-29
Payer: COMMERCIAL

## 2020-12-29 VITALS
OXYGEN SATURATION: 95 % | WEIGHT: 252 LBS | HEART RATE: 78 BPM | DIASTOLIC BLOOD PRESSURE: 92 MMHG | RESPIRATION RATE: 16 BRPM | SYSTOLIC BLOOD PRESSURE: 142 MMHG | TEMPERATURE: 97.2 F | BODY MASS INDEX: 36.16 KG/M2

## 2020-12-29 DIAGNOSIS — Z11.4 SCREENING FOR HUMAN IMMUNODEFICIENCY VIRUS WITHOUT PRESENCE OF RISK FACTORS: ICD-10-CM

## 2020-12-29 DIAGNOSIS — Z23 NEED FOR VACCINATION: ICD-10-CM

## 2020-12-29 DIAGNOSIS — Z13.1 SCREENING FOR DIABETES MELLITUS: ICD-10-CM

## 2020-12-29 DIAGNOSIS — Z11.59 ENCOUNTER FOR HEPATITIS C SCREENING TEST FOR LOW RISK PATIENT: ICD-10-CM

## 2020-12-29 DIAGNOSIS — Z00.01 ENCOUNTER FOR ROUTINE ADULT MEDICAL EXAM WITH ABNORMAL FINDINGS: Primary | ICD-10-CM

## 2020-12-29 DIAGNOSIS — I10 BENIGN ESSENTIAL HYPERTENSION: ICD-10-CM

## 2020-12-29 DIAGNOSIS — Z12.5 SCREENING FOR PROSTATE CANCER: ICD-10-CM

## 2020-12-29 DIAGNOSIS — M1A.0720 IDIOPATHIC CHRONIC GOUT OF LEFT FOOT WITHOUT TOPHUS: ICD-10-CM

## 2020-12-29 DIAGNOSIS — E78.00 PURE HYPERCHOLESTEROLEMIA: ICD-10-CM

## 2020-12-29 DIAGNOSIS — E78.1 HYPERTRIGLYCERIDEMIA: ICD-10-CM

## 2020-12-29 PROCEDURE — 99213 OFFICE O/P EST LOW 20 MIN: CPT | Mod: 25 | Performed by: INTERNAL MEDICINE

## 2020-12-29 PROCEDURE — 99396 PREV VISIT EST AGE 40-64: CPT | Mod: 25 | Performed by: INTERNAL MEDICINE

## 2020-12-29 PROCEDURE — 90471 IMMUNIZATION ADMIN: CPT | Performed by: INTERNAL MEDICINE

## 2020-12-29 PROCEDURE — 90715 TDAP VACCINE 7 YRS/> IM: CPT | Performed by: INTERNAL MEDICINE

## 2020-12-29 RX ORDER — PROBENECID AND COLCHICINE .5; 5 MG/1; MG/1
1 TABLET ORAL 2 TIMES DAILY
Qty: 180 TABLET | Refills: 0 | Status: SHIPPED | OUTPATIENT
Start: 2020-12-29 | End: 2021-03-31

## 2020-12-29 RX ORDER — INDOMETHACIN 50 MG/1
50 CAPSULE ORAL 3 TIMES DAILY PRN
Qty: 30 CAPSULE | Refills: 0 | Status: SHIPPED | OUTPATIENT
Start: 2020-12-29 | End: 2021-12-30

## 2020-12-29 RX ORDER — ALLOPURINOL 300 MG/1
300 TABLET ORAL DAILY
Qty: 90 TABLET | Refills: 3 | Status: SHIPPED | OUTPATIENT
Start: 2020-12-29 | End: 2022-01-31

## 2020-12-29 SDOH — ECONOMIC STABILITY: TRANSPORTATION INSECURITY
IN THE PAST 12 MONTHS, HAS LACK OF TRANSPORTATION KEPT YOU FROM MEETINGS, WORK, OR FROM GETTING THINGS NEEDED FOR DAILY LIVING?: NOT ASKED

## 2020-12-29 SDOH — HEALTH STABILITY: MENTAL HEALTH: HOW OFTEN DO YOU HAVE 6 OR MORE DRINKS ON ONE OCCASION?: NEVER

## 2020-12-29 SDOH — ECONOMIC STABILITY: FOOD INSECURITY: WITHIN THE PAST 12 MONTHS, YOU WORRIED THAT YOUR FOOD WOULD RUN OUT BEFORE YOU GOT MONEY TO BUY MORE.: NOT ASKED

## 2020-12-29 SDOH — HEALTH STABILITY: MENTAL HEALTH: HOW MANY STANDARD DRINKS CONTAINING ALCOHOL DO YOU HAVE ON A TYPICAL DAY?: 1 OR 2

## 2020-12-29 SDOH — ECONOMIC STABILITY: INCOME INSECURITY: HOW HARD IS IT FOR YOU TO PAY FOR THE VERY BASICS LIKE FOOD, HOUSING, MEDICAL CARE, AND HEATING?: NOT ASKED

## 2020-12-29 SDOH — HEALTH STABILITY: MENTAL HEALTH: HOW OFTEN DO YOU HAVE A DRINK CONTAINING ALCOHOL?: 2-3 TIMES A WEEK

## 2020-12-29 SDOH — ECONOMIC STABILITY: FOOD INSECURITY: WITHIN THE PAST 12 MONTHS, THE FOOD YOU BOUGHT JUST DIDN'T LAST AND YOU DIDN'T HAVE MONEY TO GET MORE.: NOT ASKED

## 2020-12-29 SDOH — ECONOMIC STABILITY: TRANSPORTATION INSECURITY
IN THE PAST 12 MONTHS, HAS THE LACK OF TRANSPORTATION KEPT YOU FROM MEDICAL APPOINTMENTS OR FROM GETTING MEDICATIONS?: NOT ASKED

## 2020-12-29 NOTE — PROGRESS NOTES
SUBJECTIVE                                                      HPI: Martin Flaherty is a very pleasant 65 year old male who presents for a physical.    Patient's blood pressure is noted to be elevated today. Patient was recently prescribed amlodipine 5mg daily for elevated blood pressure, but only picked up the medication today and has not taken it yet. He is asymptomatic from a blood pressure perspective: no chest pain or palpitations, no shortness of breath, no light-headedness or dizziness, no headaches or vision changes.    Patient also reports frequent gouty flares involving his left foot. Flares occur ~once a month and respond well to indomethacin. Has never been on allopurinol.    ROS:  Constitutional: no unintentional weight loss or gain reported; no fevers, chills, or sweats  reported    Cardiovascular: no chest pain, palpitations, or edema reported  Respiratory: no cough, wheezing, shortness of breath, or dyspnea on exertion reported  Gastrointestinal: no nausea, vomiting, constipation, diarrhea, or abdominal pain reported  Genitourinary: no urinary frequency, urgency, dysuria, or hematuria reported  Integumentary: no rash or pruritus reported  Musculoskeletal: no back pain, muscle pain, joint pain, or joint swelling reported  Neurologic: no focal weakness, numbness, or tingling reported  Hematologic: no easy bruising or bleeding reported  Endocrine: no heat or cold intolerance reported; no polyuria or polydipsia reported  Psychiatric: no anxiety or depression reported    Past Medical History:   Diagnosis Date     Atrial tachycardia (H)      Benign essential hypertension      Chronic gout      History of atrial fibrillation     remote; not on AC     Hypertriglyceridemia      Obesity (BMI 30-39.9)      Pure hypercholesterolemia      Past Surgical History:   Procedure Laterality Date     APPENDECTOMY       EP STUDY, MODIFIED  2/2011    Attempted ablation 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      Family History   Problem Relation Age of Onset     Cancer Father 48        metastatic at presentation; unknown primary     Myocardial Infarction Mother         later in life     Hyperlipidemia Brother         multiple brothers     Diabetes No family hx of      Cerebrovascular Disease No family hx of      Coronary Artery Disease Early Onset No family hx of      Colon Cancer No family hx of      Prostate Cancer No family hx of      Social History     Occupational History     Occupation: Retired - product support     Employer: [employer]   Tobacco Use     Smoking status: Never Smoker     Smokeless tobacco: Never Used   Substance and Sexual Activity     Alcohol use: Yes     Frequency: 2-3 times a week     Drinks per session: 1 or 2     Binge frequency: Never     Drug use: No   Social History Narrative    .    3 adult children.    No grand children.    Walks dog twice a day x 1 hour each time.      No Known Allergies     Current Outpatient Medications   Medication Sig     allopurinol (ZYLOPRIM) 300 MG tablet Take 1 tablet (300 mg) by mouth daily     amLODIPine (NORVASC) 5 MG tablet Take 1 tablet (5 mg) by mouth daily     Choline Fenofibrate (FENOFIBRIC ACID) 135 MG CPDR Take 1 capsule by mouth daily     colchicine-probenecid (COLBENEMID) 0.5-500 MG tablet Take 1 tablet by mouth 2 times daily     flecainide (TAMBOCOR) 50 MG tablet Take 1.5 tablets (75 mg) by mouth 2 times daily     indomethacin (INDOCIN) 50 MG capsule Take 1 capsule (50 mg) by mouth 3 times daily as needed for moderate pain (gout)     omeprazole (PRILOSEC) 20 MG DR capsule Take 1 capsule (20 mg) by mouth daily     simvastatin (ZOCOR) 40 MG tablet Take 1 tablet (40 mg) by mouth At Bedtime     Immunization History   Administered Date(s) Administered     Influenza (IIV3) PF 11/14/1997     Influenza Quad, Recombinant, p-free (RIV4) 03/17/2020, 10/15/2020     Influenza Vaccine, 6+MO IM (QUADRIVALENT W/PRESERVATIVES) 10/21/2015, 10/19/2018     TD  (ADULT, 7+) 07/07/1998, 12/02/2010     Zoster vaccine recombinant adjuvanted (SHINGRIX) 09/02/2020, 12/18/2020     Zoster vaccine, live 11/12/2016     OBJECTIVE                                                      BP (!) 142/92 (BP Location: Left arm, Patient Position: Chair, Cuff Size: Adult Large)   Pulse 78   Temp 97.2  F (36.2  C) (Temporal)   Resp 16   Wt 114.3 kg (252 lb)   SpO2 95%   BMI 36.16 kg/m    Constitutional: well-appearing  Eyes: normal conjunctivae and lids  Head, Ears, and Eyes: normocephalic; normal external auditory canal and pinna; tympanic membranes visualized and normal; normal lids and conjunctivae  Neck: supple, symmetric, no thyromegaly or lymphadenopathy  Respiratory: normal respiratory effort; clear to auscultation bilaterally  Cardiovascular: regular rate and rhythm; no edema  Gastrointestinal: soft, non-tender, and non-distended; no organomegaly or masses  Musculoskeletal: normal gait and station  Psych: normal judgment and insight; normal mood and affect; recent and remote memory intact    PREVENTATIVE HEALTH                                                      Prostate CA Screening: DUE  Colon CA screening: up to date   Lung CA screening: n/a   AAA screening: n/a   Screening HCV: DUE  Screening HIV: DUE  Screening diabetes: DUE  STD testing: no risk factors present  Alcohol misuse screening: alcohol use reviewed - no intervention indicated at this time  Immunizations: reviewed; TDAP DUE    ASSESSMENT/PLAN                                                       (Z00.01) Encounter for routine adult medical exam with abnormal findings  (primary encounter diagnosis)  Comment: PMH, PSH, FH, SH, medications, allergies, immunizations, and preventative health measures reviewed and updated as appropriate.  Plan: see below for plans.      (Z23) Need for vaccination  Plan: TDAP given today.    (E78.00) Pure hypercholesterolemia  (E78.1) Hypertriglyceridemia  (Z13.1) Screening for diabetes  mellitus  (Z11.59) Encounter for hepatitis C screening test for low risk patient  (Z11.4) Screening for human immunodeficiency virus without presence of risk factors  (Z12.5) Screening for prostate cancer  Plan: fasting labs ordered - patient to schedule.     (M1A.0720) Idiopathic chronic gout of left foot without tophus  Comment: frequent flares.  Plan: START allopurinol 300 mg daily, long-term; START colchicine-probenecid twice a day for 3 months only; HOLD simvastatin while on colchicine (may resume after completing colchicine); refills of indomethacin provided in case of acute gouty flares.    (I10) Benign essential hypertension  Comment: poorly-controlled off of medication.  Plan: START amlodipine 5 mg daily; blood pressure follow-up in any [place] pharmacy in 2-4 weeks.    Stephania Laughlin MD   [address] [address]  T: [phone], F: [phone]    (Note was completed, in part, with [employer] voice-recognition software. Documentation was reviewed, but some grammatical, spelling, and word errors may remain.)

## 2020-12-29 NOTE — PATIENT INSTRUCTIONS
TDAP today.    Schedule fasting labs.    Blood pressure follow-up in our pharmacy in ~2-4 weeks please.    ---    START Allopurinol ONCE daily.    START Colchicine-probenacid TWICE daily x 3 months only.    HOLD Simvastatin for 3 months (while on Colchicine-probenacid).    ---

## 2020-12-31 ENCOUNTER — HOSPITAL ENCOUNTER (OUTPATIENT)
Dept: CARDIOLOGY | Facility: CLINIC | Age: 65
Discharge: HOME | End: 2020-12-31
Attending: INTERNAL MEDICINE | Admitting: INTERNAL MEDICINE
Payer: COMMERCIAL

## 2020-12-31 DIAGNOSIS — Z79.899 ENCOUNTER FOR MONITORING FLECAINIDE THERAPY: ICD-10-CM

## 2020-12-31 DIAGNOSIS — Z51.81 ENCOUNTER FOR MONITORING FLECAINIDE THERAPY: ICD-10-CM

## 2020-12-31 PROCEDURE — 93350 STRESS TTE ONLY: CPT | Mod: 26 | Performed by: INTERNAL MEDICINE

## 2020-12-31 PROCEDURE — 255N000002 HC RX 255 OP 636: Performed by: INTERNAL MEDICINE

## 2020-12-31 PROCEDURE — 93325 DOPPLER ECHO COLOR FLOW MAPG: CPT | Mod: 26 | Performed by: INTERNAL MEDICINE

## 2020-12-31 PROCEDURE — 93016 CV STRESS TEST SUPVJ ONLY: CPT | Performed by: INTERNAL MEDICINE

## 2020-12-31 PROCEDURE — 93321 DOPPLER ECHO F-UP/LMTD STD: CPT | Mod: 26 | Performed by: INTERNAL MEDICINE

## 2020-12-31 PROCEDURE — 93018 CV STRESS TEST I&R ONLY: CPT | Performed by: INTERNAL MEDICINE

## 2020-12-31 PROCEDURE — 999N000208 ECHO STRESS ECHOCARDIOGRAM

## 2020-12-31 RX ADMIN — HUMAN ALBUMIN MICROSPHERES AND PERFLUTREN 9 ML: 10; .22 INJECTION, SOLUTION INTRAVENOUS at 16:00

## 2021-01-04 ENCOUNTER — TELEPHONE (OUTPATIENT)
Dept: CARDIOLOGY | Facility: CLINIC | Age: 66
End: 2021-01-04

## 2021-01-04 NOTE — TELEPHONE ENCOUNTER
RN called patient and updated him with his stress test results and Dr. Soni's recommendations. Patient verbalized understanding and is in agreement with plan. Patient has no further questions at this time.       ----- Message from Venessa Soni MD sent at 1/3/2021  6:04 PM CST -----  Please let Mr Flaherty know that his stress test was normal-no evidence for blockages of the coronary arteries.    Thanks,  Venessa Soni MD

## 2021-01-07 DIAGNOSIS — E78.00 PURE HYPERCHOLESTEROLEMIA: ICD-10-CM

## 2021-01-07 DIAGNOSIS — Z11.4 SCREENING FOR HUMAN IMMUNODEFICIENCY VIRUS WITHOUT PRESENCE OF RISK FACTORS: ICD-10-CM

## 2021-01-07 DIAGNOSIS — Z12.5 SCREENING FOR PROSTATE CANCER: ICD-10-CM

## 2021-01-07 DIAGNOSIS — E78.5 HYPERLIPIDEMIA LDL GOAL <70: ICD-10-CM

## 2021-01-07 DIAGNOSIS — E78.1 HYPERTRIGLYCERIDEMIA: ICD-10-CM

## 2021-01-07 DIAGNOSIS — Z11.59 ENCOUNTER FOR HEPATITIS C SCREENING TEST FOR LOW RISK PATIENT: ICD-10-CM

## 2021-01-07 DIAGNOSIS — E66.9 OBESITY (BMI 30-39.9): ICD-10-CM

## 2021-01-07 DIAGNOSIS — Z13.1 SCREENING FOR DIABETES MELLITUS: ICD-10-CM

## 2021-01-07 LAB
ALBUMIN SERPL-MCNC: 4.3 G/DL (ref 3.4–5)
ALP SERPL-CCNC: 86 U/L (ref 40–150)
ALT SERPL W P-5'-P-CCNC: 42 U/L (ref 0–70)
ANION GAP SERPL CALCULATED.3IONS-SCNC: 3 MMOL/L (ref 3–14)
AST SERPL W P-5'-P-CCNC: 26 U/L (ref 0–45)
BILIRUB SERPL-MCNC: 0.6 MG/DL (ref 0.2–1.3)
BUN SERPL-MCNC: 16 MG/DL (ref 7–30)
CALCIUM SERPL-MCNC: 9.1 MG/DL (ref 8.5–10.1)
CHLORIDE SERPL-SCNC: 108 MMOL/L (ref 94–109)
CHOLEST SERPL-MCNC: 158 MG/DL
CO2 SERPL-SCNC: 29 MMOL/L (ref 20–32)
CREAT SERPL-MCNC: 1.17 MG/DL (ref 0.66–1.25)
GFR SERPL CREATININE-BSD FRML MDRD: 65 ML/MIN/{1.73_M2}
GLUCOSE SERPL-MCNC: 119 MG/DL (ref 70–99)
HBA1C MFR BLD: 5.8 % (ref 0–5.6)
HCV AB SERPL QL IA: NONREACTIVE
HDLC SERPL-MCNC: 27 MG/DL
HIV 1+2 AB+HIV1 P24 AG SERPL QL IA: NONREACTIVE
LDLC SERPL CALC-MCNC: 98 MG/DL
NONHDLC SERPL-MCNC: 131 MG/DL
POTASSIUM SERPL-SCNC: 4.2 MMOL/L (ref 3.4–5.3)
PROT SERPL-MCNC: 7.6 G/DL (ref 6.8–8.8)
PSA SERPL-ACNC: 1.2 UG/L (ref 0–4)
SODIUM SERPL-SCNC: 140 MMOL/L (ref 133–144)
TRIGL SERPL-MCNC: 165 MG/DL

## 2021-01-07 PROCEDURE — 86803 HEPATITIS C AB TEST: CPT | Performed by: INTERNAL MEDICINE

## 2021-01-07 PROCEDURE — 87389 HIV-1 AG W/HIV-1&-2 AB AG IA: CPT | Performed by: INTERNAL MEDICINE

## 2021-01-07 PROCEDURE — 36415 COLL VENOUS BLD VENIPUNCTURE: CPT | Performed by: INTERNAL MEDICINE

## 2021-01-07 PROCEDURE — 83036 HEMOGLOBIN GLYCOSYLATED A1C: CPT | Performed by: INTERNAL MEDICINE

## 2021-01-07 PROCEDURE — G0103 PSA SCREENING: HCPCS | Performed by: INTERNAL MEDICINE

## 2021-01-07 PROCEDURE — 80053 COMPREHEN METABOLIC PANEL: CPT | Performed by: INTERNAL MEDICINE

## 2021-01-07 PROCEDURE — 80061 LIPID PANEL: CPT | Performed by: INTERNAL MEDICINE

## 2021-01-18 ENCOUNTER — TELEPHONE (OUTPATIENT)
Dept: CARDIOLOGY | Facility: CLINIC | Age: 66
End: 2021-01-18

## 2021-01-18 NOTE — TELEPHONE ENCOUNTER
RN called patient and updated him with the test results and plan. Patient advised RN he will follow up with PMD. Patient had no further questions/concerns at this time.      ----- Message from Venessa Soni MD sent at 1/15/2021  2:56 PM CST -----  HgbA1C 5.8; this is consistent with prediabetes (range 5.7-6.4).  He should discuss this with his primary care physician.    Venessa Soni MD

## 2021-01-22 ENCOUNTER — OFFICE VISIT (OUTPATIENT)
Dept: CARDIOLOGY | Facility: CLINIC | Age: 66
End: 2021-01-22
Payer: COMMERCIAL

## 2021-01-22 VITALS
OXYGEN SATURATION: 96 % | HEART RATE: 72 BPM | HEIGHT: 70 IN | DIASTOLIC BLOOD PRESSURE: 90 MMHG | BODY MASS INDEX: 36.19 KG/M2 | SYSTOLIC BLOOD PRESSURE: 135 MMHG | WEIGHT: 252.8 LBS

## 2021-01-22 DIAGNOSIS — I47.10 PAROXYSMAL SUPRAVENTRICULAR TACHYCARDIA (H): ICD-10-CM

## 2021-01-22 DIAGNOSIS — I10 BENIGN ESSENTIAL HYPERTENSION: Primary | ICD-10-CM

## 2021-01-22 PROCEDURE — 99214 OFFICE O/P EST MOD 30 MIN: CPT | Performed by: PHYSICIAN ASSISTANT

## 2021-01-22 RX ORDER — AMLODIPINE BESYLATE 5 MG/1
10 TABLET ORAL DAILY
Qty: 90 TABLET | Refills: 3 | COMMUNITY
Start: 2021-01-22 | End: 2021-03-03

## 2021-01-22 ASSESSMENT — MIFFLIN-ST. JEOR: SCORE: 1942.94

## 2021-01-22 NOTE — PROGRESS NOTES
"  Cardiology Clinic Progress Note    Martin Flaherty MRN# [id number]   YOB: 1955 Age: 65 year old   Primary cardiologist: Dr. Hernandez         Assessment and Plan:     In summary, Martin Flaherty presents today for follow-up on hypertension after the addition of amlodipine 5 mg daily.    Plan:  -Increase amlodipine to 10 mg daily.  -Sleep medicine referral for probable sleep apnea.    Follow-up:  -With me, virtually, in 2 weeks.        History of Presenting Illness:      Martin Flaherty is a pleasant 65 year old patient who presents today to follow-up on hypertension.    The patient has a history of the following -   1.  Paroxysmal atrial fibrillation, PACs and atrial tachycardia.  On flecainide.  Unable to tolerate metoprolol due to fatigue.  Not anticoagulated due to a [id number]-OYMb score of 1.  2.  Hypertension  3.  Benign stress echocardiogram in January 2021.  4.  Gout    In brief, Martin met Dr. Zamudio last month.  He was hypertensive and started on amlodipine 5 mg daily.  He presents today to follow-up on those changes.  Today, blood pressure remains elevated in the 130s over 90s, consistent with his blood pressures at home. Patient denies chest pain, shortness of breath, PND, orthopnea, edema, claudication, palpitations, near syncope or syncope.  He does admit to snoring very loudly, needs to sleep in a separate room from his wife for this reason.  Also has some daytime somnolence, and wakes frequently at night.         Review of Systems:     12-pt ROS is negative except for as noted in the HPI.          Physical Exam:     Vitals: BP (!) 135/90   Pulse 72   Ht 1.778 m (5' 10\")   Wt 114.7 kg (252 lb 12.8 oz)   SpO2 96%   BMI 36.27 kg/m    Wt Readings from Last 10 Encounters:   01/22/21 114.7 kg (252 lb 12.8 oz)   12/29/20 114.3 kg (252 lb)   12/18/20 116.6 kg (257 lb)   01/20/20 113.9 kg (251 lb)   06/11/19 115.2 kg (254 lb)   05/08/19 111.1 kg (245 lb)   11/07/18 117 kg (258 lb) "   10/03/18 117.1 kg (258 lb 3.2 oz)   05/01/18 116.6 kg (257 lb)   12/30/16 113.4 kg (250 lb)       Constitutional:  Patient is pleasant, alert, cooperative, and in NAD.  HEENT:  NCAT. PERRLA. EOM's intact.   Neck:  CVP appears normal. No carotid bruits.   Pulmonary: Normal respiratory effort. CTAB.   Cardiac: RRR, normal S1/S2, no S3/S4, no murmur or rub.   Abdomen:  Non-tender abdomen, no hepatosplenomegaly appreciated.   Vascular: Pulses in the upper and lower extremities are 2+ and equal bilaterally.  Extremities: No edema, erythema, cyanosis or tenderness appreciated.  Skin:  No rashes or lesions appreciated.   Neurological:  No gross motor or sensory deficits.   Psych: Appropriate affect.        Data:     Labs reviewed:  Recent Labs   Lab Test 01/07/21  0846 10/31/18  0729 11/23/16  0751 06/29/15  0744 11/26/13  1138   LDL 98 59 82 89 82   HDL 27* 27* 27* 33* 28*   NHDL 131* 98 109  --   --    CHOL 158 125 136 143 134   TRIG 165* 195* 133 106 122   TSH  --  3.52 3.00 2.58 2.44   IRON  --   --   --   --  72   FEB  --   --   --   --  434*   IRONSAT  --   --   --   --  17   CARITO  --   --   --   --  9*       Lab Results   Component Value Date    WBC 4.8 05/08/2019    RBC 5.00 05/08/2019    HGB 15.1 05/08/2019    HCT 42.4 05/08/2019    MCV 85 05/08/2019    MCH 30.2 05/08/2019    MCHC 35.6 05/08/2019    RDW 12.5 05/08/2019     (L) 05/08/2019       Lab Results   Component Value Date     01/07/2021    POTASSIUM 4.2 01/07/2021    CHLORIDE 108 01/07/2021    CO2 29 01/07/2021    ANIONGAP 3 01/07/2021     (H) 01/07/2021    BUN 16 01/07/2021    CR 1.17 01/07/2021    GFRESTIMATED 65 01/07/2021    GFRESTBLACK 76 01/07/2021    GAVIN 9.1 01/07/2021      Lab Results   Component Value Date    AST 26 01/07/2021    ALT 42 01/07/2021       Lab Results   Component Value Date    A1C 5.8 (H) 01/07/2021       No results found for: INR        Problem List:     Patient Active Problem List   Diagnosis     Atrial  tachycardia (H)     History of atrial fibrillation     Pure hypercholesterolemia     Hypertriglyceridemia     Benign essential hypertension     Morbid obesity (H)     Obesity (BMI 30-39.9)     Chronic gout           Medications:     Current Outpatient Medications   Medication Sig Dispense Refill     allopurinol (ZYLOPRIM) 300 MG tablet Take 1 tablet (300 mg) by mouth daily 90 tablet 3     amLODIPine (NORVASC) 5 MG tablet Take 1 tablet (5 mg) by mouth daily 90 tablet 3     Choline Fenofibrate (FENOFIBRIC ACID) 135 MG CPDR Take 1 capsule by mouth daily 90 capsule 1     colchicine-probenecid (COLBENEMID) 0.5-500 MG tablet Take 1 tablet by mouth 2 times daily 180 tablet 0     flecainide (TAMBOCOR) 50 MG tablet Take 1.5 tablets (75 mg) by mouth 2 times daily 270 tablet 3     indomethacin (INDOCIN) 50 MG capsule Take 1 capsule (50 mg) by mouth 3 times daily as needed for moderate pain (gout) 30 capsule 0     omeprazole (PRILOSEC) 20 MG DR capsule Take 1 capsule (20 mg) by mouth daily 90 capsule 3     simvastatin (ZOCOR) 40 MG tablet Take 1 tablet (40 mg) by mouth At Bedtime (Patient not taking: Reported on 1/22/2021) 90 tablet 1           Past Medical History:     Past Medical History:   Diagnosis Date     Atrial tachycardia (H)      Benign essential hypertension      Chronic gout      History of atrial fibrillation     remote; not on AC     Hypertriglyceridemia      Obesity (BMI 30-39.9)      Pure hypercholesterolemia      Past Surgical History:   Procedure Laterality Date     APPENDECTOMY            EP STUDY, MODIFIED  2/2011    Attempted ablation 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     Family History   Problem Relation Age of Onset     Cancer Father 48        metastatic at presentation; unknown primary     Myocardial Infarction Mother         later in life     Hyperlipidemia Brother         multiple brothers     Diabetes No family hx of      Cerebrovascular Disease No family hx of      Coronary Artery Disease  Early Onset No family hx of      Colon Cancer No family hx of      Prostate Cancer No family hx of      Social History     Socioeconomic History     Marital status:      Spouse name: Lesley     Number of children: 3     Years of education: 14     Highest education level: Not on file   Occupational History     Occupation: Retired - product support     Employer: [employer]   Social Needs     Financial resource strain: Not on file     Food insecurity     Worry: Not on file     Inability: Not on file     Transportation needs     Medical: Not on file     Non-medical: Not on file   Tobacco Use     Smoking status: Never Smoker     Smokeless tobacco: Never Used   Substance and Sexual Activity     Alcohol use: Yes     Frequency: 2-3 times a week     Drinks per session: 1 or 2     Binge frequency: Never     Comment: occ     Drug use: No     Sexual activity: Not on file   Lifestyle     Physical activity     Days per week: Not on file     Minutes per session: Not on file     Stress: Not on file   Relationships     Social connections     Talks on phone: Not on file     Gets together: Not on file     Attends Hindu service: Not on file     Active member of club or organization: Not on file     Attends meetings of clubs or organizations: Not on file     Relationship status: Not on file     Intimate partner violence     Fear of current or ex partner: Not on file     Emotionally abused: Not on file     Physically abused: Not on file     Forced sexual activity: Not on file   Other Topics Concern      Service Not Asked     Blood Transfusions Not Asked     Caffeine Concern No     Occupational Exposure Not Asked     Hobby Hazards Not Asked     Sleep Concern No     Stress Concern No     Weight Concern No     Special Diet No     Back Care Not Asked     Exercise Yes     Comment: walks daily     Bike Helmet Not Asked     Seat Belt Not Asked     Self-Exams Not Asked     Parent/sibling w/ CABG, MI or angioplasty before 65F  55M? Not Asked   Social History Narrative    .    3 adult children.    No grand children.    Walks dog twice a day x 1 hour each time.            Allergies:   Patient has no known allergies.      Eulalia Miner PA-C  [facility]  Pager: [phone]

## 2021-01-22 NOTE — LETTER
"1/22/2021    Stephania Luaghlin MD  [address] [facility] [address]    RE: Martin Flaherty       Dear Colleague,    I had the pleasure of seeing Martin Flaherty in the [facility].    Cardiology Clinic Progress Note    Martin Flaherty MRN# [id number]   YOB: 1955 Age: 65 year old   Primary cardiologist: Dr. Hernandez         Assessment and Plan:     In summary, Martin Flaherty presents today for follow-up on hypertension after the addition of amlodipine 5 mg daily.    Plan:  -Increase amlodipine to 10 mg daily.  -Sleep medicine referral for probable sleep apnea.    Follow-up:  -With me, virtually, in 2 weeks.        History of Presenting Illness:      Mratin Flaherty is a pleasant 65 year old patient who presents today to follow-up on hypertension.    The patient has a history of the following -   1.  Paroxysmal atrial fibrillation, PACs and atrial tachycardia.  On flecainide.  Unable to tolerate metoprolol due to fatigue.  Not anticoagulated due to a [id number] score of 1.  2.  Hypertension  3.  Benign stress echocardiogram in January 2021.  4.  Gout    In brief, Martin met Dr. Zamudio last month.  He was hypertensive and started on amlodipine 5 mg daily.  He presents today to follow-up on those changes.  Today, blood pressure remains elevated in the 130s over 90s, consistent with his blood pressures at home. Patient denies chest pain, shortness of breath, PND, orthopnea, edema, claudication, palpitations, near syncope or syncope.  He does admit to snoring very loudly, needs to sleep in a separate room from his wife for this reason.  Also has some daytime somnolence, and wakes frequently at night.         Review of Systems:     12-pt ROS is negative except for as noted in the HPI.          Physical Exam:     Vitals: BP (!) 135/90   Pulse 72   Ht 1.778 m (5' 10\")   Wt 114.7 kg (252 lb 12.8 oz)   SpO2 96%   BMI 36.27 kg/m    Wt Readings from Last 10 " Encounters:   01/22/21 114.7 kg (252 lb 12.8 oz)   12/29/20 114.3 kg (252 lb)   12/18/20 116.6 kg (257 lb)   01/20/20 113.9 kg (251 lb)   06/11/19 115.2 kg (254 lb)   05/08/19 111.1 kg (245 lb)   11/07/18 117 kg (258 lb)   10/03/18 117.1 kg (258 lb 3.2 oz)   05/01/18 116.6 kg (257 lb)   12/30/16 113.4 kg (250 lb)       Constitutional:  Patient is pleasant, alert, cooperative, and in NAD.  HEENT:  NCAT. PERRLA. EOM's intact.   Neck:  CVP appears normal. No carotid bruits.   Pulmonary: Normal respiratory effort. CTAB.   Cardiac: RRR, normal S1/S2, no S3/S4, no murmur or rub.   Abdomen:  Non-tender abdomen, no hepatosplenomegaly appreciated.   Vascular: Pulses in the upper and lower extremities are 2+ and equal bilaterally.  Extremities: No edema, erythema, cyanosis or tenderness appreciated.  Skin:  No rashes or lesions appreciated.   Neurological:  No gross motor or sensory deficits.   Psych: Appropriate affect.        Data:     Labs reviewed:  Recent Labs   Lab Test 01/07/21  0846 10/31/18  0729 11/23/16  0751 06/29/15  0744 11/26/13  1138   LDL 98 59 82 89 82   HDL 27* 27* 27* 33* 28*   NHDL 131* 98 109  --   --    CHOL 158 125 136 143 134   TRIG 165* 195* 133 106 122   TSH  --  3.52 3.00 2.58 2.44   IRON  --   --   --   --  72   FEB  --   --   --   --  434*   IRONSAT  --   --   --   --  17   CARITO  --   --   --   --  9*       Lab Results   Component Value Date    WBC 4.8 05/08/2019    RBC 5.00 05/08/2019    HGB 15.1 05/08/2019    HCT 42.4 05/08/2019    MCV 85 05/08/2019    MCH 30.2 05/08/2019    MCHC 35.6 05/08/2019    RDW 12.5 05/08/2019     (L) 05/08/2019       Lab Results   Component Value Date     01/07/2021    POTASSIUM 4.2 01/07/2021    CHLORIDE 108 01/07/2021    CO2 29 01/07/2021    ANIONGAP 3 01/07/2021     (H) 01/07/2021    BUN 16 01/07/2021    CR 1.17 01/07/2021    GFRESTIMATED 65 01/07/2021    GFRESTBLACK 76 01/07/2021    GAVIN 9.1 01/07/2021      Lab Results   Component Value Date     AST 26 01/07/2021    ALT 42 01/07/2021       Lab Results   Component Value Date    A1C 5.8 (H) 01/07/2021       No results found for: INR        Problem List:     Patient Active Problem List   Diagnosis     Atrial tachycardia (H)     History of atrial fibrillation     Pure hypercholesterolemia     Hypertriglyceridemia     Benign essential hypertension     Morbid obesity (H)     Obesity (BMI 30-39.9)     Chronic gout           Medications:     Current Outpatient Medications   Medication Sig Dispense Refill     allopurinol (ZYLOPRIM) 300 MG tablet Take 1 tablet (300 mg) by mouth daily 90 tablet 3     amLODIPine (NORVASC) 5 MG tablet Take 1 tablet (5 mg) by mouth daily 90 tablet 3     Choline Fenofibrate (FENOFIBRIC ACID) 135 MG CPDR Take 1 capsule by mouth daily 90 capsule 1     colchicine-probenecid (COLBENEMID) 0.5-500 MG tablet Take 1 tablet by mouth 2 times daily 180 tablet 0     flecainide (TAMBOCOR) 50 MG tablet Take 1.5 tablets (75 mg) by mouth 2 times daily 270 tablet 3     indomethacin (INDOCIN) 50 MG capsule Take 1 capsule (50 mg) by mouth 3 times daily as needed for moderate pain (gout) 30 capsule 0     omeprazole (PRILOSEC) 20 MG DR capsule Take 1 capsule (20 mg) by mouth daily 90 capsule 3     simvastatin (ZOCOR) 40 MG tablet Take 1 tablet (40 mg) by mouth At Bedtime (Patient not taking: Reported on 1/22/2021) 90 tablet 1           Past Medical History:     Past Medical History:   Diagnosis Date     Atrial tachycardia (H)      Benign essential hypertension      Chronic gout      History of atrial fibrillation     remote; not on AC     Hypertriglyceridemia      Obesity (BMI 30-39.9)      Pure hypercholesterolemia      Past Surgical History:   Procedure Laterality Date     APPENDECTOMY            EP STUDY, MODIFIED  2/2011    Attempted ablation 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     Family History   Problem Relation Age of Onset     Cancer Father 48        metastatic at presentation; unknown  primary     Myocardial Infarction Mother         later in life     Hyperlipidemia Brother         multiple brothers     Diabetes No family hx of      Cerebrovascular Disease No family hx of      Coronary Artery Disease Early Onset No family hx of      Colon Cancer No family hx of      Prostate Cancer No family hx of      Social History     Socioeconomic History     Marital status:      Spouse name: Lesley     Number of children: 3     Years of education: 14     Highest education level: Not on file   Occupational History     Occupation: Retired - product support     Employer: [employer]   Social Needs     Financial resource strain: Not on file     Food insecurity     Worry: Not on file     Inability: Not on file     Transportation needs     Medical: Not on file     Non-medical: Not on file   Tobacco Use     Smoking status: Never Smoker     Smokeless tobacco: Never Used   Substance and Sexual Activity     Alcohol use: Yes     Frequency: 2-3 times a week     Drinks per session: 1 or 2     Binge frequency: Never     Comment: occ     Drug use: No     Sexual activity: Not on file   Lifestyle     Physical activity     Days per week: Not on file     Minutes per session: Not on file     Stress: Not on file   Relationships     Social connections     Talks on phone: Not on file     Gets together: Not on file     Attends Roman Catholic service: Not on file     Active member of club or organization: Not on file     Attends meetings of clubs or organizations: Not on file     Relationship status: Not on file     Intimate partner violence     Fear of current or ex partner: Not on file     Emotionally abused: Not on file     Physically abused: Not on file     Forced sexual activity: Not on file   Other Topics Concern      Service Not Asked     Blood Transfusions Not Asked     Caffeine Concern No     Occupational Exposure Not Asked     Hobby Hazards Not Asked     Sleep Concern No     Stress Concern No     Weight Concern No      Special Diet No     Back Care Not Asked     Exercise Yes     Comment: walks daily     Bike Helmet Not Asked     Seat Belt Not Asked     Self-Exams Not Asked     Parent/sibling w/ CABG, MI or angioplasty before 65F 55M? Not Asked   Social History Narrative    .    3 adult children.    No grand children.    Walks dog twice a day x 1 hour each time.            Allergies:   Patient has no known allergies.      Eulalia Miner PA-C  [facility]  Pager: [phone]      Thank you for allowing me to participate in the care of your patient.    Sincerely,     Eulalia Miner PA-C     [facility]

## 2021-02-05 ENCOUNTER — VIRTUAL VISIT (OUTPATIENT)
Dept: CARDIOLOGY | Facility: CLINIC | Age: 66
End: 2021-02-05
Attending: PHYSICIAN ASSISTANT
Payer: COMMERCIAL

## 2021-02-05 DIAGNOSIS — I48.0 PAROXYSMAL ATRIAL FIBRILLATION (H): ICD-10-CM

## 2021-02-05 DIAGNOSIS — I10 BENIGN ESSENTIAL HYPERTENSION: Primary | ICD-10-CM

## 2021-02-05 PROCEDURE — 99213 OFFICE O/P EST LOW 20 MIN: CPT | Mod: 95 | Performed by: PHYSICIAN ASSISTANT

## 2021-02-05 NOTE — LETTER
"2/5/2021    Stephania Laughlin MD  [address] [facility] [address]    RE: Martin Flaherty       Dear Colleague,    I had the pleasure of seeing Martin Flaherty in the [facility].    Martin is a 65 year old who is being evaluated via a billable video visit.      How would you like to obtain your AVS? Mail a copy  If the video visit is dropped, the invitation should be resent by: Text to cell phone: [phone]  Will anyone else be joining your video visit? No       Vitals - Patient Reported  Systolic (Patient Reported): 131  Diastolic (Patient Reported): 86  Weight (Patient Reported): 111.1 kg (245 lb)  Height (Patient Reported): 177.8 cm (5' 10\")  BMI (Based on Pt Reported Ht/Wt): 35.15  Pulse (Patient Reported): 78    Review Of Systems  Skin: NEGATIVE  Eyes:Ears/Nose/Throat: NEGATIVE  Respiratory: NEGATIVE  Cardiovascular:NEGATIVE  Gastrointestinal: NEGATIVE  Genitourinary:NEGATIVE   Musculoskeletal: gout  Neurologic: NEGATIVE  Psychiatric: NEGATIVE  Hematologic/Lymphatic/Immunologic: NEGATIVE  Endocrine:  NEGATIVE    Ruth Villela LPN    Video Start Time: 12:45 PM  Video-Visit Details    Type of service:  Video Visit    Video End Time:12:50 PM    Originating Location (pt. Location): Home    Distant Location (provider location):  [facility] [place]     Platform used for Video Visit: Doximity   _______________________________________      Cardiology Clinic Progress Note    Martin Flaherty MRN# [id number]   YOB: 1955 Age: 65 year old   Primary cardiologist: Dr. Hernandez         Assessment and Plan:     In summary, Martin Flaherty presents today for follow-up on hypertension after the up-titration of amlodipine to 10 mg daily. He will continue to monitor his ambulatory BP's and notify me if, if another week, he's still consistently >130/80 mmHg. Otherwise, we'll plan to have him follow up with Dr. Soni in 1 year. "         History of Presenting Illness:      Martin Flaherty is a pleasant 65 year old patient who presents today to follow-up on hypertension.    The patient has a history of the following -   1.  Paroxysmal atrial fibrillation, PACs and atrial tachycardia.  On flecainide.  Unable to tolerate metoprolol due to fatigue.  Not anticoagulated due to a [id number] score of 1.  2.  Hypertension.  3.  Benign stress echocardiogram in January 2021.  4.  Gout.  5.  Probable TERESE.  6.  Hyperlipidemia.    In brief, Martin met Dr. Zamudio in December 2020.  He was hypertensive and started on amlodipine 5 mg daily.  Since then, we increased it to 10 mg daily. I also referred him to sleep medicine due to symptoms of loud snoring, PND, and daytime somnolence.     Today, his BP's are still suboptimal in the 130's-140's/upper 80's, but over the past couple days it's been steadily trending down. Estimates he's only been on the 10 mg dose for about a week now. No issues with medication side effects. Has not yet heard from sleep medicine, intends to complete a sleep study after he turns 66.          Review of Systems:     12-pt ROS is negative except for as noted in the HPI.          Physical Exam:     Vitals: There were no vitals taken for this visit.  Wt Readings from Last 10 Encounters:   01/22/21 114.7 kg (252 lb 12.8 oz)   12/29/20 114.3 kg (252 lb)   12/18/20 116.6 kg (257 lb)   01/20/20 113.9 kg (251 lb)   06/11/19 115.2 kg (254 lb)   05/08/19 111.1 kg (245 lb)   11/07/18 117 kg (258 lb)   10/03/18 117.1 kg (258 lb 3.2 oz)   05/01/18 116.6 kg (257 lb)   12/30/16 113.4 kg (250 lb)       Constitutional:  Patient is pleasant, alert, cooperative, and in NAD.  HEENT:  NCAT. PERRLA. EOM's intact.   Neck:  CVP appears normal.   Pulmonary: Normal respiratory effort.   Skin:  No rashes or lesions appreciated.   Neurological:  No gross motor or sensory deficits.   Psych: Appropriate affect.        Data:     Labs reviewed:  Recent Labs    Lab Test 01/07/21  0846 10/31/18  0729 11/23/16  0751 06/29/15  0744 11/26/13  1138   LDL 98 59 82 89 82   HDL 27* 27* 27* 33* 28*   NHDL 131* 98 109  --   --    CHOL 158 125 136 143 134   TRIG 165* 195* 133 106 122   TSH  --  3.52 3.00 2.58 2.44   IRON  --   --   --   --  72   FEB  --   --   --   --  434*   IRONSAT  --   --   --   --  17   CARITO  --   --   --   --  9*       Lab Results   Component Value Date    WBC 4.8 05/08/2019    RBC 5.00 05/08/2019    HGB 15.1 05/08/2019    HCT 42.4 05/08/2019    MCV 85 05/08/2019    MCH 30.2 05/08/2019    MCHC 35.6 05/08/2019    RDW 12.5 05/08/2019     (L) 05/08/2019       Lab Results   Component Value Date     01/07/2021    POTASSIUM 4.2 01/07/2021    CHLORIDE 108 01/07/2021    CO2 29 01/07/2021    ANIONGAP 3 01/07/2021     (H) 01/07/2021    BUN 16 01/07/2021    CR 1.17 01/07/2021    GFRESTIMATED 65 01/07/2021    GFRESTBLACK 76 01/07/2021    GAVIN 9.1 01/07/2021      Lab Results   Component Value Date    AST 26 01/07/2021    ALT 42 01/07/2021       Lab Results   Component Value Date    A1C 5.8 (H) 01/07/2021       No results found for: INR        Problem List:     Patient Active Problem List   Diagnosis     Atrial tachycardia (H)     History of atrial fibrillation     Pure hypercholesterolemia     Hypertriglyceridemia     Benign essential hypertension     Morbid obesity (H)     Obesity (BMI 30-39.9)     Chronic gout           Medications:     Current Outpatient Medications   Medication Sig Dispense Refill     allopurinol (ZYLOPRIM) 300 MG tablet Take 1 tablet (300 mg) by mouth daily 90 tablet 3     amLODIPine (NORVASC) 5 MG tablet Take 2 tablets (10 mg) by mouth daily 90 tablet 3     Choline Fenofibrate (FENOFIBRIC ACID) 135 MG CPDR Take 1 capsule by mouth daily 90 capsule 1     colchicine-probenecid (COLBENEMID) 0.5-500 MG tablet Take 1 tablet by mouth 2 times daily 180 tablet 0     flecainide (TAMBOCOR) 50 MG tablet Take 1.5 tablets (75 mg) by mouth 2  times daily 270 tablet 3     omeprazole (PRILOSEC) 20 MG DR capsule Take 1 capsule (20 mg) by mouth daily 90 capsule 3     indomethacin (INDOCIN) 50 MG capsule Take 1 capsule (50 mg) by mouth 3 times daily as needed for moderate pain (gout) (Patient not taking: Reported on 2/5/2021) 30 capsule 0     simvastatin (ZOCOR) 40 MG tablet Take 1 tablet (40 mg) by mouth At Bedtime (Patient not taking: Reported on 1/22/2021) 90 tablet 1           Past Medical History:     Past Medical History:   Diagnosis Date     Atrial tachycardia (H)      Benign essential hypertension      Chronic gout      History of atrial fibrillation     remote; not on AC     Hypertriglyceridemia      Obesity (BMI 30-39.9)      Pure hypercholesterolemia      Past Surgical History:   Procedure Laterality Date     APPENDECTOMY            EP STUDY, MODIFIED  2/2011    Attempted ablation 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     Family History   Problem Relation Age of Onset     Cancer Father 48        metastatic at presentation; unknown primary     Myocardial Infarction Mother         later in life     Hyperlipidemia Brother         multiple brothers     Diabetes No family hx of      Cerebrovascular Disease No family hx of      Coronary Artery Disease Early Onset No family hx of      Colon Cancer No family hx of      Prostate Cancer No family hx of      Social History     Socioeconomic History     Marital status:      Spouse name: Lesley     Number of children: 3     Years of education: 14     Highest education level: Not on file   Occupational History     Occupation: Retired - product support     Employer: [employer]   Social Needs     Financial resource strain: Not on file     Food insecurity     Worry: Not on file     Inability: Not on file     Transportation needs     Medical: Not on file     Non-medical: Not on file   Tobacco Use     Smoking status: Never Smoker     Smokeless tobacco: Never Used   Substance and Sexual Activity      Alcohol use: Yes     Frequency: 2-3 times a week     Drinks per session: 1 or 2     Binge frequency: Never     Comment: occ     Drug use: No     Sexual activity: Not on file   Lifestyle     Physical activity     Days per week: Not on file     Minutes per session: Not on file     Stress: Not on file   Relationships     Social connections     Talks on phone: Not on file     Gets together: Not on file     Attends Anabaptist service: Not on file     Active member of club or organization: Not on file     Attends meetings of clubs or organizations: Not on file     Relationship status: Not on file     Intimate partner violence     Fear of current or ex partner: Not on file     Emotionally abused: Not on file     Physically abused: Not on file     Forced sexual activity: Not on file   Other Topics Concern      Service Not Asked     Blood Transfusions Not Asked     Caffeine Concern No     Occupational Exposure Not Asked     Hobby Hazards Not Asked     Sleep Concern No     Stress Concern No     Weight Concern No     Special Diet No     Back Care Not Asked     Exercise Yes     Comment: walks daily     Bike Helmet Not Asked     Seat Belt Not Asked     Self-Exams Not Asked     Parent/sibling w/ CABG, MI or angioplasty before 65F 55M? Not Asked   Social History Narrative    .    3 adult children.    No grand children.    Walks dog twice a day x 1 hour each time.            Allergies:   Patient has no known allergies.      LINDSAY Crawford [facility]  Pager: [phone]        Thank you for allowing me to participate in the care of your patient.    Sincerely,     LINDSAY Crawford [facility]

## 2021-02-05 NOTE — PROGRESS NOTES
"Martin is a 65 year old who is being evaluated via a billable video visit.      How would you like to obtain your AVS? Mail a copy  If the video visit is dropped, the invitation should be resent by: Text to cell phone: [phone]  Will anyone else be joining your video visit? No       Vitals - Patient Reported  Systolic (Patient Reported): 131  Diastolic (Patient Reported): 86  Weight (Patient Reported): 111.1 kg (245 lb)  Height (Patient Reported): 177.8 cm (5' 10\")  BMI (Based on Pt Reported Ht/Wt): 35.15  Pulse (Patient Reported): 78    Review Of Systems  Skin: NEGATIVE  Eyes:Ears/Nose/Throat: NEGATIVE  Respiratory: NEGATIVE  Cardiovascular:NEGATIVE  Gastrointestinal: NEGATIVE  Genitourinary:NEGATIVE   Musculoskeletal: gout  Neurologic: NEGATIVE  Psychiatric: NEGATIVE  Hematologic/Lymphatic/Immunologic: NEGATIVE  Endocrine:  NEGATIVE    Ruth Villela LPN    Video Start Time: 12:45 PM  Video-Visit Details    Type of service:  Video Visit    Video End Time:12:50 PM    Originating Location (pt. Location): Home    Distant Location (provider location):  [facility]     Platform used for Video Visit: Doximity   _______________________________________      Cardiology Clinic Progress Note    Martin Flaherty MRN# [id number]   YOB: 1955 Age: 65 year old   Primary cardiologist: Dr. Hernandez         Assessment and Plan:     In summary, Martin Flaherty presents today for follow-up on hypertension after the up-titration of amlodipine to 10 mg daily. He will continue to monitor his ambulatory BP's and notify me if, if another week, he's still consistently >130/80 mmHg. Otherwise, we'll plan to have him follow up with Dr. Soni in 1 year.         History of Presenting Illness:      Martin Falherty is a pleasant 65 year old patient who presents today to follow-up on hypertension.    The patient has a history of the following -   1.  Paroxysmal atrial fibrillation, PACs and atrial " tachycardia.  On flecainide.  Unable to tolerate metoprolol due to fatigue.  Not anticoagulated due to a [id number] score of 1.  2.  Hypertension.  3.  Benign stress echocardiogram in January 2021.  4.  Gout.  5.  Probable TERESE.  6.  Hyperlipidemia.    In brief, Martin met Dr. Zamudio in December 2020.  He was hypertensive and started on amlodipine 5 mg daily.  Since then, we increased it to 10 mg daily. I also referred him to sleep medicine due to symptoms of loud snoring, PND, and daytime somnolence.     Today, his BP's are still suboptimal in the 130's-140's/upper 80's, but over the past couple days it's been steadily trending down. Estimates he's only been on the 10 mg dose for about a week now. No issues with medication side effects. Has not yet heard from sleep medicine, intends to complete a sleep study after he turns 66.          Review of Systems:     12-pt ROS is negative except for as noted in the HPI.          Physical Exam:     Vitals: There were no vitals taken for this visit.  Wt Readings from Last 10 Encounters:   01/22/21 114.7 kg (252 lb 12.8 oz)   12/29/20 114.3 kg (252 lb)   12/18/20 116.6 kg (257 lb)   01/20/20 113.9 kg (251 lb)   06/11/19 115.2 kg (254 lb)   05/08/19 111.1 kg (245 lb)   11/07/18 117 kg (258 lb)   10/03/18 117.1 kg (258 lb 3.2 oz)   05/01/18 116.6 kg (257 lb)   12/30/16 113.4 kg (250 lb)       Constitutional:  Patient is pleasant, alert, cooperative, and in NAD.  HEENT:  NCAT. PERRLA. EOM's intact.   Neck:  CVP appears normal.   Pulmonary: Normal respiratory effort.   Skin:  No rashes or lesions appreciated.   Neurological:  No gross motor or sensory deficits.   Psych: Appropriate affect.        Data:     Labs reviewed:  Recent Labs   Lab Test 01/07/21  0846 10/31/18  0729 11/23/16  0751 06/29/15  0744 11/26/13  1138   LDL 98 59 82 89 82   HDL 27* 27* 27* 33* 28*   NHDL 131* 98 109  --   --    CHOL 158 125 136 143 134   TRIG 165* 195* 133 106 122   TSH  --  3.52 3.00 2.58  2.44   IRON  --   --   --   --  72   FEB  --   --   --   --  434*   IRONSAT  --   --   --   --  17   CARITO  --   --   --   --  9*       Lab Results   Component Value Date    WBC 4.8 05/08/2019    RBC 5.00 05/08/2019    HGB 15.1 05/08/2019    HCT 42.4 05/08/2019    MCV 85 05/08/2019    MCH 30.2 05/08/2019    MCHC 35.6 05/08/2019    RDW 12.5 05/08/2019     (L) 05/08/2019       Lab Results   Component Value Date     01/07/2021    POTASSIUM 4.2 01/07/2021    CHLORIDE 108 01/07/2021    CO2 29 01/07/2021    ANIONGAP 3 01/07/2021     (H) 01/07/2021    BUN 16 01/07/2021    CR 1.17 01/07/2021    GFRESTIMATED 65 01/07/2021    GFRESTBLACK 76 01/07/2021    GAVIN 9.1 01/07/2021      Lab Results   Component Value Date    AST 26 01/07/2021    ALT 42 01/07/2021       Lab Results   Component Value Date    A1C 5.8 (H) 01/07/2021       No results found for: INR        Problem List:     Patient Active Problem List   Diagnosis     Atrial tachycardia (H)     History of atrial fibrillation     Pure hypercholesterolemia     Hypertriglyceridemia     Benign essential hypertension     Morbid obesity (H)     Obesity (BMI 30-39.9)     Chronic gout           Medications:     Current Outpatient Medications   Medication Sig Dispense Refill     allopurinol (ZYLOPRIM) 300 MG tablet Take 1 tablet (300 mg) by mouth daily 90 tablet 3     amLODIPine (NORVASC) 5 MG tablet Take 2 tablets (10 mg) by mouth daily 90 tablet 3     Choline Fenofibrate (FENOFIBRIC ACID) 135 MG CPDR Take 1 capsule by mouth daily 90 capsule 1     colchicine-probenecid (COLBENEMID) 0.5-500 MG tablet Take 1 tablet by mouth 2 times daily 180 tablet 0     flecainide (TAMBOCOR) 50 MG tablet Take 1.5 tablets (75 mg) by mouth 2 times daily 270 tablet 3     omeprazole (PRILOSEC) 20 MG DR capsule Take 1 capsule (20 mg) by mouth daily 90 capsule 3     indomethacin (INDOCIN) 50 MG capsule Take 1 capsule (50 mg) by mouth 3 times daily as needed for moderate pain (gout)  (Patient not taking: Reported on 2/5/2021) 30 capsule 0     simvastatin (ZOCOR) 40 MG tablet Take 1 tablet (40 mg) by mouth At Bedtime (Patient not taking: Reported on 1/22/2021) 90 tablet 1           Past Medical History:     Past Medical History:   Diagnosis Date     Atrial tachycardia (H)      Benign essential hypertension      Chronic gout      History of atrial fibrillation     remote; not on AC     Hypertriglyceridemia      Obesity (BMI 30-39.9)      Pure hypercholesterolemia      Past Surgical History:   Procedure Laterality Date     APPENDECTOMY            EP STUDY, MODIFIED  2/2011    Attempted ablation 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     Family History   Problem Relation Age of Onset     Cancer Father 48        metastatic at presentation; unknown primary     Myocardial Infarction Mother         later in life     Hyperlipidemia Brother         multiple brothers     Diabetes No family hx of      Cerebrovascular Disease No family hx of      Coronary Artery Disease Early Onset No family hx of      Colon Cancer No family hx of      Prostate Cancer No family hx of      Social History     Socioeconomic History     Marital status:      Spouse name: Lesley     Number of children: 3     Years of education: 14     Highest education level: Not on file   Occupational History     Occupation: Retired - product support     Employer: [employer]   Social Needs     Financial resource strain: Not on file     Food insecurity     Worry: Not on file     Inability: Not on file     Transportation needs     Medical: Not on file     Non-medical: Not on file   Tobacco Use     Smoking status: Never Smoker     Smokeless tobacco: Never Used   Substance and Sexual Activity     Alcohol use: Yes     Frequency: 2-3 times a week     Drinks per session: 1 or 2     Binge frequency: Never     Comment: occ     Drug use: No     Sexual activity: Not on file   Lifestyle     Physical activity     Days per week: Not on file      Minutes per session: Not on file     Stress: Not on file   Relationships     Social connections     Talks on phone: Not on file     Gets together: Not on file     Attends Orthodoxy service: Not on file     Active member of club or organization: Not on file     Attends meetings of clubs or organizations: Not on file     Relationship status: Not on file     Intimate partner violence     Fear of current or ex partner: Not on file     Emotionally abused: Not on file     Physically abused: Not on file     Forced sexual activity: Not on file   Other Topics Concern      Service Not Asked     Blood Transfusions Not Asked     Caffeine Concern No     Occupational Exposure Not Asked     Hobby Hazards Not Asked     Sleep Concern No     Stress Concern No     Weight Concern No     Special Diet No     Back Care Not Asked     Exercise Yes     Comment: walks daily     Bike Helmet Not Asked     Seat Belt Not Asked     Self-Exams Not Asked     Parent/sibling w/ CABG, MI or angioplasty before 65F 55M? Not Asked   Social History Narrative    .    3 adult children.    No grand children.    Walks dog twice a day x 1 hour each time.            Allergies:   Patient has no known allergies.      Eulalia Miner PA-C  [facility]  Pager: [phone]

## 2021-02-11 ENCOUNTER — CARE COORDINATION (OUTPATIENT)
Dept: CARDIOLOGY | Facility: CLINIC | Age: 66
End: 2021-02-11

## 2021-02-11 DIAGNOSIS — I10 BENIGN ESSENTIAL HYPERTENSION: Primary | ICD-10-CM

## 2021-02-11 DIAGNOSIS — R00.2 PALPITATIONS: ICD-10-CM

## 2021-02-11 NOTE — PROGRESS NOTES
Received call from pt who reports that his BP continues to be higher that the desired range that LINDSAY Mathur discussed at his last visit on 21. Pt reports he is taking amlodipine 5mg BID rather than all 10mg once a day because he thought he recalled LINDSAY Mathur recommended he take amlodipine twice a day. He also has noticed increased intermittent episodes of heart palpitations and lightheadedness and since increasing amlodipine dose from 5mg daily to 5mg BID. He denies any chest pain or pressure. He checks his home BP at random times during the day and recent readings have been runnin/90  131/85  138/88    Discussed with pt that will review with LINDSAY Mathur and callback with her recommendations tomorrow- he agrees with this plan.     JT Gloria 1:21 PM 2021

## 2021-02-12 RX ORDER — LISINOPRIL 2.5 MG/1
2.5 TABLET ORAL DAILY
Qty: 30 TABLET | Refills: 3 | Status: SHIPPED | OUTPATIENT
Start: 2021-02-12 | End: 2021-07-16

## 2021-02-12 NOTE — PROGRESS NOTES
Hi, just a clarification question - when did the lightheadedness and palpitations start? When I spoke with him during our visit (about a week after he increased amlodipine to 10 mg), he did not describe those symptoms. I think a 48 holter monitor would be a good idea to further assess that.     For now, I would keep the dose of amlodipine the same and add on lisinopril 2.5 mg daily. If the BP is still uncontrolled in a week he can increase it to 5 mg daily. Then will need a BMP 2 weeks after that, followed by a Virtual or in-clinic visit with me. Thanks!

## 2021-02-12 NOTE — PROGRESS NOTES
Called and spoke with pt. Requested clarification on when palpitations/lightheadedness started. Pt states he has palpitations occasionally, but noticed they were happening much more frequently and with some lightheadedness for about a week before he called yesterday. He reports that he has not noticed any palpitations for the past day and a half. Discussed with pt that LINDSAY Mathur recommended a 48-Hr Holter monitor to assess further; however, will review with her again to see if a monitor with a longer timeframe might be recommended. Pt verbalized understanding and agrees with this plan.     Pt states his BPs today have been in the 120s/80s. Reviewed LINDSAY Mathur's recommendation for him to start lisinopril 2.5mg daily based on the BPs he reported yesterday. Offered to follow up with pt next week to see if SBPs are consistently higher or lower than 130 before sending lisinopril Rx. Pt asks that 30-day lisinopril Rx be sent to [facility] pharmacy in [place]. He will continue to check his BP over the weekend and if consistently >130 will start taking lisinopril 2.5mg daily.     Will plan to follow up with pt early next week for heart monitor recommendation and BP/lisinopril update.     JT Gloria 5:00 PM 2/12/2021

## 2021-02-16 NOTE — PROGRESS NOTES
Called pt, no answer, left voicemail requesting return call for update on his BP and if he started lisinopril or not. Also discussed that have an update on heart monitor recommendation from LINDSAY Mathur to review.     Awaiting return call.    JT Gloria 4:59 PM 2/16/2021

## 2021-02-23 NOTE — PROGRESS NOTES
Called and spoke with pt. He confirms he did start lisinopril 2.5mg daily about 1 week ago and has noticed BPs running in the 120s/70s consistently since then. He also has not had as many or long-lasting episodes of palpitations and lightheadedness like he had been experiencing for a few days 1.5 weeks ago. He reports very few episodes of palpitations/lightheadedness and only lasting 10 seconds or less. Reviewed that LINDSAY Mathur recommends a 3-day Ziopatch monitor to assess. Also reviewed need for BMP 2 weeks after starting lisinopril and a follow up visit with LINDSAY Mathur as well. Pt verbalized understanding and requested scheduling call him to arrange appts. Message sent to scheduling.     JT Gloria 3:43 PM 2/23/2021

## 2021-03-03 DIAGNOSIS — I47.10 PAROXYSMAL SUPRAVENTRICULAR TACHYCARDIA (H): ICD-10-CM

## 2021-03-03 RX ORDER — AMLODIPINE BESYLATE 5 MG/1
10 TABLET ORAL DAILY
Qty: 180 TABLET | Refills: 2 | Status: SHIPPED | OUTPATIENT
Start: 2021-03-03 | End: 2021-09-14

## 2021-03-08 DIAGNOSIS — K21.9 GASTROESOPHAGEAL REFLUX DISEASE WITHOUT ESOPHAGITIS: ICD-10-CM

## 2021-03-09 NOTE — TELEPHONE ENCOUNTER
Prilosec    Routing refill request to provider for review/approval because:  Patient newly established. Please advise if ok with taking over prescripiton

## 2021-03-19 ENCOUNTER — TELEPHONE (OUTPATIENT)
Dept: INTERNAL MEDICINE | Facility: CLINIC | Age: 66
End: 2021-03-19

## 2021-03-30 DIAGNOSIS — M1A.0720 IDIOPATHIC CHRONIC GOUT OF LEFT FOOT WITHOUT TOPHUS: ICD-10-CM

## 2021-03-31 RX ORDER — PROBENECID AND COLCHICINE .5; 5 MG/1; MG/1
TABLET ORAL
Qty: 180 TABLET | Refills: 0 | Status: SHIPPED | OUTPATIENT
Start: 2021-03-31 | End: 2021-07-27

## 2021-03-31 NOTE — TELEPHONE ENCOUNTER
Routing refill request to provider for review/approval because:  Labs not current:  CBC, Uric Acid

## 2021-07-16 DIAGNOSIS — I10 BENIGN ESSENTIAL HYPERTENSION: ICD-10-CM

## 2021-07-16 RX ORDER — LISINOPRIL 2.5 MG/1
2.5 TABLET ORAL DAILY
Qty: 30 TABLET | Refills: 2 | Status: SHIPPED | OUTPATIENT
Start: 2021-07-16 | End: 2021-09-14

## 2021-07-26 DIAGNOSIS — M1A.0720 IDIOPATHIC CHRONIC GOUT OF LEFT FOOT WITHOUT TOPHUS: ICD-10-CM

## 2021-07-27 RX ORDER — PROBENECID AND COLCHICINE .5; 5 MG/1; MG/1
TABLET ORAL
Qty: 180 TABLET | Refills: 1 | OUTPATIENT
Start: 2021-07-27

## 2021-07-27 NOTE — TELEPHONE ENCOUNTER
Routing refill request to provider for review/approval because:  Fails protcol for gout labs, was last seen in December. Pended 6 mo for review.   Carline Moss RN

## 2021-08-03 ENCOUNTER — MYC MEDICAL ADVICE (OUTPATIENT)
Dept: INTERNAL MEDICINE | Facility: CLINIC | Age: 66
End: 2021-08-03

## 2021-08-03 NOTE — TELEPHONE ENCOUNTER
"Pt calling about the colchicine-probenecid (COLBENEMID) 0.5-500 MG tablet.   Informed that he should discontinue med.     OV notes from 12/29/2020:  \"Plan: START allopurinol 300 mg daily, long-term; START colchicine-probenecid twice a day for 3 months only; HOLD simvastatin while on colchicine (may resume after completing colchicine); refills of indomethacin provided in case of acute gouty flares.\"    He thought it was the other way around - that he was supposed to take allopurinol for 3 months, & take colchicine-probenecid longterm.   "

## 2021-08-05 ENCOUNTER — VIRTUAL VISIT (OUTPATIENT)
Dept: INTERNAL MEDICINE | Facility: CLINIC | Age: 66
End: 2021-08-05
Payer: MEDICARE

## 2021-08-05 DIAGNOSIS — E66.01 SEVERE OBESITY (BMI 35.0-35.9 WITH COMORBIDITY) (H): ICD-10-CM

## 2021-08-05 DIAGNOSIS — E78.5 HYPERLIPIDEMIA LDL GOAL <130: ICD-10-CM

## 2021-08-05 DIAGNOSIS — Z12.5 SCREENING FOR PROSTATE CANCER: ICD-10-CM

## 2021-08-05 DIAGNOSIS — M1A.9XX0 CHRONIC GOUT WITHOUT TOPHUS, UNSPECIFIED CAUSE, UNSPECIFIED SITE: Primary | ICD-10-CM

## 2021-08-05 DIAGNOSIS — I47.10 PAROXYSMAL SUPRAVENTRICULAR TACHYCARDIA (H): ICD-10-CM

## 2021-08-05 DIAGNOSIS — I10 BENIGN ESSENTIAL HYPERTENSION: ICD-10-CM

## 2021-08-05 DIAGNOSIS — Z13.1 SCREENING FOR DIABETES MELLITUS: ICD-10-CM

## 2021-08-05 PROCEDURE — 99214 OFFICE O/P EST MOD 30 MIN: CPT | Mod: 95 | Performed by: INTERNAL MEDICINE

## 2021-08-05 RX ORDER — PROBENECID AND COLCHICINE .5; 5 MG/1; MG/1
1 TABLET ORAL DAILY
COMMUNITY
End: 2021-08-05

## 2021-08-05 NOTE — PROGRESS NOTES
Martin is a 66 year old who is being evaluated via a billable telephone visit.      What phone number would you like to be contacted at? [phone]  How would you like to obtain your AVS? [facility]    Assessment & Plan   Problem List Items Addressed This Visit     Benign essential hypertension    Relevant Orders    Comprehensive metabolic panel    CBC with platelets    Severe obesity (BMI 35.0-35.9 with comorbidity) (H)    Relevant Orders    Lipid panel reflex to direct LDL Fasting    Comprehensive metabolic panel    Hemoglobin A1c    CBC with platelets    **Uric acid FUTURE 2mo    Chronic gout - Primary    Relevant Orders    Comprehensive metabolic panel    CBC with platelets    **Uric acid FUTURE 2mo      Other Visit Diagnoses     Hyperlipidemia with native TG > 500; LDL goal <130        Relevant Orders    Lipid panel reflex to direct LDL Fasting    Comprehensive metabolic panel    Paroxysmal supraventricular tachycardia (H)        Screening for prostate cancer        Relevant Orders    PSA, screen    Screening for diabetes mellitus        Relevant Orders    Comprehensive metabolic panel    Hemoglobin A1c           *  Discontinue colchicine/probenecid combination tablet, is no longer needed.  Colchicine also has a potential interaction with simvastatin which may cause higher rates of muscle aching.    *  Start allopurinol 300 mg once per day every day.  This will lower uric acid levels, reduce the chance of future gout attacks.    *  Avoid excessive alcohol, as alcohol can be a significant contributor to future gout attacks.    *  Remain well-hydrated, aim for 6 to 8 glasses of water every day.    *  Continue all other medications at the same doses.  Contact your usual pharmacy if you need refills.     *  Return to see Dr. Laughlin for an annual physical (or Annual Medicare Wellness Exam) n 3-4 months, sooner if needed.    Please get fasting labs done at the [facility] or any other [facility]  "lab 1-2 days before this appointment (schedule a \"lab appointment\").  If you get the labs done at another clinic, make arrangements with them directly.  The orders will be in place.  Eat nothing for at least 8 hours prior to having these labs drawn.  Use [employer] or Call [phone] to schedule the appointment with me and lab appointment.                   BMI:   Estimated body mass index is 36.27 kg/m  as calculated from the following:    Height as of 1/22/21: 1.778 m (5' 10\").    Weight as of 1/22/21: 114.7 kg (252 lb 12.8 oz).           Return in about 3 months (around 11/5/2021) for in person Office visit, Annual physical, With your primary MD, with pre-visit fasting labs.    Tk Rose MD  [facility] Center[facility]ODIE Salazar is a 66 year old who presents for the following health issues         Medication Followup of allopurinol and colbenemid     Taking Medication as prescribed: NO    Side Effects:  None    Medication Helping Symptoms:  Yes    Long history of gout attacks with over 10 attacks in the last 15 years..  Takes indomethacin for acute flares.  He has never taken prophylactic uric acid lowering medicine in the past.  Was started on probenecid/colchicine combination tablet at last visit in December 2020.  Also started on allopurinol at that time.  Was told to discontinue colchicine, and continue allopurinol.  He was taken off simvastatin while he was on the colchicine due to drug interaction.  He was confused as to what medicine he was to continue whether it was the allopurinol versus the other.  He denied side effects with either medication.     2.  History of hyperlipidemia, on simvastatin and fenofibrate most recent labs reviewed.    Recent Labs   Lab Test 01/07/21  0846 10/31/18  0729 06/29/15  0744 11/26/13  1138   CHOL 158 125 143 134   HDL 27* 27* 33* 28*   LDL 98 59 89 82   TRIG 165* 195* 106 122   CHOLHDLRATIO  --   --  4.3 4.9          Review of " Systems         Objective    Vitals - Patient Reported  Weight (Patient Reported): 108.9 kg (240 lb)      Vitals:  No vitals were obtained today due to virtual visit.    Physical Exam   healthy, alert and no distress  PSYCH: Alert and oriented times 3; coherent speech, normal   rate and volume, able to articulate logical thoughts, able   to abstract reason, no tangential thoughts, no hallucinations   or delusions  His affect is normal  RESP: No cough, no audible wheezing, able to talk in full sentences  Remainder of exam unable to be completed due to telephone visits                Phone call duration: 14:45 minutes

## 2021-08-05 NOTE — PATIENT INSTRUCTIONS
"*  Discontinue colchicine/probenecid combination tablet, is no longer needed.  Colchicine also has a potential interaction with simvastatin which may cause higher rates of muscle aching.    *  Start allopurinol 300 mg once per day every day.  This will lower uric acid levels, reduce the chance of future gout attacks.    *  Avoid excessive alcohol, as alcohol can be a significant contributor to future gout attacks.    *  Remain well-hydrated, aim for 6 to 8 glasses of water every day.    *  Continue all other medications at the same doses.  Contact your usual pharmacy if you need refills.     *  Return to see Dr. Laughlin for an annual physical (or Annual Medicare Wellness Exam) n 3-4 months, sooner if needed.    Please get fasting labs done at the [facility] or any other [facility] Lab lab 1-2 days before this appointment (schedule a \"lab appointment\").  If you get the labs done at another clinic, make arrangements with them directly.  The orders will be in place.  Eat nothing for at least 8 hours prior to having these labs drawn.  Use [employer] or Call [phone] to schedule the appointment with me and lab appointment.      "

## 2021-08-31 DIAGNOSIS — I10 BENIGN ESSENTIAL HYPERTENSION: Primary | ICD-10-CM

## 2021-09-13 ENCOUNTER — LAB (OUTPATIENT)
Dept: LAB | Facility: CLINIC | Age: 66
End: 2021-09-13
Payer: MEDICARE

## 2021-09-13 DIAGNOSIS — I10 BENIGN ESSENTIAL HYPERTENSION: ICD-10-CM

## 2021-09-13 LAB
ANION GAP SERPL CALCULATED.3IONS-SCNC: 2 MMOL/L (ref 3–14)
BUN SERPL-MCNC: 17 MG/DL (ref 7–30)
CALCIUM SERPL-MCNC: 8.7 MG/DL (ref 8.5–10.1)
CHLORIDE BLD-SCNC: 109 MMOL/L (ref 94–109)
CO2 SERPL-SCNC: 28 MMOL/L (ref 20–32)
CREAT SERPL-MCNC: 1.43 MG/DL (ref 0.66–1.25)
GFR SERPL CREATININE-BSD FRML MDRD: 51 ML/MIN/1.73M2
GLUCOSE BLD-MCNC: 102 MG/DL (ref 70–99)
POTASSIUM BLD-SCNC: 4 MMOL/L (ref 3.4–5.3)
SODIUM SERPL-SCNC: 139 MMOL/L (ref 133–144)

## 2021-09-13 PROCEDURE — 36415 COLL VENOUS BLD VENIPUNCTURE: CPT | Performed by: PHYSICIAN ASSISTANT

## 2021-09-13 PROCEDURE — 80048 BASIC METABOLIC PNL TOTAL CA: CPT | Performed by: PHYSICIAN ASSISTANT

## 2021-09-14 ENCOUNTER — OFFICE VISIT (OUTPATIENT)
Dept: CARDIOLOGY | Facility: CLINIC | Age: 66
End: 2021-09-14
Attending: PHYSICIAN ASSISTANT
Payer: MEDICARE

## 2021-09-14 VITALS
DIASTOLIC BLOOD PRESSURE: 77 MMHG | BODY MASS INDEX: 34.93 KG/M2 | SYSTOLIC BLOOD PRESSURE: 126 MMHG | HEART RATE: 65 BPM | WEIGHT: 244 LBS | HEIGHT: 70 IN

## 2021-09-14 DIAGNOSIS — N18.31 STAGE 3A CHRONIC KIDNEY DISEASE (H): Primary | ICD-10-CM

## 2021-09-14 DIAGNOSIS — E78.1 HYPERTRIGLYCERIDEMIA: ICD-10-CM

## 2021-09-14 DIAGNOSIS — I47.10 PAROXYSMAL SUPRAVENTRICULAR TACHYCARDIA (H): ICD-10-CM

## 2021-09-14 DIAGNOSIS — I10 BENIGN ESSENTIAL HYPERTENSION: ICD-10-CM

## 2021-09-14 PROBLEM — N18.30 CHRONIC KIDNEY DISEASE, STAGE 3 (H): Status: ACTIVE | Noted: 2021-09-14

## 2021-09-14 PROCEDURE — 99214 OFFICE O/P EST MOD 30 MIN: CPT | Performed by: PHYSICIAN ASSISTANT

## 2021-09-14 RX ORDER — LISINOPRIL 2.5 MG/1
2.5 TABLET ORAL DAILY
Qty: 90 TABLET | Refills: 3 | Status: SHIPPED | OUTPATIENT
Start: 2021-09-14 | End: 2023-02-22

## 2021-09-14 RX ORDER — AMLODIPINE BESYLATE 10 MG/1
10 TABLET ORAL DAILY
Qty: 90 TABLET | Refills: 3 | Status: SHIPPED | OUTPATIENT
Start: 2021-09-14 | End: 2022-02-15

## 2021-09-14 ASSESSMENT — MIFFLIN-ST. JEOR: SCORE: 1898.03

## 2021-09-14 NOTE — PROGRESS NOTES
Cardiology Clinic Progress Note    Martin Flaherty MRN# [id number]   YOB: 1955 Age: 66 year old   Primary cardiologist: Dr. Soni         Assessment and Plan:     In summary, Martin Flaherty presents today for follow-up on hypertension. His BP is well-controlled. Slight ELIZABETH with a creat of 1.4; has varied between 1-1.3 over the years.     Plan:  - Will continue current doses of amlodipine and lisinopril; refilled today.  - He's due for an annual visit with his PCP, renal function should be re-checked then. If his creatinine remains elevated, would consider a referral to nephrology.   - We discussed that, given his now age of 66, he has a [id number] score of 2, and systemic anticoagulation should be considered. I've asked him to follow up with Dr. Poe in the next 2 months, as he's due. Will see if he would like a zio monitor placed beforehand, and let the patient know.  -Reminded him to complete a sleep study when able.    Follow-up:  -With Dr. Soni in 1 year.    Addendum: Dr. Poe feels no need for zio monitor at this time.        History of Presenting Illness:      Martin Flaherty is a pleasant 66 year old patient who presents today to follow-up on hypertension.    The patient has a history of the following -   1.  Paroxysmal atrial fibrillation, PACs and atrial tachycardia.  On flecainide.  Unable to tolerate metoprolol due to fatigue.  Not anticoagulated due to a [id number] score of 1.  2.  Hypertension  3.  Benign stress echocardiogram in January 2021.  4.  Gout  5.  Probable TERESE    In brief, Martin met Dr. Zamudio beginning of this year.  He was hypertensive and started on amlodipine 5 mg daily, which was later up-titrated to 10 mg daily. Lisinopril was then added.    Today, blood pressure is well controlled.  BMP shows a creatinine of 1.4, which is up from 1.17 in January. Over the years, creat has varied between 1 - 1.3. Only uses indomethacin 2-3 times per year, no frequent  "NSAID use. States he tries to avoid medications in general. He's lost about 8 lbs since January, which I congratulated him on. His wife states he doesn't snore anymore. They used to sleep in separate rooms for this reason. Still has daytime somnolence and takes naps - hasn't yet had sleep study performed. Otherwise, he feels generally well, and denies chest pain, shortness of breath, PND, orthopnea, edema, claudication, palpitations, near syncope or syncope. He's had no symptoms suggestive of recurrent atrial fibrillation or TIA's.         Review of Systems:     12-pt ROS is negative except for as noted in the HPI.          Physical Exam:     Vitals: /77   Pulse 65   Ht 1.778 m (5' 10\")   Wt 110.7 kg (244 lb)   BMI 35.01 kg/m    Wt Readings from Last 10 Encounters:   09/14/21 110.7 kg (244 lb)   01/22/21 114.7 kg (252 lb 12.8 oz)   12/29/20 114.3 kg (252 lb)   12/18/20 116.6 kg (257 lb)   01/20/20 113.9 kg (251 lb)   06/11/19 115.2 kg (254 lb)   05/08/19 111.1 kg (245 lb)   11/07/18 117 kg (258 lb)   10/03/18 117.1 kg (258 lb 3.2 oz)   05/01/18 116.6 kg (257 lb)       Constitutional:  Patient is pleasant, alert, cooperative, and in NAD.  HEENT:  NCAT. PERRLA. EOM's intact.   Neck:  CVP appears normal. No carotid bruits.   Pulmonary: Normal respiratory effort. CTAB.   Cardiac: RRR, normal S1/S2, no S3/S4, no murmur or rub.   Abdomen:  Non-tender abdomen, no hepatosplenomegaly appreciated.   Vascular: Pulses in the upper and lower extremities are 2+ and equal bilaterally.  Extremities: No edema, erythema, cyanosis or tenderness appreciated.  Skin:  No rashes or lesions appreciated.   Neurological:  No gross motor or sensory deficits.   Psych: Appropriate affect.        Data:     Labs reviewed:  Recent Labs   Lab Test 01/07/21  0846 10/31/18  0729 11/23/16  0751 06/29/15  0744 11/26/13  1138   LDL 98 59 82 89 82   HDL 27* 27* 27* 33* 28*   NHDL 131* 98 109  --   --    CHOL 158 125 136 143 134   TRIG 165* 195* " 133 106 122   TSH  --  3.52 3.00 2.58 2.44   IRON  --   --   --   --  72   FEB  --   --   --   --  434*   IRONSAT  --   --   --   --  17   CARITO  --   --   --   --  9*       Lab Results   Component Value Date    WBC 4.8 05/08/2019    RBC 5.00 05/08/2019    HGB 15.1 05/08/2019    HCT 42.4 05/08/2019    MCV 85 05/08/2019    MCH 30.2 05/08/2019    MCHC 35.6 05/08/2019    RDW 12.5 05/08/2019     (L) 05/08/2019       Lab Results   Component Value Date     09/13/2021     01/07/2021    POTASSIUM 4.0 09/13/2021    POTASSIUM 4.2 01/07/2021    CHLORIDE 109 09/13/2021    CHLORIDE 108 01/07/2021    CO2 28 09/13/2021    CO2 29 01/07/2021    ANIONGAP 2 (L) 09/13/2021    ANIONGAP 3 01/07/2021     (H) 09/13/2021     (H) 01/07/2021    BUN 17 09/13/2021    BUN 16 01/07/2021    CR 1.43 (H) 09/13/2021    CR 1.17 01/07/2021    GFRESTIMATED 51 (L) 09/13/2021    GFRESTIMATED 65 01/07/2021    GFRESTBLACK 76 01/07/2021    GAVIN 8.7 09/13/2021    GAVIN 9.1 01/07/2021      Lab Results   Component Value Date    AST 26 01/07/2021    ALT 42 01/07/2021       Lab Results   Component Value Date    A1C 5.8 (H) 01/07/2021       No results found for: INR        Problem List:     Patient Active Problem List   Diagnosis     Atrial tachycardia (H)     History of atrial fibrillation     Pure hypercholesterolemia     Hypertriglyceridemia     Benign essential hypertension     Severe obesity (BMI 35.0-35.9 with comorbidity) (H)     Obesity (BMI 30-39.9)     Chronic gout           Medications:     Current Outpatient Medications   Medication Sig Dispense Refill     allopurinol (ZYLOPRIM) 300 MG tablet Take 1 tablet (300 mg) by mouth daily 90 tablet 3     amLODIPine (NORVASC) 5 MG tablet Take 2 tablets (10 mg) by mouth daily 180 tablet 2     Choline Fenofibrate (FENOFIBRIC ACID) 135 MG CPDR Take 1 capsule by mouth daily 90 capsule 1     flecainide (TAMBOCOR) 50 MG tablet Take 1.5 tablets (75 mg) by mouth 2 times daily 270 tablet 3      indomethacin (INDOCIN) 50 MG capsule Take 1 capsule (50 mg) by mouth 3 times daily as needed for moderate pain (gout) 30 capsule 0     lisinopril (ZESTRIL) 2.5 MG tablet Take 1 tablet (2.5 mg) by mouth daily Please call for an appointment. [phone] 30 tablet 2     omeprazole (PRILOSEC) 20 MG DR capsule Take 1 capsule (20 mg) by mouth daily 90 capsule 3     simvastatin (ZOCOR) 40 MG tablet Take 1 tablet (40 mg) by mouth At Bedtime 90 tablet 1           Past Medical History:     Past Medical History:   Diagnosis Date     Atrial tachycardia (H)      Benign essential hypertension      Chronic gout      History of atrial fibrillation     remote; not on AC     Hypertriglyceridemia      Obesity (BMI 30-39.9)      Pure hypercholesterolemia      Past Surgical History:   Procedure Laterality Date     APPENDECTOMY            EP STUDY, MODIFIED  2/2011    Attempted ablation 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     Family History   Problem Relation Age of Onset     Cancer Father 48        metastatic at presentation; unknown primary     Myocardial Infarction Mother         later in life     Hyperlipidemia Brother         multiple brothers     Diabetes No family hx of      Cerebrovascular Disease No family hx of      Coronary Artery Disease Early Onset No family hx of      Colon Cancer No family hx of      Prostate Cancer No family hx of      Social History     Socioeconomic History     Marital status:      Spouse name: Lesley     Number of children: 3     Years of education: 14     Highest education level: Not on file   Occupational History     Occupation: Retired - product support     Employer: [employer]   Tobacco Use     Smoking status: Never Smoker     Smokeless tobacco: Never Used   Substance and Sexual Activity     Alcohol use: Yes     Comment: occ     Drug use: No     Sexual activity: Not on file   Other Topics Concern      Service Not Asked     Blood Transfusions Not Asked     Caffeine Concern No      Occupational Exposure Not Asked     Hobby Hazards Not Asked     Sleep Concern No     Stress Concern No     Weight Concern No     Special Diet No     Back Care Not Asked     Exercise Yes     Comment: walks daily     Bike Helmet Not Asked     Seat Belt Not Asked     Self-Exams Not Asked     Parent/sibling w/ CABG, MI or angioplasty before 65F 55M? Not Asked   Social History Narrative    .    3 adult children.    No grand children.    Walks dog twice a day x 1 hour each time.      Social Determinants of Health     Financial Resource Strain:      Difficulty of Paying Living Expenses:    Food Insecurity:      Worried About Running Out of Food in the Last Year:      Ran Out of Food in the Last Year:    Transportation Needs:      Lack of Transportation (Medical):      Lack of Transportation (Non-Medical):    Physical Activity:      Days of Exercise per Week:      Minutes of Exercise per Session:    Stress:      Feeling of Stress :    Social Connections:      Frequency of Communication with Friends and Family:      Frequency of Social Gatherings with Friends and Family:      Attends Manda[employer] Services:      Active Member of Clubs or Organizations:      Attends Club or Organization Meetings:      Marital Status:    Intimate Partner Violence:      Fear of Current or Ex-Partner:      Emotionally Abused:      Physically Abused:      Sexually Abused:            Allergies:   Patient has no known allergies.      LINDSAY [facility]  Pager: [phone]

## 2021-09-14 NOTE — LETTER
9/14/2021    Stephania Laughlin MD  [address] [facility] [address]    RE: Martin Flaherty       Dear Colleague,    I had the pleasure of seeing Martin Flaherty in the [facility].      Cardiology Clinic Progress Note    Martin Flaherty MRN# [id number]   YOB: 1955 Age: 66 year old   Primary cardiologist: Dr. Soni         Assessment and Plan:     In summary, Martin Flaherty presents today for follow-up on hypertension. His BP is well-controlled. Slight ELIZABETH with a creat of 1.4; has varied between 1-1.3 over the years.     Plan:  - Will continue current doses of amlodipine and lisinopril; refilled today.  - He's due for an annual visit with his PCP, renal function should be re-checked then. If his creatinine remains elevated, would consider a referral to nephrology.   - We discussed that, given his now age of 66, he has a [id number] score of 2, and systemic anticoagulation should be considered. I've asked him to follow up with Dr. Poe in the next 2 months, as he's due. Will see if he would like a zio monitor placed beforehand, and let the patient know.  -Reminded him to complete a sleep study when able.    Follow-up:  -With Dr. Soni in 1 year.    Addendum: Dr. Poe feels no need for zio monitor at this time.        History of Presenting Illness:      Martin Flaherty is a pleasant 66 year old patient who presents today to follow-up on hypertension.    The patient has a history of the following -   1.  Paroxysmal atrial fibrillation, PACs and atrial tachycardia.  On flecainide.  Unable to tolerate metoprolol due to fatigue.  Not anticoagulated due to a [id number] score of 1.  2.  Hypertension  3.  Benign stress echocardiogram in January 2021.  4.  Gout  5.  Probable TERESE    In brief, Martin met Dr. Zamudio beginning of this year.  He was hypertensive and started on amlodipine 5 mg daily, which was later up-titrated to 10 mg  "daily. Lisinopril was then added.    Today, blood pressure is well controlled.  BMP shows a creatinine of 1.4, which is up from 1.17 in January. Over the years, creat has varied between 1 - 1.3. Only uses indomethacin 2-3 times per year, no frequent NSAID use. States he tries to avoid medications in general. He's lost about 8 lbs since January, which I congratulated him on. His wife states he doesn't snore anymore. They used to sleep in separate rooms for this reason. Still has daytime somnolence and takes naps - hasn't yet had sleep study performed. Otherwise, he feels generally well, and denies chest pain, shortness of breath, PND, orthopnea, edema, claudication, palpitations, near syncope or syncope. He's had no symptoms suggestive of recurrent atrial fibrillation or TIA's.         Review of Systems:     12-pt ROS is negative except for as noted in the HPI.          Physical Exam:     Vitals: /77   Pulse 65   Ht 1.778 m (5' 10\")   Wt 110.7 kg (244 lb)   BMI 35.01 kg/m    Wt Readings from Last 10 Encounters:   09/14/21 110.7 kg (244 lb)   01/22/21 114.7 kg (252 lb 12.8 oz)   12/29/20 114.3 kg (252 lb)   12/18/20 116.6 kg (257 lb)   01/20/20 113.9 kg (251 lb)   06/11/19 115.2 kg (254 lb)   05/08/19 111.1 kg (245 lb)   11/07/18 117 kg (258 lb)   10/03/18 117.1 kg (258 lb 3.2 oz)   05/01/18 116.6 kg (257 lb)       Constitutional:  Patient is pleasant, alert, cooperative, and in NAD.  HEENT:  NCAT. PERRLA. EOM's intact.   Neck:  CVP appears normal. No carotid bruits.   Pulmonary: Normal respiratory effort. CTAB.   Cardiac: RRR, normal S1/S2, no S3/S4, no murmur or rub.   Abdomen:  Non-tender abdomen, no hepatosplenomegaly appreciated.   Vascular: Pulses in the upper and lower extremities are 2+ and equal bilaterally.  Extremities: No edema, erythema, cyanosis or tenderness appreciated.  Skin:  No rashes or lesions appreciated.   Neurological:  No gross motor or sensory deficits.   Psych: Appropriate affect. "        Data:     Labs reviewed:  Recent Labs   Lab Test 01/07/21  0846 10/31/18  0729 11/23/16  0751 06/29/15  0744 11/26/13  1138   LDL 98 59 82 89 82   HDL 27* 27* 27* 33* 28*   NHDL 131* 98 109  --   --    CHOL 158 125 136 143 134   TRIG 165* 195* 133 106 122   TSH  --  3.52 3.00 2.58 2.44   IRON  --   --   --   --  72   FEB  --   --   --   --  434*   IRONSAT  --   --   --   --  17   CARITO  --   --   --   --  9*       Lab Results   Component Value Date    WBC 4.8 05/08/2019    RBC 5.00 05/08/2019    HGB 15.1 05/08/2019    HCT 42.4 05/08/2019    MCV 85 05/08/2019    MCH 30.2 05/08/2019    MCHC 35.6 05/08/2019    RDW 12.5 05/08/2019     (L) 05/08/2019       Lab Results   Component Value Date     09/13/2021     01/07/2021    POTASSIUM 4.0 09/13/2021    POTASSIUM 4.2 01/07/2021    CHLORIDE 109 09/13/2021    CHLORIDE 108 01/07/2021    CO2 28 09/13/2021    CO2 29 01/07/2021    ANIONGAP 2 (L) 09/13/2021    ANIONGAP 3 01/07/2021     (H) 09/13/2021     (H) 01/07/2021    BUN 17 09/13/2021    BUN 16 01/07/2021    CR 1.43 (H) 09/13/2021    CR 1.17 01/07/2021    GFRESTIMATED 51 (L) 09/13/2021    GFRESTIMATED 65 01/07/2021    GFRESTBLACK 76 01/07/2021    GAVIN 8.7 09/13/2021    GAVIN 9.1 01/07/2021      Lab Results   Component Value Date    AST 26 01/07/2021    ALT 42 01/07/2021       Lab Results   Component Value Date    A1C 5.8 (H) 01/07/2021       No results found for: INR        Problem List:     Patient Active Problem List   Diagnosis     Atrial tachycardia (H)     History of atrial fibrillation     Pure hypercholesterolemia     Hypertriglyceridemia     Benign essential hypertension     Severe obesity (BMI 35.0-35.9 with comorbidity) (H)     Obesity (BMI 30-39.9)     Chronic gout           Medications:     Current Outpatient Medications   Medication Sig Dispense Refill     allopurinol (ZYLOPRIM) 300 MG tablet Take 1 tablet (300 mg) by mouth daily 90 tablet 3     amLODIPine (NORVASC) 5 MG tablet  Take 2 tablets (10 mg) by mouth daily 180 tablet 2     Choline Fenofibrate (FENOFIBRIC ACID) 135 MG CPDR Take 1 capsule by mouth daily 90 capsule 1     flecainide (TAMBOCOR) 50 MG tablet Take 1.5 tablets (75 mg) by mouth 2 times daily 270 tablet 3     indomethacin (INDOCIN) 50 MG capsule Take 1 capsule (50 mg) by mouth 3 times daily as needed for moderate pain (gout) 30 capsule 0     lisinopril (ZESTRIL) 2.5 MG tablet Take 1 tablet (2.5 mg) by mouth daily Please call for an appointment. [phone] 30 tablet 2     omeprazole (PRILOSEC) 20 MG DR capsule Take 1 capsule (20 mg) by mouth daily 90 capsule 3     simvastatin (ZOCOR) 40 MG tablet Take 1 tablet (40 mg) by mouth At Bedtime 90 tablet 1           Past Medical History:     Past Medical History:   Diagnosis Date     Atrial tachycardia (H)      Benign essential hypertension      Chronic gout      History of atrial fibrillation     remote; not on AC     Hypertriglyceridemia      Obesity (BMI 30-39.9)      Pure hypercholesterolemia      Past Surgical History:   Procedure Laterality Date     APPENDECTOMY            EP STUDY, MODIFIED  2/2011    Attempted ablation 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     Family History   Problem Relation Age of Onset     Cancer Father 48        metastatic at presentation; unknown primary     Myocardial Infarction Mother         later in life     Hyperlipidemia Brother         multiple brothers     Diabetes No family hx of      Cerebrovascular Disease No family hx of      Coronary Artery Disease Early Onset No family hx of      Colon Cancer No family hx of      Prostate Cancer No family hx of      Social History     Socioeconomic History     Marital status:      Spouse name: Lesley     Number of children: 3     Years of education: 14     Highest education level: Not on file   Occupational History     Occupation: Retired - product support     Employer: [employer]   Tobacco Use     Smoking status: Never Smoker      Smokeless tobacco: Never Used   Substance and Sexual Activity     Alcohol use: Yes     Comment: occ     Drug use: No     Sexual activity: Not on file   Other Topics Concern      Service Not Asked     Blood Transfusions Not Asked     Caffeine Concern No     Occupational Exposure Not Asked     Hobby Hazards Not Asked     Sleep Concern No     Stress Concern No     Weight Concern No     Special Diet No     Back Care Not Asked     Exercise Yes     Comment: walks daily     Bike Helmet Not Asked     Seat Belt Not Asked     Self-Exams Not Asked     Parent/sibling w/ CABG, MI or angioplasty before 65F 55M? Not Asked   Social History Narrative    .    3 adult children.    No grand children.    Walks dog twice a day x 1 hour each time.      Social Determinants of Health     Financial Resource Strain:      Difficulty of Paying Living Expenses:    Food Insecurity:      Worried About Running Out of Food in the Last Year:      Ran Out of Food in the Last Year:    Transportation Needs:      Lack of Transportation (Medical):      Lack of Transportation (Non-Medical):    Physical Activity:      Days of Exercise per Week:      Minutes of Exercise per Session:    Stress:      Feeling of Stress :    Social Connections:      Frequency of Communication with Friends and Family:      Frequency of Social Gatherings with Friends and Family:      Attends [employer] Services:      Active Member of Clubs or Organizations:      Attends Club or Organization Meetings:      Marital Status:    Intimate Partner Violence:      Fear of Current or Ex-Partner:      Emotionally Abused:      Physically Abused:      Sexually Abused:            Allergies:   Patient has no known allergies.      [facility]INDSA[facility] - Heart Clinic  Pager: [phone]      Thank you for allowing me to participate in the care of your patient.      Sincerely,     LINDSAY [facility]  Heart Care  cc:   Eulalia Miner PA-C  [address]  [place]  [place] [address]

## 2021-09-14 NOTE — PATIENT INSTRUCTIONS
Today's Plan:   - Schedule a visit with Dr. Poe before the end of the year. I will let you know if he wants you to wear a heart monitor beforehand to check for break-through atrial fibrillation.  - Sleep study when able.   - Return to see Dr. Soni in 1 year.     If you have questions or concerns please call my nurse team at [phone].  Scheduling phone number: [phone]  For after hours urgent concerns call [phone] option 2.   Reminder: Please bring in all current medications, over the counter supplements and vitamin bottles to your next appointment.    It was a pleasure seeing you today!     Eulalia Miner PA-C

## 2021-10-24 ENCOUNTER — HEALTH MAINTENANCE LETTER (OUTPATIENT)
Age: 66
End: 2021-10-24

## 2021-10-28 ENCOUNTER — TELEPHONE (OUTPATIENT)
Dept: CARDIOLOGY | Facility: CLINIC | Age: 66
End: 2021-10-28

## 2021-10-28 NOTE — TELEPHONE ENCOUNTER
Prior Authorization Retail Medication Request    Medication/Dose: Fenofibric Acid 135mg  ICD code (if different than what is on RX):  Hyperlipidemia, elevated trigs  Previously Tried and Failed:  zocor 40mg  Rationale: tig  Numbers are  Now below 500-continue with current medications    Insurance Name:[facility]  Insurance ID:  [id number]      Pharmacy Information (if different than what is on RX)  Name:  [facility]  Phone:  [phone]

## 2021-10-29 NOTE — TELEPHONE ENCOUNTER
Prior Authorization Approval    Authorization Effective Date: 7/31/2021  Authorization Expiration Date: 10/29/2022  Medication: Choline Fenofibrate (FENOFIBRIC ACID) 135 MG CPDR--APPROVED  Approved Dose/Quantity:   Reference #:     Insurance Company: [employer] Part D - Phone [phone] Fax [phone]  Expected CoPay:       CoPay Card Available:      Foundation Assistance Needed:    Which Pharmacy is filling the prescription (Not needed for infusion/clinic administered): [id number] - [facility] [address] [facility]  Pharmacy Notified: Yes  Patient Notified: Yes **Instructed pharmacy to notify patient when script is ready to /ship.**

## 2021-10-29 NOTE — TELEPHONE ENCOUNTER
PA Initiation    Medication: Choline Fenofibrate (FENOFIBRIC ACID) 135 MG CPDR   Insurance Company: [employer] Part D - Phone [phone] Fax [phone]  Pharmacy Filling the Rx: [id number] [address] [facility]  Filling Pharmacy Phone: [phone]  Filling Pharmacy Fax: [phone]  Start Date: 10/29/2021

## 2021-11-05 ENCOUNTER — TELEPHONE (OUTPATIENT)
Dept: INTERNAL MEDICINE | Facility: CLINIC | Age: 66
End: 2021-11-05
Payer: COMMERCIAL

## 2021-11-05 DIAGNOSIS — E78.1 HYPERTRIGLYCERIDEMIA: Primary | ICD-10-CM

## 2021-11-05 RX ORDER — FENOFIBRATE 145 MG/1
145 TABLET, COATED ORAL DAILY
Qty: 90 TABLET | Refills: 3 | Status: SHIPPED | OUTPATIENT
Start: 2021-11-05 | End: 2021-11-11

## 2021-11-05 NOTE — TELEPHONE ENCOUNTER
Dr. Laughlin    Fenofibric Acid Capsules- 300$ is script. Per Pharmacy the tablets will be cheaper, can you send a script for tablets to pharmacy.     Zenon Shahhumberto Sutton only needs a call back if there's any issues-  Otherwise call back not needed.   Can we leave a detailed message on this number? YES  Phone number patient can be reached at: Cell number on file:    Telephone Information:   Mobile [phone]       Frances Reddy RN  [facility] Triage

## 2021-11-11 RX ORDER — FENOFIBRATE 145 MG/1
145 TABLET, COATED ORAL DAILY
Qty: 90 TABLET | Refills: 3 | Status: SHIPPED | OUTPATIENT
Start: 2021-11-11 | End: 2023-02-13

## 2021-11-11 NOTE — TELEPHONE ENCOUNTER
Patient's mother called saying Rx Fenofibrate from last week was sent to wrong pharmacy.     Patient's preferred pharmacy information updated.   Rx Fenofibrate 145 mg tablets E-Rx'd to patient's [facility] pharmacy.    Svetlana ARCOS, RN      November 11, 2021  12:41 PM

## 2021-12-25 ENCOUNTER — OFFICE VISIT (OUTPATIENT)
Dept: URGENT CARE | Facility: URGENT CARE | Age: 66
End: 2021-12-25
Payer: MEDICARE

## 2021-12-25 VITALS
RESPIRATION RATE: 16 BRPM | OXYGEN SATURATION: 97 % | TEMPERATURE: 97 F | SYSTOLIC BLOOD PRESSURE: 142 MMHG | HEART RATE: 96 BPM | WEIGHT: 254 LBS | BODY MASS INDEX: 36.45 KG/M2 | DIASTOLIC BLOOD PRESSURE: 89 MMHG

## 2021-12-25 DIAGNOSIS — I10 BENIGN ESSENTIAL HYPERTENSION: ICD-10-CM

## 2021-12-25 DIAGNOSIS — R07.89 BURNING CHEST PAIN: Primary | ICD-10-CM

## 2021-12-25 DIAGNOSIS — I47.19 ATRIAL TACHYCARDIA (H): ICD-10-CM

## 2021-12-25 DIAGNOSIS — R12 HEARTBURN: ICD-10-CM

## 2021-12-25 PROCEDURE — 99214 OFFICE O/P EST MOD 30 MIN: CPT | Performed by: PHYSICIAN ASSISTANT

## 2021-12-25 PROCEDURE — 93000 ELECTROCARDIOGRAM COMPLETE: CPT | Performed by: PHYSICIAN ASSISTANT

## 2021-12-25 NOTE — PROGRESS NOTES
ASSESSMENT/PLAN:     (R07.89) Burning chest pain  (primary encounter diagnosis)  MDM: Intermittent, non-exertional burning chest pain episodes, lasting 3 hours per episode, one time weekly, x 1 month in a 66 year old male with known GERD history and multiple cardiac risk factors. He reports symptoms are similar to prior heartburn episodes and seem to be resolved by H2 blockers. Due to his cardiac risk factors, and past cardiac history, ECG is done here today for screening. No acute ischemic changes  Or arrhythmia. Plan is as per below.       Plan: Below discharge summary is reviewed with patient verbally and provided in printed form for home review today.     December 25, 2021 [facility] Plan:     1. Increase your Prilosec (Omeprazole) dose to 40 mg once daily, for the next 2 weeks.     2. Follow-up with your primary care provider for a recheck in 2 weeks to see if you should decrease your dose, to see if you need to be referred for an EGD (scope into food tube and stomach) or to see if you need other evaluation or treatment.     3. Follow-up with primary care provider if the change in your dose of Omeprazole does not resolve your symptoms over the next 2-3 days and immediately if your symptoms worsen.     4. Go to the ER if you develop any of the below:         Stomach pain gets worse or moves to the lower right abdomen (appendix area)    Chest pain appears or gets worse, or spreads to the back, neck, shoulder, or arm    Unexplained shortness of breath with exertion (like your typical 3 mile walk, walking stairs or similar)    Trouble or pain swallowing    Frequent vomiting (can t keep down liquids)    Blood in the stool or vomit (red or black in color)    Feeling weak or dizzy    Fever of 100.4 F (38 C) or higher, or as directed by your healthcare provider          (R12) Heartburn  Plan: EKG 12-lead complete w/read - Clinics      (I10) Benign essential hypertension  Plan: EKG 12-lead complete  "w/read - Clinics      (I47.1) Atrial tachycardia (H)  Plan: EKG 12-lead complete w/read - Clinics      ----------------------------------------------------------------------      SUBJECTIVE:      Martin Flaherty is a very pleasant 66 year old male, with a past medical history that includes GERD, HTN, hypertriglyceridemia, A-Fib, CKD (please see below for full past medical history), who presents to urgent care today for evaluation of intermittent \"really bad heartburn\".     HPI: Over the past month, patient reports he has had approximately one episode weekly, that lasts an estimated 3 hours, of mid-sternal burning pain (tells me this is similar to past heartburn episodes).  Symptoms have NOT come on during or after exertion. He admits that chocolate may have provoked at least one episode. He is unsure what may have provoked other episodes. He has found Tums and  Pepcid AC seem to resolve episodes. Patient notes onset of above occurred when he was switched from Omeprazole 20 mg capsules to Omeprazole 20 mg tabs. He wonders if that could be a factor.     Patient denies any chest pain or unexplained exertional shortness of breath with exertional activity such as his daily 3 mile walk. Patient denies any associated nausea, diaphoresis, abdominal pain, racing or irregular heartbeat, syncope or near syncope when he is experiencing his mid-sternal burning chest pain.     Of note, patient states he has a cardiology follow-up appointment on 1/6/22        Aggravating Factors: Possibly eating chocolate.     Relieving Factors: Tums and Pepcid AC OTC      Review Of Systems  Consitutional: No acute onset fever or chills   Skin: No acute rash or hives  Ears/Nose/Throat:  No acute onset nasal congestion or ear pain   Respiratory: No acute onset cough. No shortness of breath, dyspnea on exertion, cough, or hemoptysis  Cardiovascular: Positive for intermittent mid-sternal chest pain that patient relates to heartburn as per HPI. " Patient has a past history of A-Fib, but denies any palpitations, tachycardia, irregular heart beat, dyspnea on exertion, orthopnea, lower extremity edema, syncope or near-syncope. Patient states he walks 3 miles daily for exercise and states he has NOT noticed and decrease or change in his exercise tolerance over the past month since onset of his above symptoms.   Gastrointestinal: Positive as per HPI. No acute onset abdominal pain, nausea, vomiting or diarrhea.   Neurologic: No acute onset headaches, neck stiffness or lethargy.   Hematologic/Lymphatic/Immunologic: No personal hx of DVT/PE or blood dyscrasia        Past Medical History:   Diagnosis Date     Atrial tachycardia (H)      Benign essential hypertension      Chronic gout      History of atrial fibrillation     remote; not on AC     Hypertriglyceridemia      Obesity (BMI 30-39.9)      Pure hypercholesterolemia           Past Surgical History:   Procedure Laterality Date     APPENDECTOMY            EP STUDY, MODIFIED  2/2011    Attempted ablation 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       Social History     Socioeconomic History     Marital status:      Spouse name: Lesley     Number of children: 3     Years of education: 14     Highest education level: Not on file   Occupational History     Occupation: Retired - product support     Employer: [employer]   Tobacco Use     Smoking status: Never Smoker     Smokeless tobacco: Never Used   Substance and Sexual Activity     Alcohol use: Yes     Comment: occ     Drug use: No     Sexual activity: Not on file   Other Topics Concern      Service Not Asked     Blood Transfusions Not Asked     Caffeine Concern No     Occupational Exposure Not Asked     Hobby Hazards Not Asked     Sleep Concern No     Stress Concern No     Weight Concern No     Special Diet No     Back Care Not Asked     Exercise Yes     Comment: walks daily     Bike Helmet Not Asked     Seat Belt Not Asked     Self-Exams Not  Asked     Parent/sibling w/ CABG, MI or angioplasty before 65F 55M? Not Asked   Social History Narrative    .    3 adult children.    No grand children.    Walks dog twice a day x 1 hour each time.      Social Determinants of Health     Financial Resource Strain: Not on file   Food Insecurity: Not on file   Transportation Needs: Not on file   Physical Activity: Not on file   Stress: Not on file   Social Connections: Not on file   Intimate Partner Violence: Not on file   Housing Stability: Not on file       Family History   Problem Relation Age of Onset     Cancer Father 48        metastatic at presentation; unknown primary     Myocardial Infarction Mother         later in life     Hyperlipidemia Brother         multiple brothers     Diabetes No family hx of      Cerebrovascular Disease No family hx of      Coronary Artery Disease Early Onset No family hx of      Colon Cancer No family hx of      Prostate Cancer No family hx of           Current Outpatient Medications   Medication     allopurinol (ZYLOPRIM) 300 MG tablet     amLODIPine (NORVASC) 10 MG tablet     fenofibrate (TRICOR) 145 MG tablet     flecainide (TAMBOCOR) 50 MG tablet     indomethacin (INDOCIN) 50 MG capsule     lisinopril (ZESTRIL) 2.5 MG tablet     omeprazole (PRILOSEC) 20 MG DR capsule     simvastatin (ZOCOR) 40 MG tablet     No current facility-administered medications for this visit.       No Known Allergies    OBJECTIVE:  BP (!) 142/89   Pulse 96   Temp 97  F (36.1  C)   Resp 16   Wt 115.2 kg (254 lb)   SpO2 97%   BMI 36.45 kg/m      General appearance: alert and no apparent distress  Skin color is pink and without rash.  HEENT:   Conjunctiva not injected.  Sclera clear.  Oropharyngeal exam is normal: no lesions, erythema, adenopathy or exudate.  NECK: Trachea is midline. No JVD. Neck is supple, FROM with no adenopathy  CARDIAC:NORMAL - regular rate and rhythm without murmur.  RESP: No reproducible pain on palpation of chest wall  today. Normal - CTA without rales, rhonchi, or wheezing.Good breath sounds into all listening areas today   ABDOMEN: Abdomen soft, non-tender. BS normal. No masses, organomegaly  EXTREMITIES:  No asymmetry. Lower legs are equally symmetrical bilaterally. No redness, swelling, heat or pain of lower extremities.   NEURO: Alert and oriented.  Normal speech and mentation.  CN II/XII grossly intact.  Gait within normal limits.          Due to patient's history of A-Fib and multiple cardiac risk factors, ECG was obtained to screen for arrhythmia and ischemic changes. Today's ECG by my read: NSR at 73 BPM. No ectopy. No evidence of acute ischemic changes.

## 2021-12-25 NOTE — PATIENT INSTRUCTIONS
Patient Education     GERD (Adult)    The esophagus is a tube that carries food from the mouth to the stomach. A valve (the LES, lower esophageal sphincter) at the lower end of the esophagus prevents stomach acid from flowing upward. When this valve doesn't work properly, stomach contents may repeatedly flow back up (reflux) into the esophagus. This is called gastroesophageal reflux disease (GERD). GERD can irritate the esophagus. It can cause problems with pain, swallowing or breathing. In severe cases, GERD can cause recurrent pneumonia (from aspiration or breathing in particles) or other serious problems.  Symptoms of reflux include burning, pressure or sharp pain in the upper abdomen or mid to lower chest. The pain can spread to the neck, back, or shoulder. There may be belching, an acid taste in the back of the throat, chronic cough, or sore throat, or hoarseness. GERD symptoms often occur during the day after a big meal. They can also occur at night when lying down.   Home care  Lifestyle changes can help reduce symptoms. If needed, your healthcare provider may prescribe medicines. Symptoms often improve with treatment, but if treatment is stopped, the symptoms often return after a few months. So most persons with GERD will need to continue treatment or get treatment on and off.  Lifestyle changes    Limit or avoid fatty, fried, and spicy foods, as well as coffee, chocolate, mint, and foods with high acid content such as tomatoes and citrus fruit and juices (orange, grapefruit, lemon).    Don t eat large meals, especially at night. Frequent, smaller meals are best. Don't lie down right after eating. And don t eat anything 3 hours before going to bed.    Don't drink alcohol or smoke. As much as possible, stay away from second hand smoke.    If you are overweight, losing weight will reduce symptoms.     Don't wear tight clothing around your stomach area.    If your symptoms occur during sleep, use a foam wedge  "to elevate your upper body (not just your head.) Or, place 4\" blocks under the head of your bed. Or use 2 bed risers under your bedframe.  Medicines  If needed, medicines can help relieve the symptoms of GERD and prevent damage to the esophagus. Discuss a medicine plan with your healthcare provider. This may include one or more of the following medicines:    Antacids to help neutralize the normal acids in your stomach.    Acid blockers (Histamine or H2 blockers) to decrease acid production.    Acid inhibitors (proton pump inhibitors PPIs) to decrease acid production in a different way than the blockers. They may work better, but can take a little longer to take effect.  Take an antacid 30 to 60 minutes after eating and at bedtime, but not at the same time as an acid blocker.  Try not to take medicines such as ibuprofen and aspirin. If you are taking aspirin for your heart or other medical reasons, talk to your healthcare provider about stopping it.  Follow-up care  Follow up with your healthcare provider or as advised by our staff.  When to seek medical advice  Call your healthcare provider if any of the following occur:    Stomach pain gets worse or moves to the lower right abdomen (appendix area)    Chest pain appears or gets worse, or spreads to the back, neck, shoulder, or arm    An over-the-counter trial of medicine doesn't relieve your symptoms    Weight loss that can't be explained    Trouble or pain swallowing    Frequent vomiting (can t keep down liquids)    Blood in the stool or vomit (red or black in color)    Feeling weak or dizzy    Fever of 100.4 F (38 C) or higher, or as directed by your healthcare provider  Kayleigh last reviewed this educational content on 3/1/2018    4977-0723 [employer]. All rights reserved. This information is not intended as a substitute for professional medical care. Always follow your healthcare professional's instructions.           December 25, 2021 " [facility] Plan:     1. Increase your Prilosec (Omeprazole) dose to 40 mg once daily, for the next 2 weeks.     2. Follow-up with your primary care provider for a recheck in 2 weeks to see if you should decrease your dose, to see if you need to be referred for an EGD (scope into food tube and stomach) or to see if you need other evaluation or treatment.     3. Follow-up with primary care provider if the change in your dose of Omeprazole does not resolve your symptoms over the next 2-3 days and immediately if your symptoms worsen.     4. Go to the ER if you develop any of the below:         Stomach pain gets worse or moves to the lower right abdomen (appendix area)    Chest pain appears or gets worse, or spreads to the back, neck, shoulder, or arm    Unexplained shortness of breath with exertion (like your typical 3 mile walk, walking stairs or similar)    Trouble or pain swallowing    Frequent vomiting (can t keep down liquids)    Blood in the stool or vomit (red or black in color)    Feeling weak or dizzy    Fever of 100.4 F (38 C) or higher, or as directed by your healthcare provider

## 2021-12-30 ENCOUNTER — OFFICE VISIT (OUTPATIENT)
Dept: CARDIOLOGY | Facility: CLINIC | Age: 66
End: 2021-12-30
Attending: PHYSICIAN ASSISTANT
Payer: MEDICARE

## 2021-12-30 VITALS
WEIGHT: 245 LBS | BODY MASS INDEX: 35.07 KG/M2 | DIASTOLIC BLOOD PRESSURE: 80 MMHG | SYSTOLIC BLOOD PRESSURE: 122 MMHG | HEIGHT: 70 IN | OXYGEN SATURATION: 97 % | HEART RATE: 63 BPM

## 2021-12-30 DIAGNOSIS — I47.10 PAROXYSMAL SUPRAVENTRICULAR TACHYCARDIA (H): ICD-10-CM

## 2021-12-30 PROCEDURE — 99213 OFFICE O/P EST LOW 20 MIN: CPT | Performed by: INTERNAL MEDICINE

## 2021-12-30 RX ORDER — FLECAINIDE ACETATE 50 MG/1
75 TABLET ORAL 2 TIMES DAILY
Qty: 270 TABLET | Refills: 3 | Status: SHIPPED | OUTPATIENT
Start: 2021-12-30 | End: 2023-04-26

## 2021-12-30 ASSESSMENT — MIFFLIN-ST. JEOR: SCORE: 1902.56

## 2021-12-30 NOTE — LETTER
12/30/2021    Stephania Laughlin MD  [address] [facility] [address]    RE: Martin Flaherty     Dear Colleague,     I had the pleasure of seeing Martin Flaherty in the [facility].  HPI and Plan:   Service Date: 12/30/2021    HISTORY OF PRESENT ILLNESS:  I saw Mr. Flaherty for followup of atrial tachycardia.  He is a 66-year-old white male who was [place] to [place].  Since he came to our care in 2011, there has been no evidence of atrial fibrillation.  The patient was diagnosed to have SVT and the EP study showed an atrial tachycardia that was close to the AV node.  For that reason, he has been treated medically instead of having ablation.  He is on flecainide 75 mg p.o. b.i.d.  So far, there has been no recurrence of atrial tachycardia.  He has no apparent side effects from flecainide.  Overall, he has no shortness of breath or chest pain.  He has occasional heartburn in the last few weeks that is not exertion related.  He has been scheduled for upper GI endoscopy.    PHYSICAL EXAMINATION:    VITAL SIGNS:  Blood pressure was 122/80, heart rate 63 beats per minute, body weight 245 pounds.  HEENT:  The eyes and ENT were unremarkable.  LUNGS:  Clear.  CARDIAC:  Rhythm was regular.  Heart sounds were normal, without murmur.  ABDOMEN:  Severe obesity.  EXTREMITIES:  There was no pedal edema.    EKG showed normal sinus rhythm a few days ago.    ASSESSMENT AND RECOMMENDATIONS:  Mr. Flaherty is doing well from an arrhythmia point of view.  There is no evidence of recurrent atrial fibrillation or tachycardia.  I do not recommend anticoagulation.  He will continue the current dose of flecainide and return for followup in 1 year.    Orders Placed This Encounter   Procedures     Follow-Up with Electrophysiologist     EKG 12-lead complete w/read (Future)- to be scheduled     Orders Placed This Encounter   Medications     flecainide (TAMBOCOR) 50 MG tablet      Sig: Take 1.5 tablets (75 mg) by mouth 2 times daily     Dispense:  270 tablet     Refill:  3       Medications Discontinued During This Encounter   Medication Reason     indomethacin (INDOCIN) 50 MG capsule      flecainide (TAMBOCOR) 50 MG tablet Reorder       Encounter Diagnosis   Name Primary?     Paroxysmal supraventricular tachycardia (H)        CURRENT MEDICATIONS:  Current Outpatient Medications   Medication Sig Dispense Refill     allopurinol (ZYLOPRIM) 300 MG tablet Take 1 tablet (300 mg) by mouth daily 90 tablet 3     amLODIPine (NORVASC) 10 MG tablet Take 1 tablet (10 mg) by mouth daily 90 tablet 3     fenofibrate (TRICOR) 145 MG tablet Take 1 tablet (145 mg) by mouth daily 90 tablet 3     flecainide (TAMBOCOR) 50 MG tablet Take 1.5 tablets (75 mg) by mouth 2 times daily 270 tablet 3     lisinopril (ZESTRIL) 2.5 MG tablet Take 1 tablet (2.5 mg) by mouth daily Please call for an appointment. [phone] 90 tablet 3     omeprazole (PRILOSEC) 20 MG DR capsule Take 1 capsule (20 mg) by mouth daily 90 capsule 3     simvastatin (ZOCOR) 40 MG tablet Take 1 tablet (40 mg) by mouth At Bedtime 90 tablet 1       ALLERGIES   No Known Allergies    PAST MEDICAL HISTORY:  Past Medical History:   Diagnosis Date     Atrial tachycardia (H)      Benign essential hypertension      Chronic gout      History of atrial fibrillation     remote; not on AC     Hypertriglyceridemia      Obesity (BMI 30-39.9)      Pure hypercholesterolemia          PAST SURGICAL HISTORY:  Past Surgical History:   Procedure Laterality Date     APPENDECTOMY            EP STUDY, MODIFIED  2/2011    Attempted ablation 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       FAMILY HISTORY:  Family History   Problem Relation Age of Onset     Cancer Father 48        metastatic at presentation; unknown primary     Myocardial Infarction Mother         later in life     Hyperlipidemia Brother         multiple brothers     Diabetes No family hx of       Cerebrovascular Disease No family hx of      Coronary Artery Disease Early Onset No family hx of      Colon Cancer No family hx of      Prostate Cancer No family hx of        SOCIAL HISTORY:  Social History     Socioeconomic History     Marital status:      Spouse name: Lesley     Number of children: 3     Years of education: 14     Highest education level: Not on file   Occupational History     Occupation: Retired - product support     Employer: [employer]   Tobacco Use     Smoking status: Never Smoker     Smokeless tobacco: Never Used   Substance and Sexual Activity     Alcohol use: Yes     Comment: occ     Drug use: No     Sexual activity: Not on file   Other Topics Concern      Service Not Asked     Blood Transfusions Not Asked     Caffeine Concern No     Occupational Exposure Not Asked     Hobby Hazards Not Asked     Sleep Concern No     Stress Concern No     Weight Concern No     Special Diet No     Back Care Not Asked     Exercise Yes     Comment: walks daily     Bike Helmet Not Asked     Seat Belt Not Asked     Self-Exams Not Asked     Parent/sibling w/ CABG, MI or angioplasty before 65F 55M? Not Asked   Social History Narrative    .    3 adult children.    No grand children.    Walks dog twice a day x 1 hour each time.      Social Determinants of Health     Financial Resource Strain: Not on file   Food Insecurity: Not on file   Transportation Needs: Not on file   Physical Activity: Not on file   Stress: Not on file   Social Connections: Not on file   Intimate Partner Violence: Not on file   Housing Stability: Not on file       Review of Systems:  Skin:  Negative       Eyes:  Positive for glasses reading and driving  ENT:  Negative      Respiratory:  Negative       Cardiovascular:  Negative      Gastroenterology: Positive for heartburn treated  Genitourinary:  Negative nocturia    Musculoskeletal:  Negative      Neurologic:  Negative      Psychiatric:  Negative      Heme/Lymph/Imm:   "Negative      Endocrine:  Negative        Physical Exam:  Vitals: /80   Pulse 63   Ht 1.778 m (5' 10\")   Wt 111.1 kg (245 lb)   SpO2 97%   BMI 35.15 kg/m      Constitutional:  cooperative, alert and oriented, well developed, well nourished, in no acute distress        Skin:  warm and dry to the touch, no apparent skin lesions or masses noted          Head:  normocephalic, no masses or lesions        Eyes:           Lymph:      ENT:  no pallor or cyanosis, dentition good        Neck:           Respiratory:  normal breath sounds, clear to auscultation, normal A-P diameter, normal symmetry, normal respiratory excursion, no use of accessory muscles         Cardiac: regular rhythm, normal S1/S2, no S3 or S4, apical impulse not displaced, no murmurs, gallops or rubs                pulses full and equal, no bruits auscultated                                        GI:  abdomen soft, non-tender, BS normoactive, no mass, no HSM, no bruits        Extremities and Muscular Skeletal:  no deformities, clubbing, cyanosis, erythema observed              Neurological:           Psych:           Kofi Poe MD    cc:  Stephania Laughlin MD  [address], [address]    cc:   Eulalia Miner PA-C  [address] [place] [id number]    D: 2021   T: 2021   MT: viviana  Name:     NADINE GARCIA  MRN:      [id number]        Account:      [id number]   :      1955           Service Date: 2021   Document: [id number]      "

## 2021-12-30 NOTE — PROGRESS NOTES
HPI and Plan:   See dictation        Orders Placed This Encounter   Procedures     Follow-Up with Electrophysiologist     EKG 12-lead complete w/read (Future)- to be scheduled       Orders Placed This Encounter   Medications     flecainide (TAMBOCOR) 50 MG tablet     Sig: Take 1.5 tablets (75 mg) by mouth 2 times daily     Dispense:  270 tablet     Refill:  3       Medications Discontinued During This Encounter   Medication Reason     indomethacin (INDOCIN) 50 MG capsule      flecainide (TAMBOCOR) 50 MG tablet Reorder         Encounter Diagnosis   Name Primary?     Paroxysmal supraventricular tachycardia (H)        CURRENT MEDICATIONS:  Current Outpatient Medications   Medication Sig Dispense Refill     allopurinol (ZYLOPRIM) 300 MG tablet Take 1 tablet (300 mg) by mouth daily 90 tablet 3     amLODIPine (NORVASC) 10 MG tablet Take 1 tablet (10 mg) by mouth daily 90 tablet 3     fenofibrate (TRICOR) 145 MG tablet Take 1 tablet (145 mg) by mouth daily 90 tablet 3     flecainide (TAMBOCOR) 50 MG tablet Take 1.5 tablets (75 mg) by mouth 2 times daily 270 tablet 3     lisinopril (ZESTRIL) 2.5 MG tablet Take 1 tablet (2.5 mg) by mouth daily Please call for an appointment. [phone] 90 tablet 3     omeprazole (PRILOSEC) 20 MG DR capsule Take 1 capsule (20 mg) by mouth daily 90 capsule 3     simvastatin (ZOCOR) 40 MG tablet Take 1 tablet (40 mg) by mouth At Bedtime 90 tablet 1       ALLERGIES   No Known Allergies    PAST MEDICAL HISTORY:  Past Medical History:   Diagnosis Date     Atrial tachycardia (H)      Benign essential hypertension      Chronic gout      History of atrial fibrillation     remote; not on AC     Hypertriglyceridemia      Obesity (BMI 30-39.9)      Pure hypercholesterolemia        PAST SURGICAL HISTORY:  Past Surgical History:   Procedure Laterality Date     APPENDECTOMY            EP STUDY, MODIFIED  2/2011    Attempted ablation 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       FAMILY  HISTORY:  Family History   Problem Relation Age of Onset     Cancer Father 48        metastatic at presentation; unknown primary     Myocardial Infarction Mother         later in life     Hyperlipidemia Brother         multiple brothers     Diabetes No family hx of      Cerebrovascular Disease No family hx of      Coronary Artery Disease Early Onset No family hx of      Colon Cancer No family hx of      Prostate Cancer No family hx of        SOCIAL HISTORY:  Social History     Socioeconomic History     Marital status:      Spouse name: Lesley     Number of children: 3     Years of education: 14     Highest education level: Not on file   Occupational History     Occupation: Retired - product support     Employer: [employer]   Tobacco Use     Smoking status: Never Smoker     Smokeless tobacco: Never Used   Substance and Sexual Activity     Alcohol use: Yes     Comment: occ     Drug use: No     Sexual activity: Not on file   Other Topics Concern      Service Not Asked     Blood Transfusions Not Asked     Caffeine Concern No     Occupational Exposure Not Asked     Hobby Hazards Not Asked     Sleep Concern No     Stress Concern No     Weight Concern No     Special Diet No     Back Care Not Asked     Exercise Yes     Comment: walks daily     Bike Helmet Not Asked     Seat Belt Not Asked     Self-Exams Not Asked     Parent/sibling w/ CABG, MI or angioplasty before 65F 55M? Not Asked   Social History Narrative    .    3 adult children.    No grand children.    Walks dog twice a day x 1 hour each time.      Social Determinants of Health     Financial Resource Strain: Not on file   Food Insecurity: Not on file   Transportation Needs: Not on file   Physical Activity: Not on file   Stress: Not on file   Social Connections: Not on file   Intimate Partner Violence: Not on file   Housing Stability: Not on file       Review of Systems:  Skin:  Negative       Eyes:  Positive for glasses reading and driving  ENT:  " Negative      Respiratory:  Negative       Cardiovascular:  Negative      Gastroenterology: Positive for heartburn treated  Genitourinary:  Negative nocturia    Musculoskeletal:  Negative      Neurologic:  Negative      Psychiatric:  Negative      Heme/Lymph/Imm:  Negative      Endocrine:  Negative        Physical Exam:  Vitals: /80   Pulse 63   Ht 1.778 m (5' 10\")   Wt 111.1 kg (245 lb)   SpO2 97%   BMI 35.15 kg/m      Constitutional:  cooperative, alert and oriented, well developed, well nourished, in no acute distress        Skin:  warm and dry to the touch, no apparent skin lesions or masses noted          Head:  normocephalic, no masses or lesions        Eyes:           Lymph:      ENT:  no pallor or cyanosis, dentition good        Neck:           Respiratory:  normal breath sounds, clear to auscultation, normal A-P diameter, normal symmetry, normal respiratory excursion, no use of accessory muscles         Cardiac: regular rhythm, normal S1/S2, no S3 or S4, apical impulse not displaced, no murmurs, gallops or rubs                pulses full and equal, no bruits auscultated                                        GI:  abdomen soft, non-tender, BS normoactive, no mass, no HSM, no bruits        Extremities and Muscular Skeletal:  no deformities, clubbing, cyanosis, erythema observed              Neurological:           Psych:           CC  Eulalia Miner, LINDSAY  [address]  [place] [address]              "

## 2021-12-31 NOTE — PROGRESS NOTES
Service Date: 2021    HISTORY OF PRESENT ILLNESS:  I saw Mr. Flaherty for followup of atrial tachycardia.  He is a 66-year-old white male who was [place].  Since he came to our care in , there has been no evidence of atrial fibrillation.  The patient was diagnosed to have SVT and the EP study showed an atrial tachycardia that was close to the AV node.  For that reason, he has been treated medically instead of having ablation.  He is on flecainide 75 mg p.o. b.i.d.  So far, there has been no recurrence of atrial tachycardia.  He has no apparent side effects from flecainide.  Overall, he has no shortness of breath or chest pain.  He has occasional heartburn in the last few weeks that is not exertion related.  He has been scheduled for upper GI endoscopy.    PHYSICAL EXAMINATION:    VITAL SIGNS:  Blood pressure was 122/80, heart rate 63 beats per minute, body weight 245 pounds.  HEENT:  The eyes and ENT were unremarkable.  LUNGS:  Clear.  CARDIAC:  Rhythm was regular.  Heart sounds were normal, without murmur.  ABDOMEN:  Severe obesity.  EXTREMITIES:  There was no pedal edema.    EKG showed normal sinus rhythm a few days ago.    ASSESSMENT AND RECOMMENDATIONS:  Mr. Flaherty is doing well from an arrhythmia point of view.  There is no evidence of recurrent atrial fibrillation or tachycardia.  I do not recommend anticoagulation.  He will continue the current dose of flecainide and return for followup in 1 year.    Kofi Poe MD      cc:  Stephania Laughlin MD  [address], [address]    Kofi Poe MD        D: 2021   T: 2021   MT: viviana    Name:     NADINE FLAHERTY  MRN:      [id number]        Account:      [id number]   :      1955           Service Date: 2021       Document: [id number]

## 2022-01-28 DIAGNOSIS — K21.9 GASTROESOPHAGEAL REFLUX DISEASE WITHOUT ESOPHAGITIS: ICD-10-CM

## 2022-01-28 DIAGNOSIS — M1A.0720 IDIOPATHIC CHRONIC GOUT OF LEFT FOOT WITHOUT TOPHUS: ICD-10-CM

## 2022-01-28 RX ORDER — ALLOPURINOL 300 MG/1
TABLET ORAL
Qty: 90 TABLET | Refills: 3 | OUTPATIENT
Start: 2022-01-28

## 2022-01-31 RX ORDER — ALLOPURINOL 300 MG/1
300 TABLET ORAL DAILY
Qty: 90 TABLET | Refills: 0 | Status: SHIPPED | OUTPATIENT
Start: 2022-01-31 | End: 2022-07-07

## 2022-01-31 NOTE — TELEPHONE ENCOUNTER
He is overdue for his annual exam. Please ask him to schedule this appointment ASAP. Can refill medication temporarily if he anticipates running out prior to scheduled appointment.     Thank you.       
Patient called and is scheduled for physical 4/25, will come fasting    Is needing refill of allopurinol and omeprazole, this was increased to 40 mg at UC visit. Pended for 40mg    
Routing refill request to provider for review/approval because:   CBC on file in past 12 months    ALT on file in past 12 months    Has Uric Acid on file in past 12 months and value is less than 6    Normal serum creatinine on file in the past 12 months     All labs pended in the chart.     Nohemy Vázquez RN      
Spine appears normal, range of motion is not limited, no muscle or joint tenderness

## 2022-02-07 DIAGNOSIS — E78.5 HYPERLIPIDEMIA LDL GOAL <130: ICD-10-CM

## 2022-02-07 RX ORDER — SIMVASTATIN 40 MG
40 TABLET ORAL AT BEDTIME
Qty: 90 TABLET | Refills: 3 | Status: SHIPPED | OUTPATIENT
Start: 2022-02-07 | End: 2023-02-27

## 2022-02-07 NOTE — TELEPHONE ENCOUNTER
[facility] Refill Guideline reviewed.  Medication meets criteria for refill. Refill for zocor 40mg sent to [facility] pharmacy in [place].

## 2022-02-13 ENCOUNTER — HEALTH MAINTENANCE LETTER (OUTPATIENT)
Age: 67
End: 2022-02-13

## 2022-02-15 DIAGNOSIS — I47.10 PAROXYSMAL SUPRAVENTRICULAR TACHYCARDIA (H): ICD-10-CM

## 2022-02-15 RX ORDER — AMLODIPINE BESYLATE 10 MG/1
10 TABLET ORAL DAILY
Qty: 90 TABLET | Refills: 3 | Status: SHIPPED | OUTPATIENT
Start: 2022-02-15 | End: 2023-06-22

## 2022-02-17 PROCEDURE — 88305 TISSUE EXAM BY PATHOLOGIST: CPT | Mod: TC,ORL | Performed by: INTERNAL MEDICINE

## 2022-02-17 PROCEDURE — 88305 TISSUE EXAM BY PATHOLOGIST: CPT | Mod: 26 | Performed by: PATHOLOGY

## 2022-02-17 PROCEDURE — 88342 IMHCHEM/IMCYTCHM 1ST ANTB: CPT | Mod: 26 | Performed by: PATHOLOGY

## 2022-02-18 ENCOUNTER — LAB REQUISITION (OUTPATIENT)
Dept: LAB | Facility: CLINIC | Age: 67
End: 2022-02-18
Payer: MEDICARE

## 2022-02-18 DIAGNOSIS — K31.7 POLYP OF STOMACH AND DUODENUM: ICD-10-CM

## 2022-02-18 DIAGNOSIS — R10.13 EPIGASTRIC PAIN: ICD-10-CM

## 2022-02-18 DIAGNOSIS — R12 HEARTBURN: ICD-10-CM

## 2022-02-18 DIAGNOSIS — D17.5 BENIGN LIPOMATOUS NEOPLASM OF INTRA-ABDOMINAL ORGANS: ICD-10-CM

## 2022-02-23 LAB
PATH REPORT.ADDENDUM SPEC: NORMAL
PATH REPORT.COMMENTS IMP SPEC: NORMAL
PATH REPORT.COMMENTS IMP SPEC: NORMAL
PATH REPORT.FINAL DX SPEC: NORMAL
PATH REPORT.GROSS SPEC: NORMAL
PATH REPORT.MICROSCOPIC SPEC OTHER STN: NORMAL
PATH REPORT.RELEVANT HX SPEC: NORMAL
PHOTO IMAGE: NORMAL

## 2022-04-24 PROBLEM — N18.30 CHRONIC KIDNEY DISEASE, STAGE 3 (H): Status: RESOLVED | Noted: 2021-09-14 | Resolved: 2022-04-24

## 2022-04-24 PROBLEM — E66.01 SEVERE OBESITY (BMI 35.0-35.9 WITH COMORBIDITY) (H): Status: RESOLVED | Noted: 2020-12-29 | Resolved: 2022-04-24

## 2022-04-25 ENCOUNTER — OFFICE VISIT (OUTPATIENT)
Dept: INTERNAL MEDICINE | Facility: CLINIC | Age: 67
End: 2022-04-25
Payer: MEDICARE

## 2022-04-25 VITALS
RESPIRATION RATE: 16 BRPM | HEIGHT: 70 IN | WEIGHT: 247.1 LBS | HEART RATE: 72 BPM | BODY MASS INDEX: 35.37 KG/M2 | SYSTOLIC BLOOD PRESSURE: 120 MMHG | OXYGEN SATURATION: 98 % | DIASTOLIC BLOOD PRESSURE: 82 MMHG | TEMPERATURE: 97.8 F

## 2022-04-25 DIAGNOSIS — E66.01 MORBID OBESITY (H): ICD-10-CM

## 2022-04-25 DIAGNOSIS — E78.00 PURE HYPERCHOLESTEROLEMIA: ICD-10-CM

## 2022-04-25 DIAGNOSIS — Z13.1 SCREENING FOR DIABETES MELLITUS: ICD-10-CM

## 2022-04-25 DIAGNOSIS — I47.19 ATRIAL TACHYCARDIA (H): ICD-10-CM

## 2022-04-25 DIAGNOSIS — Z00.00 WELCOME TO MEDICARE PREVENTIVE VISIT: Primary | ICD-10-CM

## 2022-04-25 DIAGNOSIS — E78.1 HYPERTRIGLYCERIDEMIA: ICD-10-CM

## 2022-04-25 DIAGNOSIS — K21.9 GASTROESOPHAGEAL REFLUX DISEASE WITHOUT ESOPHAGITIS: ICD-10-CM

## 2022-04-25 DIAGNOSIS — Z12.5 SCREENING FOR PROSTATE CANCER: ICD-10-CM

## 2022-04-25 LAB
ALBUMIN SERPL-MCNC: 4.2 G/DL (ref 3.4–5)
ALP SERPL-CCNC: 79 U/L (ref 40–150)
ALT SERPL W P-5'-P-CCNC: 39 U/L (ref 0–70)
ANION GAP SERPL CALCULATED.3IONS-SCNC: 3 MMOL/L (ref 3–14)
AST SERPL W P-5'-P-CCNC: 18 U/L (ref 0–45)
BILIRUB SERPL-MCNC: 0.5 MG/DL (ref 0.2–1.3)
BUN SERPL-MCNC: 16 MG/DL (ref 7–30)
CALCIUM SERPL-MCNC: 9 MG/DL (ref 8.5–10.1)
CHLORIDE BLD-SCNC: 107 MMOL/L (ref 94–109)
CHOLEST SERPL-MCNC: 161 MG/DL
CO2 SERPL-SCNC: 30 MMOL/L (ref 20–32)
CREAT SERPL-MCNC: 1.31 MG/DL (ref 0.66–1.25)
FASTING STATUS PATIENT QL REPORTED: YES
GFR SERPL CREATININE-BSD FRML MDRD: 60 ML/MIN/1.73M2
GLUCOSE BLD-MCNC: 112 MG/DL (ref 70–99)
HDLC SERPL-MCNC: 38 MG/DL
LDLC SERPL CALC-MCNC: 103 MG/DL
NONHDLC SERPL-MCNC: 123 MG/DL
POTASSIUM BLD-SCNC: 4.3 MMOL/L (ref 3.4–5.3)
PROT SERPL-MCNC: 7.6 G/DL (ref 6.8–8.8)
PSA SERPL-MCNC: 1.8 UG/L (ref 0–4)
SODIUM SERPL-SCNC: 140 MMOL/L (ref 133–144)
TRIGL SERPL-MCNC: 102 MG/DL

## 2022-04-25 PROCEDURE — 80053 COMPREHEN METABOLIC PANEL: CPT | Performed by: INTERNAL MEDICINE

## 2022-04-25 PROCEDURE — 36415 COLL VENOUS BLD VENIPUNCTURE: CPT | Performed by: INTERNAL MEDICINE

## 2022-04-25 PROCEDURE — 80061 LIPID PANEL: CPT | Performed by: INTERNAL MEDICINE

## 2022-04-25 PROCEDURE — G0402 INITIAL PREVENTIVE EXAM: HCPCS | Performed by: INTERNAL MEDICINE

## 2022-04-25 PROCEDURE — G0103 PSA SCREENING: HCPCS | Performed by: INTERNAL MEDICINE

## 2022-04-25 NOTE — PROGRESS NOTES
ASSESSMENT/PLAN                                                       (Z00.00) Welcome to Medicare preventive visit  (primary encounter diagnosis)  Comment: PMH, PSH, FH, SH, medications, allergies, immunizations, and preventative health measures reviewed and updated as appropriate.  Plan: see below for plans.      (E78.00) Pure hypercholesterolemia  (E78.1) Hypertriglyceridemia  (Z13.1) Screening for diabetes mellitus  (Z12.5) Screening for prostate cancer  Plan: fasting labs today.     (E66.01) Morbid obesity (H)  Comment: known issue that I take into account for their medical decisions, no current exacerbations or new concerns.    (I47.1) Atrial tachycardia (H)  Comment: stable on current regimen; followed by cardiology.    (K21.9) Gastroesophageal reflux disease without esophagitis  Comment: poorly controlled on omeprazole 40 mg daily, but may be diet related.  Plan: patient will make dietary modifications; if symptoms worsen, change, or do not improve, patient to contact MD.      Appropriate preventive services were discussed with this patient, including applicable screening as appropriate for cardiovascular disease, diabetes, osteopenia/osteoporosis, and glaucoma.  As appropriate for age/gender, discussed screening for colorectal cancer, prostate cancer, breast cancer, and cervical cancer. Checklist reviewing preventive services available has been given to the patient.    Reviewed patients plan of care. The Basic Care Plan (routine screening as documented in Health Maintenance) for Martin Flaherty meets the Care Plan requirement. This Care Plan has been established and reviewed with the Patient.    Stephania Laughlin MD   [address] [address]  T: [phone], F: [phone]    SUBJECTIVE                                                      Martin Flaherty is a very pleasant 66 year old male who presents for his Welcome to Medicare visit:    PMH, PSH, FH, SH,  medications, allergies, immunizations, preventative health, and health risk assessment reviewed.     Past Medical History:   Diagnosis Date     Atrial tachycardia (H)      Benign essential hypertension      Chronic gout      History of atrial fibrillation     remote; not on AC     Hypertriglyceridemia      Obesity (BMI 30-39.9)      Pure hypercholesterolemia      Past Surgical History:   Procedure Laterality Date     APPENDECTOMY       EP STUDY, MODIFIED  2/2011    Attempted ablation 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     Family History   Problem Relation Age of Onset     Cancer Father 48        metastatic at presentation; unknown primary     Myocardial Infarction Mother         later in life     Hyperlipidemia Brother         multiple brothers     Diabetes No family hx of      Cerebrovascular Disease No family hx of      Coronary Artery Disease Early Onset No family hx of      Colon Cancer No family hx of      Prostate Cancer No family hx of      Social History     Occupational History     Occupation: Retired - product support     Employer: [employer]   Tobacco Use     Smoking status: Never Smoker     Smokeless tobacco: Never Used   Vaping Use     Vaping Use: Never used   Substance and Sexual Activity     Alcohol use: Not Currently     Drug use: No   Social History Narrative    .    3 adult children.    No grand children (as of 2022)    Walks daily.     No Known Allergies     Current Outpatient Medications   Medication Sig     allopurinol (ZYLOPRIM) 300 MG tablet Take 1 tablet (300 mg) by mouth daily     amLODIPine (NORVASC) 10 MG tablet Take 1 tablet (10 mg) by mouth daily     fenofibrate (TRICOR) 145 MG tablet Take 1 tablet (145 mg) by mouth daily     flecainide (TAMBOCOR) 50 MG tablet Take 1.5 tablets (75 mg) by mouth 2 times daily     lisinopril (ZESTRIL) 2.5 MG tablet Take 1 tablet (2.5 mg) by mouth daily Please call for an appointment. [phone]     omeprazole (PRILOSEC) 20 MG DR capsule  Take 2 capsules (40 mg) by mouth daily     simvastatin (ZOCOR) 40 MG tablet Take 1 tablet (40 mg) by mouth At Bedtime     Immunization History   Administered Date(s) Administered     KIRK HALL,Chan 03/08/2021     COVID-19PF,Pfizer (12+ Yrs) 10/26/2021     Influenza (IIV3) PF 11/14/1997     Influenza Quad, Recombinant, pf(RIV4) (Flublok) 03/17/2020, 10/15/2020     Influenza Vaccine, 6+MO IM (QUADRIVALENT W/PRESERVATIVES) 10/21/2015, 10/19/2018     TD (ADULT, 7+) 07/07/1998, 12/02/2010     Tdap (Adacel,Boostrix) 12/29/2020     Zoster vaccine recombinant adjuvanted (SHINGRIX) 09/02/2020, 12/18/2020     Zoster vaccine, live 11/12/2016     Have you ever done Advance Care Planning? (For example, a Health Directive, POLST, or a discussion with a medical provider or your loved ones about your wishes): Yes, patient states has an Advance Care Planning document and will bring a copy to the clinic.    PREVENTATIVE HEALTH                                                      BMI: obese  Blood pressure: well-controlled on current regimen   Prostate CA Screening: DUE  Colon CA screening: up to date   Lung CA screening: n/a   AAA screening: n/a   Screening cholesterol: n/a - already being treated for this condition  Screening diabetes: DUE  Alcohol misuse screening: alcohol use reviewed - no intervention indicated at this time  Immunizations: reviewed; COVID-19 booster #2 and Pneumovax DUE - patient declines    HEALTH RISK ASSESSMENT                                                      In general, how would you rate your overall physical health? good  Outside of work, how many days during the week do you exercise? 7 days/week  Outside of work, approximately how many minutes a day do you exercise? greater than 60 minutes    If you drink alcohol do you typically have >3 drinks per day or >7 drinks per week? No  Do you usually eat at least 4 servings of fruit and vegetables a day, include whole grains & fiber and avoid regularly  "eating high fat or \"junk\" foods? No     Do you have any problems taking medications regularly? No  Do you have any side effects from medications? No    Assistance with daily activities: No    Safety concerns: No    Fall risk assessment: completed today (see ambulatory assessments)    Hearing concerns: No    In the past 6 months, have you been bothered by leaking of urine: No    In general, how would you rate your overall mental or emotional health: good    PHQ-2/PHQ-9 assessment: completed today (see ambulatory assessments)    Additional concerns today: No    OBJECTIVE                                                      /82   Pulse 72   Temp 97.8  F (36.6  C) (Tympanic)   Resp 16   Ht 1.778 m (5' 10\")   Wt 112.1 kg (247 lb 1.6 oz)   SpO2 98%   BMI 35.46 kg/m    Constitutional: well-appearing  Head, Ears, and Eyes: normocephalic; normal external auditory canal and pinna; tympanic membranes visualized and normal; normal lids and conjunctivae  Neck: supple, symmetric, no thyromegaly or lymphadenopathy  Respiratory: normal respiratory effort; clear to auscultation bilaterally  Cardiovascular: regular rate and rhythm; no edema  Gastrointestinal: soft, non-tender, and non-distended; no organomegaly or masses  Musculoskeletal: normal gait and station  Psych: normal judgment and insight; normal mood and affect; recent and remote memory intact    Visual Acuity:  Deferred - recent eye exam    ---    (Note was completed, in part, with [employer] voice-recognition software. Documentation was reviewed, but some grammatical, spelling, and word errors may remain.)    "

## 2022-04-27 NOTE — PROGRESS NOTES
Called patient and relayed result note messages to the patient.    In regards to switching from Zocor to Simvastatin or Rosuvastatin, patient states he would like to work on his diet first and then upon lab recheck if the cholesterol is still high patient states he would be open to switching medications.    Routing to PCP as DIANA Vázquez RN

## 2022-05-11 ENCOUNTER — TELEPHONE (OUTPATIENT)
Dept: INTERNAL MEDICINE | Facility: CLINIC | Age: 67
End: 2022-05-11
Payer: COMMERCIAL

## 2022-05-11 DIAGNOSIS — K21.9 GASTROESOPHAGEAL REFLUX DISEASE WITHOUT ESOPHAGITIS: ICD-10-CM

## 2022-05-11 RX ORDER — FAMOTIDINE 20 MG/1
20 TABLET, FILM COATED ORAL 2 TIMES DAILY PRN
Qty: 60 TABLET | Refills: 3 | Status: SHIPPED | OUTPATIENT
Start: 2022-05-11 | End: 2023-07-05

## 2022-05-11 RX ORDER — PANTOPRAZOLE SODIUM 40 MG/1
40 TABLET, DELAYED RELEASE ORAL DAILY
Qty: 90 TABLET | Refills: 3 | Status: SHIPPED | OUTPATIENT
Start: 2022-05-11 | End: 2023-07-05

## 2022-05-11 NOTE — TELEPHONE ENCOUNTER
REPLACE omeprazole with pantoprazole 40mg daily.    May also use Pepcid twice a day as needed for acute relief of symptoms.    Both prescriptions sent to pharmacy.

## 2022-05-11 NOTE — TELEPHONE ENCOUNTER
Patient called as he spoke to Dr. Laughlin about acid reflux and was directed to call if symptoms do not improve after taking the omeprazole 40mg daily so something stronger can be sent in    Patient states he is still having the acid reflux and would like something stronger sent to [facility] pharmacy on [address]    Patient leaves for [employer] tomorrow night so would like sent today if possible    Roni Lacey RN

## 2022-06-27 ENCOUNTER — NURSE TRIAGE (OUTPATIENT)
Dept: NURSING | Facility: CLINIC | Age: 67
End: 2022-06-27

## 2022-06-27 DIAGNOSIS — E66.9 OBESITY (BMI 30-39.9): Primary | ICD-10-CM

## 2022-06-27 NOTE — TELEPHONE ENCOUNTER
[facility] will call him to coordinate his appointment. If he doesn't hear from a representative within 2 business days, he should call [phone].

## 2022-06-27 NOTE — TELEPHONE ENCOUNTER
Dr Laughlin, about one year ago at physical. She suggested a sleep study. He would like that done now. Can you put in a referral? Please call him at:  [phone].  May leave a detailed message.  Jayda Barboza RN  [facility]    Additional Information    Negative: Nursing judgment    Negative: Nursing judgment    Negative: Nursing judgment    Nursing judgment    Protocols used: INFORMATION ONLY CALL - NO TRIAGE-A-OH

## 2022-06-28 NOTE — TELEPHONE ENCOUNTER
Relayed referral information to patient. Patient will wait for call from scheduling, or will reach out if not contacted within 2 business days.

## 2022-07-06 DIAGNOSIS — M1A.0720 IDIOPATHIC CHRONIC GOUT OF LEFT FOOT WITHOUT TOPHUS: ICD-10-CM

## 2022-07-07 RX ORDER — ALLOPURINOL 300 MG/1
TABLET ORAL
Qty: 90 TABLET | Refills: 0 | Status: SHIPPED | OUTPATIENT
Start: 2022-07-07 | End: 2022-11-14

## 2022-07-07 NOTE — TELEPHONE ENCOUNTER
Routing refill request to provider for review/approval because:     Gout Agents Protocol Failed 07/06/2022 08:24 AM   Protocol Details  CBC on file in past 12 months    Has Uric Acid on file in past 12 months and value is less than 6    Normal serum creatinine on file in the past 12 months        Maura Celaya RN

## 2022-09-16 ENCOUNTER — OFFICE VISIT (OUTPATIENT)
Dept: URGENT CARE | Facility: URGENT CARE | Age: 67
End: 2022-09-16
Payer: MEDICARE

## 2022-09-16 VITALS
TEMPERATURE: 98.1 F | RESPIRATION RATE: 18 BRPM | OXYGEN SATURATION: 95 % | SYSTOLIC BLOOD PRESSURE: 130 MMHG | HEART RATE: 72 BPM | DIASTOLIC BLOOD PRESSURE: 86 MMHG

## 2022-09-16 DIAGNOSIS — R30.0 DYSURIA: ICD-10-CM

## 2022-09-16 DIAGNOSIS — N41.0 ACUTE PROSTATITIS: Primary | ICD-10-CM

## 2022-09-16 LAB
ALBUMIN UR-MCNC: NEGATIVE MG/DL
APPEARANCE UR: CLEAR
BILIRUB UR QL STRIP: NEGATIVE
COLOR UR AUTO: YELLOW
GLUCOSE UR STRIP-MCNC: NEGATIVE MG/DL
HGB UR QL STRIP: NEGATIVE
KETONES UR STRIP-MCNC: NEGATIVE MG/DL
LEUKOCYTE ESTERASE UR QL STRIP: NEGATIVE
NITRATE UR QL: NEGATIVE
PH UR STRIP: 6.5 [PH] (ref 5–7)
SP GR UR STRIP: <=1.005 (ref 1–1.03)
UROBILINOGEN UR STRIP-ACNC: 0.2 E.U./DL

## 2022-09-16 PROCEDURE — 81003 URINALYSIS AUTO W/O SCOPE: CPT | Performed by: FAMILY MEDICINE

## 2022-09-16 PROCEDURE — 99213 OFFICE O/P EST LOW 20 MIN: CPT | Performed by: FAMILY MEDICINE

## 2022-09-16 RX ORDER — SULFAMETHOXAZOLE/TRIMETHOPRIM 800-160 MG
1 TABLET ORAL 2 TIMES DAILY
Qty: 20 TABLET | Refills: 0 | Status: SHIPPED | OUTPATIENT
Start: 2022-09-16 | End: 2022-09-26

## 2022-09-16 RX ORDER — TAMSULOSIN HYDROCHLORIDE 0.4 MG/1
0.4 CAPSULE ORAL DAILY
Qty: 14 CAPSULE | Refills: 0 | Status: SHIPPED | OUTPATIENT
Start: 2022-09-16 | End: 2022-09-30

## 2022-09-16 NOTE — PROGRESS NOTES
SUBJECTIVE: Martin Flaherty is a 67 year old male presenting with a chief complaint of trouble emptying bladder.  Onset of symptoms was 1 week(s) ago.  Course of illness is worsening.      Past Medical History:   Diagnosis Date     Acid reflux disease      Atrial tachycardia (H)      Benign essential hypertension      Chronic gout      History of atrial fibrillation     remote; not on AC     Hypertriglyceridemia      Obesity (BMI 30-39.9)      Pure hypercholesterolemia      No Known Allergies  Social History     Tobacco Use     Smoking status: Never Smoker     Smokeless tobacco: Never Used   Substance Use Topics     Alcohol use: Not Currently       ROS:  SKIN: no rash  GI: no vomiting    OBJECTIVE:  /86   Pulse 72   Temp 98.1  F (36.7  C)   Resp 18   SpO2 95% GENERAL APPEARANCE: healthy, alert and no distress  Rectal exam: negative  SKIN: no suspicious lesions or rashes      ICD-10-CM    1. Acute prostatitis  N41.0 tamsulosin (FLOMAX) 0.4 MG capsule     sulfamethoxazole-trimethoprim (BACTRIM DS) 800-160 MG tablet   2. Dysuria  R30.0 UA macro with reflex to Microscopic and Culture - Clinc Collect     Keep f/u Urology next week  Fluids/Rest, f/u if worse/not any better

## 2022-10-16 ENCOUNTER — HEALTH MAINTENANCE LETTER (OUTPATIENT)
Age: 67
End: 2022-10-16

## 2022-11-11 DIAGNOSIS — M1A.0720 IDIOPATHIC CHRONIC GOUT OF LEFT FOOT WITHOUT TOPHUS: ICD-10-CM

## 2022-11-14 RX ORDER — ALLOPURINOL 300 MG/1
TABLET ORAL
Qty: 90 TABLET | Refills: 0 | Status: SHIPPED | OUTPATIENT
Start: 2022-11-14 | End: 2023-02-14

## 2022-11-14 NOTE — TELEPHONE ENCOUNTER
Routing refill request to provider for review/approval because:  Labs out of range:    Creatinine   Date Value Ref Range Status   04/25/2022 1.31 (H) 0.66 - 1.25 mg/dL Final   01/07/2021 1.17 0.66 - 1.25 mg/dL Final     Justice L. Phoenix, RN

## 2023-02-13 DIAGNOSIS — E78.1 HYPERTRIGLYCERIDEMIA: ICD-10-CM

## 2023-02-13 RX ORDER — FENOFIBRATE 145 MG/1
145 TABLET, COATED ORAL DAILY
Qty: 90 TABLET | Refills: 0 | Status: SHIPPED | OUTPATIENT
Start: 2023-02-13 | End: 2023-07-05

## 2023-02-14 DIAGNOSIS — M1A.0720 IDIOPATHIC CHRONIC GOUT OF LEFT FOOT WITHOUT TOPHUS: ICD-10-CM

## 2023-02-14 RX ORDER — ALLOPURINOL 300 MG/1
TABLET ORAL
Qty: 90 TABLET | Refills: 0 | Status: SHIPPED | OUTPATIENT
Start: 2023-02-14 | End: 2023-06-26

## 2023-02-22 DIAGNOSIS — E78.5 HYPERLIPIDEMIA LDL GOAL <130: ICD-10-CM

## 2023-02-22 DIAGNOSIS — I10 BENIGN ESSENTIAL HYPERTENSION: ICD-10-CM

## 2023-02-22 DIAGNOSIS — I47.10 PAROXYSMAL SUPRAVENTRICULAR TACHYCARDIA (H): ICD-10-CM

## 2023-02-22 RX ORDER — LISINOPRIL 2.5 MG/1
2.5 TABLET ORAL DAILY
Qty: 90 TABLET | Refills: 0 | Status: SHIPPED | OUTPATIENT
Start: 2023-02-22 | End: 2023-06-22

## 2023-02-22 NOTE — TELEPHONE ENCOUNTER
Received refill request for:  Lisinopril    Overdue for follow up from 2022. Orders . Orders extended. 2 attempts have been made to schedule. 3rd attempt, letter sent. 90 days given. Further refills pending visit made.    [facility] Cardiology Refill Guideline reviewed.  Medication meets criteria for refill.    Roxanna Valentine RN, BSN  23 at 12:52 PM

## 2023-02-27 DIAGNOSIS — E78.5 HYPERLIPIDEMIA LDL GOAL <130: ICD-10-CM

## 2023-02-27 RX ORDER — SIMVASTATIN 40 MG
40 TABLET ORAL AT BEDTIME
Qty: 90 TABLET | Refills: 0 | Status: SHIPPED | OUTPATIENT
Start: 2023-02-27 | End: 2023-07-05

## 2023-04-20 ENCOUNTER — PATIENT OUTREACH (OUTPATIENT)
Dept: CARE COORDINATION | Facility: CLINIC | Age: 68
End: 2023-04-20
Payer: MEDICARE

## 2023-04-26 DIAGNOSIS — I47.10 PAROXYSMAL SUPRAVENTRICULAR TACHYCARDIA (H): ICD-10-CM

## 2023-04-26 RX ORDER — FLECAINIDE ACETATE 50 MG/1
75 TABLET ORAL 2 TIMES DAILY
Qty: 270 TABLET | Refills: 0 | Status: SHIPPED | OUTPATIENT
Start: 2023-04-26 | End: 2023-08-31

## 2023-04-26 NOTE — TELEPHONE ENCOUNTER
Received refill request for:  Flecainide    Future Appointments   Date Time Provider Department Center   5/23/2023  2:15 PM [facility]   5/23/2023  3:15 PM Caleb Sharp MD [facility] Cardiology Refill Guideline reviewed.  Medication meets criteria for refill.    Roxanna Valentine RN, BSN  04/26/23 at 4:00 PM

## 2023-06-01 ENCOUNTER — HEALTH MAINTENANCE LETTER (OUTPATIENT)
Age: 68
End: 2023-06-01

## 2023-06-04 DIAGNOSIS — K21.9 GASTROESOPHAGEAL REFLUX DISEASE WITHOUT ESOPHAGITIS: ICD-10-CM

## 2023-06-04 NOTE — LETTER
NIKKI [facility]  [address] [place] [address]  [phone]  June 12, 2023  Martin Flaherty  [address] [facility] [id number]    Dear Miki,    I am contacting you regarding the refill request we received for you. After reviewing your chart it looks like you are overdue for your annual and for a med check. Please call [phone] to  schedule this to continue to receive refills. If you anticipate running out before your appointment let us know and we can send in a wild refill.       Let them know there is a note in your chart with times to be worked in to be seen.         Thank you,     NIKKI [facility] nursing staff

## 2023-06-05 RX ORDER — PANTOPRAZOLE SODIUM 40 MG/1
40 TABLET, DELAYED RELEASE ORAL DAILY
Qty: 90 TABLET | Refills: 0 | OUTPATIENT
Start: 2023-06-05

## 2023-06-22 DIAGNOSIS — K21.9 GASTROESOPHAGEAL REFLUX DISEASE WITHOUT ESOPHAGITIS: ICD-10-CM

## 2023-06-22 DIAGNOSIS — I47.10 PAROXYSMAL SUPRAVENTRICULAR TACHYCARDIA (H): ICD-10-CM

## 2023-06-22 DIAGNOSIS — I10 BENIGN ESSENTIAL HYPERTENSION: ICD-10-CM

## 2023-06-22 RX ORDER — LISINOPRIL 2.5 MG/1
2.5 TABLET ORAL DAILY
Qty: 90 TABLET | Refills: 0 | Status: SHIPPED | OUTPATIENT
Start: 2023-06-22 | End: 2023-07-05

## 2023-06-22 RX ORDER — AMLODIPINE BESYLATE 10 MG/1
10 TABLET ORAL DAILY
Qty: 90 TABLET | Refills: 0 | Status: SHIPPED | OUTPATIENT
Start: 2023-06-22 | End: 2023-07-05

## 2023-06-26 ENCOUNTER — TELEPHONE (OUTPATIENT)
Dept: CARDIOLOGY | Facility: CLINIC | Age: 68
End: 2023-06-26
Payer: MEDICARE

## 2023-06-26 DIAGNOSIS — I48.0 PAROXYSMAL ATRIAL FIBRILLATION (H): Primary | ICD-10-CM

## 2023-06-26 DIAGNOSIS — I47.10 SVT (SUPRAVENTRICULAR TACHYCARDIA) (H): ICD-10-CM

## 2023-06-26 DIAGNOSIS — Z13.220 SCREENING FOR HYPERLIPIDEMIA: ICD-10-CM

## 2023-06-26 DIAGNOSIS — M1A.0720 IDIOPATHIC CHRONIC GOUT OF LEFT FOOT WITHOUT TOPHUS: ICD-10-CM

## 2023-06-26 RX ORDER — ALLOPURINOL 300 MG/1
TABLET ORAL
Qty: 90 TABLET | Refills: 0 | Status: SHIPPED | OUTPATIENT
Start: 2023-06-26 | End: 2023-07-05

## 2023-07-05 DIAGNOSIS — E78.1 HYPERTRIGLYCERIDEMIA: ICD-10-CM

## 2023-07-05 DIAGNOSIS — K21.9 GASTROESOPHAGEAL REFLUX DISEASE WITHOUT ESOPHAGITIS: ICD-10-CM

## 2023-07-05 DIAGNOSIS — M1A.0720 IDIOPATHIC CHRONIC GOUT OF LEFT FOOT WITHOUT TOPHUS: ICD-10-CM

## 2023-07-05 DIAGNOSIS — E78.5 HYPERLIPIDEMIA LDL GOAL <130: ICD-10-CM

## 2023-07-05 DIAGNOSIS — I10 BENIGN ESSENTIAL HYPERTENSION: ICD-10-CM

## 2023-07-05 DIAGNOSIS — I47.10 PAROXYSMAL SUPRAVENTRICULAR TACHYCARDIA (H): ICD-10-CM

## 2023-07-05 RX ORDER — AMLODIPINE BESYLATE 10 MG/1
10 TABLET ORAL DAILY
Qty: 90 TABLET | Refills: 3 | Status: SHIPPED | OUTPATIENT
Start: 2023-07-05 | End: 2024-09-24

## 2023-07-05 RX ORDER — FENOFIBRATE 145 MG/1
145 TABLET, COATED ORAL DAILY
Qty: 90 TABLET | Refills: 3 | Status: SHIPPED | OUTPATIENT
Start: 2023-07-05 | End: 2024-09-24

## 2023-07-05 RX ORDER — LISINOPRIL 2.5 MG/1
2.5 TABLET ORAL DAILY
Qty: 90 TABLET | Refills: 3 | Status: SHIPPED | OUTPATIENT
Start: 2023-07-05 | End: 2024-09-24

## 2023-07-05 RX ORDER — ALLOPURINOL 300 MG/1
1 TABLET ORAL DAILY
Qty: 90 TABLET | Refills: 3 | Status: SHIPPED | OUTPATIENT
Start: 2023-07-05 | End: 2023-09-22

## 2023-07-05 RX ORDER — SIMVASTATIN 40 MG
40 TABLET ORAL AT BEDTIME
Qty: 90 TABLET | Refills: 3 | Status: SHIPPED | OUTPATIENT
Start: 2023-07-05 | End: 2024-08-12

## 2023-07-05 RX ORDER — FAMOTIDINE 20 MG/1
20 TABLET, FILM COATED ORAL 2 TIMES DAILY PRN
Qty: 60 TABLET | Refills: 3 | Status: SHIPPED | OUTPATIENT
Start: 2023-07-05

## 2023-08-31 DIAGNOSIS — I47.10 PAROXYSMAL SUPRAVENTRICULAR TACHYCARDIA (H): ICD-10-CM

## 2023-08-31 RX ORDER — FLECAINIDE ACETATE 50 MG/1
75 TABLET ORAL 2 TIMES DAILY
Qty: 270 TABLET | Refills: 0 | Status: SHIPPED | OUTPATIENT
Start: 2023-08-31 | End: 2023-12-12

## 2023-10-06 ENCOUNTER — APPOINTMENT (OUTPATIENT)
Dept: CT IMAGING | Facility: CLINIC | Age: 68
End: 2023-10-06
Attending: EMERGENCY MEDICINE
Payer: MEDICARE

## 2023-10-06 ENCOUNTER — OFFICE VISIT (OUTPATIENT)
Dept: URGENT CARE | Facility: URGENT CARE | Age: 68
End: 2023-10-06
Payer: MEDICARE

## 2023-10-06 ENCOUNTER — ANCILLARY PROCEDURE (OUTPATIENT)
Dept: GENERAL RADIOLOGY | Facility: CLINIC | Age: 68
End: 2023-10-06
Attending: PHYSICIAN ASSISTANT
Payer: MEDICARE

## 2023-10-06 ENCOUNTER — HOSPITAL ENCOUNTER (EMERGENCY)
Facility: CLINIC | Age: 68
Discharge: HOME | End: 2023-10-06
Attending: EMERGENCY MEDICINE | Admitting: EMERGENCY MEDICINE
Payer: MEDICARE

## 2023-10-06 VITALS
OXYGEN SATURATION: 97 % | SYSTOLIC BLOOD PRESSURE: 136 MMHG | TEMPERATURE: 97.3 F | RESPIRATION RATE: 16 BRPM | HEART RATE: 70 BPM | DIASTOLIC BLOOD PRESSURE: 89 MMHG

## 2023-10-06 VITALS
DIASTOLIC BLOOD PRESSURE: 86 MMHG | TEMPERATURE: 97 F | RESPIRATION RATE: 16 BRPM | SYSTOLIC BLOOD PRESSURE: 122 MMHG | HEART RATE: 69 BPM | OXYGEN SATURATION: 97 %

## 2023-10-06 DIAGNOSIS — R13.19 MECHANICAL DYSPHAGIA: ICD-10-CM

## 2023-10-06 DIAGNOSIS — R22.1 THROAT SWELLING: ICD-10-CM

## 2023-10-06 DIAGNOSIS — M25.78 OSTEOPHYTE OF CERVICAL SPINE: Primary | ICD-10-CM

## 2023-10-06 DIAGNOSIS — R13.12 OROPHARYNGEAL DYSPHAGIA: ICD-10-CM

## 2023-10-06 DIAGNOSIS — R07.0 THROAT PAIN: Primary | ICD-10-CM

## 2023-10-06 LAB
ANION GAP SERPL CALCULATED.3IONS-SCNC: 9 MMOL/L (ref 7–15)
BASO+EOS+MONOS # BLD AUTO: NORMAL 10*3/UL
BASO+EOS+MONOS NFR BLD AUTO: NORMAL %
BASOPHILS # BLD AUTO: 0.1 10E3/UL (ref 0–0.2)
BASOPHILS NFR BLD AUTO: 1 %
BUN SERPL-MCNC: 12.2 MG/DL (ref 8–23)
CALCIUM SERPL-MCNC: 9.4 MG/DL (ref 8.8–10.2)
CHLORIDE SERPL-SCNC: 105 MMOL/L (ref 98–107)
CREAT SERPL-MCNC: 1.3 MG/DL (ref 0.67–1.17)
DEPRECATED HCO3 PLAS-SCNC: 26 MMOL/L (ref 22–29)
DEPRECATED S PYO AG THROAT QL EIA: NEGATIVE
EGFRCR SERPLBLD CKD-EPI 2021: 60 ML/MIN/1.73M2
EOSINOPHIL # BLD AUTO: 0 10E3/UL (ref 0–0.7)
EOSINOPHIL NFR BLD AUTO: 1 %
ERYTHROCYTE [DISTWIDTH] IN BLOOD BY AUTOMATED COUNT: 12.5 % (ref 10–15)
GLUCOSE SERPL-MCNC: 100 MG/DL (ref 70–99)
GROUP A STREP BY PCR: NOT DETECTED
HCT VFR BLD AUTO: 46.6 % (ref 40–53)
HGB BLD-MCNC: 15.6 G/DL (ref 13.3–17.7)
HOLD SPECIMEN: NORMAL
HOLD SPECIMEN: NORMAL
IMM GRANULOCYTES # BLD: 0 10E3/UL
IMM GRANULOCYTES NFR BLD: 0 %
LYMPHOCYTES # BLD AUTO: 0.9 10E3/UL (ref 0.8–5.3)
LYMPHOCYTES NFR BLD AUTO: 15 %
MCH RBC QN AUTO: 30.2 PG (ref 26.5–33)
MCHC RBC AUTO-ENTMCNC: 33.5 G/DL (ref 31.5–36.5)
MCV RBC AUTO: 90 FL (ref 78–100)
MONOCYTES # BLD AUTO: 0.5 10E3/UL (ref 0–1.3)
MONOCYTES NFR BLD AUTO: 8 %
NEUTROPHILS # BLD AUTO: 4.4 10E3/UL (ref 1.6–8.3)
NEUTROPHILS NFR BLD AUTO: 75 %
NRBC # BLD AUTO: 0 10E3/UL
NRBC BLD AUTO-RTO: 0 /100
PLATELET # BLD AUTO: 161 10E3/UL (ref 150–450)
POTASSIUM SERPL-SCNC: 4 MMOL/L (ref 3.4–5.3)
RBC # BLD AUTO: 5.16 10E6/UL (ref 4.4–5.9)
SODIUM SERPL-SCNC: 140 MMOL/L (ref 135–145)
WBC # BLD AUTO: 5.8 10E3/UL (ref 4–11)

## 2023-10-06 PROCEDURE — 80048 BASIC METABOLIC PNL TOTAL CA: CPT | Performed by: EMERGENCY MEDICINE

## 2023-10-06 PROCEDURE — 87651 STREP A DNA AMP PROBE: CPT | Performed by: PHYSICIAN ASSISTANT

## 2023-10-06 PROCEDURE — 70496 CT ANGIOGRAPHY HEAD: CPT | Mod: MF

## 2023-10-06 PROCEDURE — 99214 OFFICE O/P EST MOD 30 MIN: CPT | Performed by: PHYSICIAN ASSISTANT

## 2023-10-06 PROCEDURE — 70450 CT HEAD/BRAIN W/O DYE: CPT | Mod: MF,XU

## 2023-10-06 PROCEDURE — 258N000003 HC RX IP 258 OP 636: Performed by: EMERGENCY MEDICINE

## 2023-10-06 PROCEDURE — 250N000009 HC RX 250: Performed by: EMERGENCY MEDICINE

## 2023-10-06 PROCEDURE — 96361 HYDRATE IV INFUSION ADD-ON: CPT

## 2023-10-06 PROCEDURE — 250N000011 HC RX IP 250 OP 636: Performed by: EMERGENCY MEDICINE

## 2023-10-06 PROCEDURE — 99285 EMERGENCY DEPT VISIT HI MDM: CPT | Mod: 25

## 2023-10-06 PROCEDURE — 70498 CT ANGIOGRAPHY NECK: CPT | Mod: MF

## 2023-10-06 PROCEDURE — 96360 HYDRATION IV INFUSION INIT: CPT | Mod: 59

## 2023-10-06 PROCEDURE — 36415 COLL VENOUS BLD VENIPUNCTURE: CPT | Performed by: EMERGENCY MEDICINE

## 2023-10-06 PROCEDURE — 85025 COMPLETE CBC W/AUTO DIFF WBC: CPT | Performed by: EMERGENCY MEDICINE

## 2023-10-06 PROCEDURE — 70360 X-RAY EXAM OF NECK: CPT | Mod: TC | Performed by: RADIOLOGY

## 2023-10-06 RX ORDER — METHYLPREDNISOLONE 4 MG
TABLET, DOSE PACK ORAL
Qty: 21 TABLET | Refills: 0 | Status: SHIPPED | OUTPATIENT
Start: 2023-10-06 | End: 2023-10-31

## 2023-10-06 RX ORDER — IOPAMIDOL 755 MG/ML
500 INJECTION, SOLUTION INTRAVASCULAR ONCE
Status: COMPLETED | OUTPATIENT
Start: 2023-10-06 | End: 2023-10-06

## 2023-10-06 RX ORDER — OMEPRAZOLE 40 MG/1
40 CAPSULE, DELAYED RELEASE ORAL DAILY
Qty: 30 CAPSULE | Refills: 0 | Status: SHIPPED | OUTPATIENT
Start: 2023-10-06 | End: 2024-04-15

## 2023-10-06 RX ORDER — AMOXICILLIN 875 MG
875 TABLET ORAL 2 TIMES DAILY
Qty: 20 TABLET | Refills: 0 | Status: SHIPPED | OUTPATIENT
Start: 2023-10-06 | End: 2023-10-16

## 2023-10-06 RX ADMIN — SODIUM CHLORIDE 1000 ML: 9 INJECTION, SOLUTION INTRAVENOUS at 18:48

## 2023-10-06 RX ADMIN — SODIUM CHLORIDE 80 ML: 9 INJECTION, SOLUTION INTRAVENOUS at 18:37

## 2023-10-06 RX ADMIN — IOPAMIDOL 67 ML: 755 INJECTION, SOLUTION INTRAVENOUS at 18:37

## 2023-10-06 ASSESSMENT — ACTIVITIES OF DAILY LIVING (ADL): ADLS_ACUITY_SCORE: 35

## 2023-10-06 NOTE — PROGRESS NOTES
Assessment & Plan     Throat pain    Strep neg, culture pending  Due to throat swelling and feeling like food and drink is getting stuck in throat will cover with antibiotics as he will be on steroids as well    - Streptococcus A Rapid Screen w/Reflex to PCR  - Group A Streptococcus PCR Throat Swab  - amoxicillin (AMOXIL) 875 MG tablet; Take 1 tablet (875 mg) by mouth 2 times daily for 10 days    Oropharyngeal dysphagia    Xray neck Negative for acute findings, read by Greg WREN at time of visit.    Medrol for throat swelling  Referral to ENT  - XR Neck Soft Tissue; Future  - omeprazole (PRILOSEC) 40 MG DR capsule; Take 1 capsule (40 mg) by mouth daily  - Adult ENT  Referral; Future    Throat swelling    - XR Neck Soft Tissue; Future  - methylPREDNISolone (MEDROL DOSEPAK) 4 MG tablet therapy pack; Follow package instructions  - Adult ENT  Referral; Future    Review of external notes as documented elsewhere in note       CONSULTATION/REFERRAL to ENT    No follow-ups on file.    Greg Victoria, MMS, LINDSAY  [facility] STEPHANIE Livingston is a 68 year old, presenting for the following health issues:  Pharyngitis (Pt has had throat irritation for awhile but today when eating he felt like the food was getting stuck )      HPI     Review of Systems   Constitutional, HEENT, cardiovascular, pulmonary, GI, , musculoskeletal, neuro, skin, endocrine and psych systems are negative, except as otherwise noted.      Objective    /86   Pulse 69   Temp 97  F (36.1  C) (Tympanic)   Resp 16   SpO2 97%   There is no height or weight on file to calculate BMI.  Physical Exam   GENERAL: healthy, alert and no distress  EYES: Eyes grossly normal to inspection, PERRL and conjunctivae and sclerae normal  HENT: normal cephalic/atraumatic, ear canals and TM's normal, nose and mouth without ulcers or lesions, tonsillar hypertrophy, and tonsillar erythema  NECK: bilateral  anterior cervical adenopathy, no asymmetry, masses, or scars, and thyroid normal to palpation  RESP: lungs clear to auscultation - no rales, rhonchi or wheezes  CV: regular rate and rhythm, normal S1 S2, no S3 or S4, no murmur, click or rub, no peripheral edema and peripheral pulses strong  MS: no gross musculoskeletal defects noted, no edema  SKIN: no suspicious lesions or rashes  NEURO: Normal strength and tone, mentation intact and speech normal  PSYCH: mentation appears normal, affect normal/bright    Xray - Reviewed and interpreted by me.  Negative for acute findings, read by Greg WREN at time of visit.

## 2023-10-06 NOTE — ED PROVIDER NOTES
History     Chief Complaint:  Dysphagia     HPI   Martin Flaherty is a 68 year old male with history of hypertension and a-fib who presents to the ED with episode of difficulty swallowing. Patient reports that for several months now he has had difficulty swallowing solids.  Then today, it felt like he had food stuck in his throat that he could not get down. He did not choke or vomit. Able to swallow water, but report it feels funny, like something isn't right, pulling or tugging in his throat. He denies difficulty breathing. He denies any slurred speech, weakness, dizziness, confusion.  Denies nausea or vomiting.      Independent Historian:    None - Patient Only    Review of External Notes:  I reviewed the urgent care note from [facility] from 10/6/2023.  Strep negative, culture pending.  Prescribed amoxicillin and Medrol Dosepak.  Referred to ENT.  X-ray neck soft tissue did not show soft tissue swelling.    Allergies:  No Known Allergies     Physical Exam   Patient Vitals for the past 24 hrs:   BP Temp Temp src Pulse Resp SpO2   10/06/23 2023 136/89 -- -- 70 -- --   10/06/23 2017 -- -- -- -- -- 97 %   10/06/23 1800 (!) 142/92 -- -- 72 -- 98 %   10/06/23 1626 127/85 97.3  F (36.3  C) Temporal 73 16 98 %      Physical Exam  Vitals reviewed.   Constitutional:       General: He is not in acute distress.     Appearance: Normal appearance. He is not ill-appearing, toxic-appearing or diaphoretic.   HENT:      Head:      Salivary Glands: Right salivary gland is not diffusely enlarged. Left salivary gland is not diffusely enlarged.      Mouth/Throat:      Mouth: Mucous membranes are dry.      Tongue: Tongue does not deviate from midline.      Palate: No mass.      Pharynx: Oropharynx is clear. Uvula midline. No pharyngeal swelling, oropharyngeal exudate, posterior oropharyngeal erythema or uvula swelling.      Tonsils: No tonsillar exudate or tonsillar abscesses.   Eyes:      General: No visual field  deficit.  Cardiovascular:      Rate and Rhythm: Normal rate and regular rhythm.   Pulmonary:      Effort: Pulmonary effort is normal.      Breath sounds: Normal breath sounds. No stridor. No wheezing or rhonchi.   Abdominal:      General: Abdomen is flat. Bowel sounds are normal.      Palpations: Abdomen is soft.   Musculoskeletal:      Cervical back: Neck supple. No tenderness.   Skin:     Capillary Refill: Capillary refill takes less than 2 seconds.   Neurological:      General: No focal deficit present.      Mental Status: He is alert and oriented to person, place, and time.      Cranial Nerves: No cranial nerve deficit, dysarthria or facial asymmetry.      Sensory: Sensation is intact. No sensory deficit.      Motor: No weakness, tremor, atrophy, abnormal muscle tone, seizure activity or pronator drift.      Coordination: Coordination is intact. Romberg sign negative. Coordination normal. Finger-Nose-Finger Test and Heel to Shin Test normal.   Psychiatric:         Mood and Affect: Mood normal.         Behavior: Behavior normal.         Thought Content: Thought content normal.         Judgment: Judgment normal.           Emergency Department Course     Laboratory: Imaging:   Labs Ordered and Resulted from Time of ED Arrival to Time of ED Departure   BASIC METABOLIC PANEL - Abnormal       Result Value    Sodium 140      Potassium 4.0      Chloride 105      Carbon Dioxide (CO2) 26      Anion Gap 9      Urea Nitrogen 12.2      Creatinine 1.30 (*)     GFR Estimate 60 (*)     Calcium 9.4      Glucose 100 (*)    CBC WITH PLATELETS AND DIFFERENTIAL    WBC Count 5.8      RBC Count 5.16      Hemoglobin 15.6      Hematocrit 46.6      MCV 90      MCH 30.2      MCHC 33.5      RDW 12.5      Platelet Count 161      % Neutrophils 75      % Lymphocytes 15      % Monocytes 8      Mids % (Monos, Eos, Basos)        % Eosinophils 1      % Basophils 1      % Immature Granulocytes 0      NRBCs per 100 WBC 0      Absolute Neutrophils  4.4      Absolute Lymphocytes 0.9      Absolute Monocytes 0.5      Mids Abs (Monos, Eos, Basos)        Absolute Eosinophils 0.0      Absolute Basophils 0.1      Absolute Immature Granulocytes 0.0      Absolute NRBCs 0.0       CTA Head Neck with Contrast   Final Result   IMPRESSION:       HEAD CTA:    1.  No substantial stenosis or occlusion involving the major intracranial arteries.   2.  No aneurysm.      NECK CTA:   1.  No substantial stenosis or occlusion involving the major arteries of the neck.   2.  No evidence of dissection.   3.  Large protuberant ventrally directed osteophytes spanning C3-C7, which exert some degree of mass effect on the proximal thoracic esophagus, and are candidate source of patient's reported dysphagia. There is some suggestion of asymmetric left    eccentric soft tissue thickening at the origin of the thoracic esophagus, though this may represent anatomic distortion and apposed mucosal surfaces related to the ventral osteophytes. Endoscopy could be considered for further evaluation to rule out    underlying mass.   4.  No enhancing mass along the aerodigestive tract.      Head CT w/o contrast   Final Result   IMPRESSION:   1.  Normal head CT.         Report per radiology     Procedures   none    Emergency Department Course & Assessments:     Interventions:  Medications   sodium chloride 0.9% BOLUS 1,000 mL (0 mLs Intravenous Stopped 10/6/23 2027)   iopamidol (ISOVUE-370) solution 500 mL (67 mLs Intravenous $Given 10/6/23 1837)   CT scan flush (80 mLs Intravenous $Given 10/6/23 1837)      Assessments, Independent Interpretation, Consult/Discussion of ManagementTests:  ED Course as of 10/06/23 2046   Fri Oct 06, 2023   1754 Creatinine(!): 1.30  Baseline elevated Cr, no ELIZABETH   1754 CBC with platelets differential  Normal CBC   1805 I examined the patient.  Normal neurological exam.  Patent airway.   1815 Dr. Park discussed the case with Mari Cunningham MD. Discussed presentation, exam, ddx,  plan.   1818 Dr. Park' evaluation.     Social Determinants of Health affecting care:  None    Disposition:  The patient was discharged to home.     Impression & Plan    CMS Diagnoses: None    Code Status: No Order    Medical Decision Making:    This 68 year old male presents to the ED due to Dysphagia   . Please see the HPI and exam for specifics. A broad differential was considered including stroke, angioedema, pharyngitis, mechanical esophageal obstruction, esophageal spasm, esophageal dysmotility disorder.    Based on the differential, exam, and any decision tools, the above workup was undertaken. Lab and imaging results were reviewed by me and are notable for large C3-C7 osteophytes spanning with mass effect compressing the proximal thoracic esophagus, likely the source of patient's dysphagia. Soft tissue swelling most likely related to osteophytes, but will place referral to GI for consideration of endoscopy to evaluate for mass. CTA head and neck and CT head negative for stroke.     Management of these findings included dysphagia diet.     Based on this, I felt that the most likely etiology of their symptoms is mechanical compression from large cervical spine osteophytes. I believe that a reasonable course of action is that they can be discharged.    At this moment, appropriate for continued outpatient workup.  Referral to spine and GI placed.     Anticipatory guidance given prior to discharge.    Critical Care time:  was 0 minutes for this patient excluding procedures.    Diagnosis:    ICD-10-CM    1. Osteophyte of cervical spine  M25.78 Spine  Referral      2. Mechanical dysphagia  R13.19 Spine  Referral         Discharge Medications:  New Prescriptions    No medications on file     Mari Cunningham MD  PGY2  10/6/2023  8:46 PM    Scribe Disclosure:  JOSHUA INMAN, am serving as a scribe at 5:21 PM on 10/6/2023 to document services personally performed by Mohsen Park DO based on my  observations and the provider's statements to me.     10/6/2023   Mohsen Park DO Ward, Lily Kathleen, MD  10/06/23 2046

## 2023-10-06 NOTE — ED TRIAGE NOTES
Patient reports an inability to swallow his food properly. First noted today at lunch around noon. Has since tried to drink water and is unable to swallow the water normally. Gardner not feel like anything is obstructing throat. On assessment, no redness or swelling noted. No difficulty breathing. BEFAST negative in triage. No stroke code at this time per Dr. Paredes.

## 2023-10-06 NOTE — ED PROVIDER NOTES
Emergency Department Attending Supervision Note  10/6/2023  6:15 PM    I evaluated this patient in conjunction with Mari Cunningham MD (PGY2)  I have participated in the care of the patient and personally performed key elements of the history, exam, and medical decision making.      HPI:   Martin Flaherty is a 68 year old male several months of progressive dysphagia. Felt that there was food he couldn't get down. He had gone to . Strep was negative. They recommended medications and DC. Denies abdominal pain. He feels the sensation today is closer to his baseline.    Independent Historian:   None - Patient Only    Review of External Notes:    Note - strep negative. ENT referral. Abx given.      EXAM:  Constitutional: Vital signs reviewed as above.   HENT:    Head: No external signs of trauma noted.   Eyes: Pupils are equal, round, and reactive to light.    Oropharynx: Mild erythema, no exudate. No FB visible.   Neck: No stridor noted.  Cardiovascular: Normal rate, regular rhythm, normal heart sounds and intact distal pulses.    Pulmonary/Chest: Effort normal and breath sounds normal. No respiratory distress. No wheezes noted.   Gastrointestinal: Soft. There is no tenderness. There is no rebound.   Musculoskeletal:   No deformities appreciated   No edema noted  Neurological:    Patient is alert and oriented to person, place, and time.    Speech is fluent, cognition is normal.   CN 2-12 intact (PERRL, EOMI, symmetric smile, equal eye squeeze and forehead raise, normal and equal sensation to bilateral forehead/cheek/chin, grossly equal hearing B/L, midline tongue protrusion with nl side-to-side movement, normal shoulder shrug).    RUE strength 5/5: , finger abd, wrist flex/ext, elbow flex/ext.    LUE strength 5/5: , finger abd, wrist flex/ext, elbow flex/ext.    RLE strength 5/5: ankle flex/ext, knee flex/ext, hip flex.    LLE strength 5/5: ankle flex/ext, knee flex/ext, hip flex.    Sensation equal in all 4  extremities.    No arm drift.     Cerebellar: Normal rapid alternating movements     ( finger-nose-finger, rapid pronation/supination, hand rolling)    Normal heel-to-shin   Normal gait.   Skin: Skin is warm and dry.   Psychiatric: The patient appears calm    Assessments, Consultations/Discussion of Management or Tests, Independent Image interpretation     ED Course as of 10/06/23 2142   Fri Oct 06, 2023   1754 Creatinine(!): 1.30  Baseline elevated Cr, no ELIZABETH   1754 CBC with platelets differential  Normal CBC   1805 I examined the patient.  Normal neurological exam.  Patent airway.   1815 Dr. Park discussed the case with Mari Cunningham MD. Discussed presentation, exam, ddx, plan.   1818 Dr. Park' evaluation.         Social Determinants of Health affecting care:   None     MEDICAL DECISION MAKING/ASSESSMENT AND PLAN:   Broad differential to include infectious etiologies (strep test negative urgent care), stroke, primary esophageal swallowing issue, etc.    CT imaging does not reveal any obvious stroke nor hemorrhage.  Findings could be explained by the osteophyte and resultant changes seen on CT today as this is consistent with the side of the throat the patient perceives his symptoms and.    Discharge to home with outpatient spine and gastroenterology follow-up seems like a reasonable neck step in care.    Impression:    ICD-10-CM    1. Osteophyte of cervical spine  M25.78 Spine  Referral      2. Mechanical dysphagia  R13.19 Spine  Referral     Adult GI  Referral - Consult Only             DISPOSITION:  GRAHAM Park, DO  10/6/2023  [facility] EMERGENCY DEPT               Mohsen Park, DO  10/06/23 2142

## 2023-10-13 ENCOUNTER — TRANSFERRED RECORDS (OUTPATIENT)
Dept: HEALTH INFORMATION MANAGEMENT | Facility: CLINIC | Age: 68
End: 2023-10-13
Payer: MEDICARE

## 2023-10-19 ENCOUNTER — OFFICE VISIT (OUTPATIENT)
Dept: PHYSICAL MEDICINE AND REHAB | Facility: CLINIC | Age: 68
End: 2023-10-19
Payer: MEDICARE

## 2023-10-19 VITALS
DIASTOLIC BLOOD PRESSURE: 73 MMHG | HEIGHT: 69 IN | SYSTOLIC BLOOD PRESSURE: 121 MMHG | OXYGEN SATURATION: 98 % | HEART RATE: 65 BPM | BODY MASS INDEX: 36.29 KG/M2 | WEIGHT: 245 LBS

## 2023-10-19 DIAGNOSIS — M25.78 OSTEOPHYTE OF CERVICAL SPINE: ICD-10-CM

## 2023-10-19 DIAGNOSIS — M50.30 DEGENERATION OF INTERVERTEBRAL DISC OF CERVICAL REGION WITH OSTEOPHYTE OF CERVICAL VERTEBRA: Primary | ICD-10-CM

## 2023-10-19 DIAGNOSIS — R13.10 DYSPHAGIA, UNSPECIFIED TYPE: ICD-10-CM

## 2023-10-19 DIAGNOSIS — R13.19 MECHANICAL DYSPHAGIA: ICD-10-CM

## 2023-10-19 DIAGNOSIS — M25.78 DEGENERATION OF INTERVERTEBRAL DISC OF CERVICAL REGION WITH OSTEOPHYTE OF CERVICAL VERTEBRA: Primary | ICD-10-CM

## 2023-10-19 PROCEDURE — 99204 OFFICE O/P NEW MOD 45 MIN: CPT | Performed by: NURSE PRACTITIONER

## 2023-10-19 ASSESSMENT — PAIN SCALES - GENERAL: PAINLEVEL: NO PAIN (0)

## 2023-10-19 NOTE — PATIENT INSTRUCTIONS
~Spine Center Scheduling #[phone].  ~Please call our [facility] Spine Nurse Navigation #[phone] with any questions or concerns about your treatment plan, if symptoms worsen and you would like to be seen urgently, or if you have problems controlling bladder and bowel function.  ~For any future flareups or new symptoms, patients must follow-up in clinic or contact the nurse navigator line.  ~Please note that any My Chart messages may take multiple days for a response due to the high volume of patients seen in clinic.  Anything sent Thursday night or after will be answered the following week when able, as Jayna Gold CNP does not work in clinic on Fridays.   ~Jayna Gold CNP is at the [facility] on the first and third Tuesdays of the month only, otherwise primarily at the [facility].        Imaging has been ordered. Radiology will call you to schedule. Please call below if you do not hear from them in the next couple of days.     [facility] Radiology Scheduling    Please call [phone] to schedule your image(s) (select option #1). There are 3 different locations, see below.     [facility]  [id number] [place] [address] Imaging - [place]  [address], [address]   [facility] [address]    [place] [address] [facility] [address]       You have been referred to [facility] Neurosurgery for surgical consult.  They will callyou to schedule an appointment at the Spine Center.    Neurosurgery Scheduling phone #: [phone]

## 2023-10-19 NOTE — PROGRESS NOTES
ASSESSMENT: Martin Flaherty is a 68 year old male presents for consultation at the request of PCP Stephania Laughlin, with past medical history significant for hypercholesterolemia, hypertension, tachycardia, morbid obesity, GERD, history atrial fibrillation, who presents today for new patient evaluation of :    -Several months of progressive dysphagia.  Large anterior osteophytes cervical spine from C3-C7 with mass effect on the thoracic esophagus and thickening of the esophagus.    Patient denies neck pain or arm pain.  Patient is neurologically intact on exam. No myelopathic or red flag symptoms.           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 Discussed diagnosis and treatment options with the patient today. A shared decision making model was used. The patient's values and choices were respected. The following represents what was discussed and decided upon by the provider and the patient.     -DIAGNOSTIC TESTS:  Images were personally reviewed and interpreted and explained to patient today using spine model.   -- Ordered cervical CT scan to further evaluate anterior osteophytes.  Unsure if neurosurgery will need an MRI as well, will defer to their recommendation for this.  --CTA of the neck 10/6/2023 reviewed which shows large anterior osteophytes from C3-C7 with mass effect on the thoracic esophagus, thickening of the thoracic esophagus.    -Referral to [facility]  [place] neurosurgeon for surgical consult for dysphagia related to anterior cervical spine osteophytes.    -PHYSICAL THERAPY: No recommendations for physical therapy at this time as patient has no pain.      -MEDICATIONS:  No changes in medications as patient has no pain.    -INTERVENTIONS: No recommendations for injections as patient has no pain.    -PATIENT EDUCATION: Total time of 46 minutes, on the day of service, spent with the patient, reviewing the chart, placing orders, and documenting.     -FOLLOW-UP:   Follow-up for neurosurgical consult    Advised patient to call the Spine Center if symptoms worsen or you have problems controlling bladder and bowel function.   ______________________________________________________________________    SUBJECTIVE:   Martin Flaherty  is a 68 year old male who presents today for new patient evaluation of intermittent dysphagia with episode bringing him to the ER 10/6/2023 of feeling like he was unable to swallow.  Since then symptoms have improved and he does feel like he is able to swallow fine currently.  He does report phlegm on a regular basis however in his throat and wondering if this is related to his cervical spine as well.  Otherwise he denies pain at this point, currently pain is a 0/10, denies any prior cervical spine pain.  Denies arm pain.  Denies numbness or tingling sensations.  Denies upper extremity weakness.  Denies recent trips or falls or balance changes.  Denies bowel or bladder loss control.    -Treatment to Date: No prior spinal surgery or spinal injections    -Medications:  Medrol Dosepak prescribed 10/6/2023      Current Outpatient Medications   Medication    allopurinol (ZYLOPRIM) 300 MG tablet    amLODIPine (NORVASC) 10 MG tablet    famotidine (PEPCID) 20 MG tablet    fenofibrate (TRICOR) 145 MG tablet    flecainide (TAMBOCOR) 50 MG tablet    lisinopril (ZESTRIL) 2.5 MG tablet    methylPREDNISolone (MEDROL DOSEPAK) 4 MG tablet therapy pack     omeprazole (PRILOSEC) 20 MG DR capsule    omeprazole (PRILOSEC) 40 MG DR capsule    simvastatin (ZOCOR) 40 MG tablet     No current facility-administered medications for this visit.       No Known Allergies    Past Medical History:   Diagnosis Date    Acid reflux disease     Atrial tachycardia     Benign essential hypertension     Chronic gout     History of atrial fibrillation     remote; not on AC    Hypertriglyceridemia     Obesity (BMI 30-39.9)     Pure hypercholesterolemia         Patient Active Problem List   Diagnosis    History of atrial fibrillation    Pure hypercholesterolemia    Hypertriglyceridemia    Benign essential hypertension    Obesity (BMI 30-39.9)    Chronic gout    Morbid obesity (H)    Atrial tachycardia    Acid reflux disease    Osteophyte of cervical spine       Past Surgical History:   Procedure Laterality Date    APPENDECTOMY           EP STUDY, MODIFIED  2/2011    Attempted ablation 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       Family History   Problem Relation Age of Onset    Cancer Father 48        metastatic at presentation; unknown primary    Myocardial Infarction Mother         later in life    Hyperlipidemia Brother         multiple brothers    Diabetes No family hx of     Cerebrovascular Disease No family hx of     Coronary Artery Disease Early Onset No family hx of     Colon Cancer No family hx of     Prostate Cancer No family hx of        Reviewed past medical, surgical, and family history with patient found on new patient intake packet located in EMR Media tab.     SOCIAL HX: Patient is  and retired.  Patient denies smoking/tobacco use.  Does report drinking alcohol 3-4 drinks a month.  Denies history being a heavy drinker.  Denies recreational drug use.    ROS: Positive for difficulty swallowing, reflux.  Specifically negative for bowel/bladder dysfunction, balance changes, headache, dizziness, foot drop, fevers, chills, appetite changes, nausea/vomiting, unexplained  "weight loss. Otherwise 13 systems reviewed are negative. Please see the patient's intake questionnaire from today for details.    OBJECTIVE:  /73 (BP Location: Right arm, Patient Position: Sitting)   Pulse 65   Ht 5' 9\" (1.753 m)   Wt 245 lb (111.1 kg)   SpO2 98%   BMI 36.18 kg/m      PHYSICAL EXAMINATION:  --CONSTITUTIONAL: Vital signs as above. No acute distress. The patient is well nourished and well groomed.  --PSYCHIATRIC: The patient is awake, alert, oriented to person, place, time and answering questions appropriately with clear speech. Appropriate mood and affect   --HEENT: Sclera are non-injected. Extraocular muscles are intact. Thyroid moves easily upon swallowing.  Moist oral mucosa.  --RESPIRATORY: Normal rhythm and effort. No abnormal accessory muscle breathing patterns noted.   --GROSS MOTOR: Easily arises from a seated position.   --CERVICAL SPINE: Inspection reveals no evidence of deformity. Range of motion is not limited in cervical flexion, extension, lateral rotation.   --UPPER EXTREMITY MOTOR TESTING:  Wrist flexion left 5/5, right 5/5  Wrist extension left 5/5, right 5/5  Pronators left 5/5, right 5/5  Biceps left 5/5, right 5/5   Triceps left 5/5, right 5/5   Shoulder abduction left 5/5, right 5/5   left 5/5, right 5/5  --NEUROLOGIC: CN III-XII are grossly intact. 2/4 symmetric biceps, brachioradialis, triceps reflexes bilaterally. Sensation to upper extremities is intact.  Negative Agee's bilaterally.    --VASCULAR: 2/4 radial pulses bilaterally. Warm upper limbs bilaterally. Capillary refill in the upper extremities is less than 1 second.    RESULTS: [facility] and [facility] Everywhere were reviewed today.     Imaging:  Spine imaging was personally reviewed and interpreted today. The images were shown to the patient and the findings were explained using a spine model.       CTA Head Neck with Contrast    Result Date: 10/6/2023  EXAM: CTA HEAD " NECK W CONTRAST LOCATION: [facility] DATE: 10/6/2023 INDICATION: dysphagia without foreign body COMPARISON: Head CT: 10/06/2023. CONTRAST: 67mL Isovue 370 TECHNIQUE: Head and neck CT angiogram with IV contrast. Axial helical CT images of the head and neck vessels obtained during the arterial phase of intravenous contrast administration. Axial 2D reconstructed images and multiplanar 3D MIP reconstructed images of the head and neck vessels were performed by the technologist. Dose reduction techniques were used. All stenosis measurements made according to NASCET criteria unless otherwise specified. FINDINGS: HEAD CTA: ANTERIOR CIRCULATION: No stenosis/occlusion, aneurysm, or high flow vascular malformation. Standard [place] anatomy. POSTERIOR CIRCULATION: No stenosis/occlusion, aneurysm, or high flow vascular malformation. Balanced vertebral arteries supply a normal basilar artery. DURAL VENOUS SINUSES: Expected enhancement of the major dural venous sinuses. NECK CTA: RIGHT CAROTID: No measurable stenosis or dissection. LEFT CAROTID: No measurable stenosis or dissection. VERTEBRAL ARTERIES: No focal stenosis or dissection. Balanced vertebral arteries. AORTIC ARCH: Classic aortic arch anatomy with no significant stenosis at the origin of the great vessels. NONVASCULAR STRUCTURES: Large protuberant ventrally directed osteophytes spanning C3-C7, which exert some degree of mass effect on the proximal thoracic esophagus. There is some suggestion of asymmetric left eccentric soft tissue thickening at the origin  of the thoracic esophagus (series 5/image 125). No enhancing mass along the aerodigestive tract.     IMPRESSION: HEAD CTA: 1.  No substantial stenosis or occlusion involving the major intracranial arteries. 2.  No aneurysm. NECK CTA: 1.  No substantial stenosis or occlusion involving the major arteries of the neck. 2.  No evidence of dissection. 3.  Large protuberant ventrally directed  osteophytes spanning C3-C7, which exert some degree of mass effect on the proximal thoracic esophagus, and are candidate source of patient's reported dysphagia. There is some suggestion of asymmetric left eccentric soft tissue thickening at the origin of the thoracic esophagus, though this may represent anatomic distortion and apposed mucosal surfaces related to the ventral osteophytes. Endoscopy could be considered for further evaluation to rule out underlying mass. 4.  No enhancing mass along the aerodigestive tract.    Head CT w/o contrast    Result Date: 10/6/2023  EXAM: CT HEAD W/O CONTRAST LOCATION: [facility] DATE: 10/6/2023 INDICATION: dysphagia without foreign body COMPARISON: None. TECHNIQUE: Routine CT Head without IV contrast. Multiplanar reformats. Dose reduction techniques were used. FINDINGS: INTRACRANIAL CONTENTS: No intracranial hemorrhage, extraaxial collection, or mass effect.  No CT evidence of acute infarct. Normal parenchymal attenuation. Normal ventricles and sulci. Posterior fossa structures including cerebellar hemispheres, fourth ventricle and CP angle cisterns are clear. Nasopharynx is clear. Region of the sella and suprasellar cistern are clear. VISUALIZED ORBITS/SINUSES/MASTOIDS: No intraorbital abnormality. No paranasal sinus mucosal disease. No middle ear or mastoid effusion. BONES/SOFT TISSUES: No acute abnormality.     IMPRESSION: 1.  Normal head CT.    XR Neck Soft Tissue    Result Date: 10/6/2023  XR NECK SOFT TISSUE 10/6/2023 2:06 PM HISTORY: Oropharyngeal dysphagia; Throat swelling COMPARISON: None.     IMPRESSION: Anterior osteophytic changes in the lower cervical spine. Mild loss of vertebral body height, chronic appearing in the inferior cervical spine. Posterior element degenerative changes. Craniocervical junction unremarkable. No prevertebral edema. No significant thickening of the epiglottis. SHERRI BARAHONA MD   SYSTEM ID:  [id number]

## 2023-10-19 NOTE — LETTER
10/19/2023         RE: Martin Flaherty  [address] [facility] [address]        Dear Colleague,    Thank you for referring your patient, Martin Flaherty, to the [facility]. Please see a copy of my visit note below.    ASSESSMENT: Martin Flaherty is a 68 year old male presents for consultation at the request of PCP Stephania Laughlin, with past medical history significant for hypercholesterolemia, hypertension, tachycardia, morbid obesity, GERD, history atrial fibrillation, who presents today for new patient evaluation of :    -Several months of progressive dysphagia.  Large anterior osteophytes cervical spine from C3-C7 with mass effect on the thoracic esophagus and thickening of the esophagus.    Patient denies neck pain or arm pain.  Patient is neurologically intact on exam. No myelopathic or red flag symptoms.           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 Discussed diagnosis and treatment options with the patient today. A shared decision making model was used. The patient's values and choices were respected. The following represents what was discussed and decided upon by the provider and the patient.     -DIAGNOSTIC TESTS:  Images were personally reviewed and interpreted and explained to patient today using spine model.   -- Ordered cervical CT scan to further evaluate anterior osteophytes.  Unsure if  neurosurgery will need an MRI as well, will defer to their recommendation for this.  --CTA of the neck 10/6/2023 reviewed which shows large anterior osteophytes from C3-C7 with mass effect on the thoracic esophagus, thickening of the thoracic esophagus.    -Referral to [facility] neurosurgeon for surgical consult for dysphagia related to anterior cervical spine osteophytes.    -PHYSICAL THERAPY: No recommendations for physical therapy at this time as patient has no pain.      -MEDICATIONS:  No changes in medications as patient has no pain.    -INTERVENTIONS: No recommendations for injections as patient has no pain.    -PATIENT EDUCATION: Total time of 46 minutes, on the day of service, spent with the patient, reviewing the chart, placing orders, and documenting.     -FOLLOW-UP:   Follow-up for neurosurgical consult    Advised patient to call the Spine Center if symptoms worsen or you have problems controlling bladder and bowel function.   ______________________________________________________________________    SUBJECTIVE:   Martin Flaherty  is a 68 year old male who presents today for new patient evaluation of intermittent dysphagia with episode bringing him to the ER 10/6/2023 of feeling like he was unable to swallow.  Since then symptoms have improved and he does feel like he is able to swallow fine currently.  He does report phlegm on a regular basis however in his throat and wondering if this is related to his cervical spine as well.  Otherwise he denies pain at this point, currently pain is a 0/10, denies any prior cervical spine pain.  Denies arm pain.  Denies numbness or tingling sensations.  Denies upper extremity weakness.  Denies recent trips or falls or balance changes.  Denies bowel or bladder loss control.    -Treatment to Date: No prior spinal surgery or spinal injections    -Medications:  Medrol Dosepak prescribed 10/6/2023      Current Outpatient Medications   Medication     allopurinol  (ZYLOPRIM) 300 MG tablet     amLODIPine (NORVASC) 10 MG tablet     famotidine (PEPCID) 20 MG tablet     fenofibrate (TRICOR) 145 MG tablet     flecainide (TAMBOCOR) 50 MG tablet     lisinopril (ZESTRIL) 2.5 MG tablet     methylPREDNISolone (MEDROL DOSEPAK) 4 MG tablet therapy pack     omeprazole (PRILOSEC) 20 MG DR capsule     omeprazole (PRILOSEC) 40 MG DR capsule     simvastatin (ZOCOR) 40 MG tablet     No current facility-administered medications for this visit.       No Known Allergies    Past Medical History:   Diagnosis Date     Acid reflux disease      Atrial tachycardia      Benign essential hypertension      Chronic gout      History of atrial fibrillation     remote; not on AC     Hypertriglyceridemia      Obesity (BMI 30-39.9)      Pure hypercholesterolemia         Patient Active Problem List   Diagnosis     History of atrial fibrillation     Pure hypercholesterolemia     Hypertriglyceridemia     Benign essential hypertension     Obesity (BMI 30-39.9)     Chronic gout     Morbid obesity (H)     Atrial tachycardia     Acid reflux disease     Osteophyte of cervical spine       Past Surgical History:   Procedure Laterality Date     APPENDECTOMY            EP STUDY, MODIFIED  2/2011    Attempted ablation 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       Family History   Problem Relation Age of Onset     Cancer Father 48        metastatic at presentation; unknown primary     Myocardial Infarction Mother         later in life     Hyperlipidemia Brother         multiple brothers     Diabetes No family hx of      Cerebrovascular Disease No family hx of      Coronary Artery Disease Early Onset No family hx of      Colon Cancer No family hx of      Prostate Cancer No family hx of        Reviewed past medical, surgical, and family history with patient found on new patient intake packet located in EMR Media tab.     SOCIAL HX: Patient is  and retired.  Patient denies smoking/tobacco use.  Does report drinking  "alcohol 3-4 drinks a month.  Denies history being a heavy drinker.  Denies recreational drug use.    ROS: Positive for difficulty swallowing, reflux.  Specifically negative for bowel/bladder dysfunction, balance changes, headache, dizziness, foot drop, fevers, chills, appetite changes, nausea/vomiting, unexplained weight loss. Otherwise 13 systems reviewed are negative. Please see the patient's intake questionnaire from today for details.    OBJECTIVE:  /73 (BP Location: Right arm, Patient Position: Sitting)   Pulse 65   Ht 5' 9\" (1.753 m)   Wt 245 lb (111.1 kg)   SpO2 98%   BMI 36.18 kg/m      PHYSICAL EXAMINATION:  --CONSTITUTIONAL: Vital signs as above. No acute distress. The patient is well nourished and well groomed.  --PSYCHIATRIC: The patient is awake, alert, oriented to person, place, time and answering questions appropriately with clear speech. Appropriate mood and affect   --HEENT: Sclera are non-injected. Extraocular muscles are intact. Thyroid moves easily upon swallowing.  Moist oral mucosa.  --RESPIRATORY: Normal rhythm and effort. No abnormal accessory muscle breathing patterns noted.   --GROSS MOTOR: Easily arises from a seated position.   --CERVICAL SPINE: Inspection reveals no evidence of deformity. Range of motion is not limited in cervical flexion, extension, lateral rotation.   --UPPER EXTREMITY MOTOR TESTING:  Wrist flexion left 5/5, right 5/5  Wrist extension left 5/5, right 5/5  Pronators left 5/5, right 5/5  Biceps left 5/5, right 5/5   Triceps left 5/5, right 5/5   Shoulder abduction left 5/5, right 5/5   left 5/5, right 5/5  --NEUROLOGIC: CN III-XII are grossly intact. 2/4 symmetric biceps, brachioradialis, triceps reflexes bilaterally. Sensation to upper extremities is intact.  Negative Agee's bilaterally.    --VASCULAR: 2/4 radial pulses bilaterally. Warm upper limbs bilaterally. Capillary refill in the upper extremities is less than 1 second.    RESULTS: Prior medical " records from [facility] Everywhere were reviewed today.     Imaging:  Spine imaging was personally reviewed and interpreted today. The images were shown to the patient and the findings were explained using a spine model.       CTA Head Neck with Contrast    Result Date: 10/6/2023  EXAM: CTA HEAD NECK W CONTRAST LOCATION: [facility] DATE: 10/6/2023 INDICATION: dysphagia without foreign body COMPARISON: Head CT: 10/06/2023. CONTRAST: 67mL Isovue 370 TECHNIQUE: Head and neck CT angiogram with IV contrast. Axial helical CT images of the head and neck vessels obtained during the arterial phase of intravenous contrast administration. Axial 2D reconstructed images and multiplanar 3D MIP reconstructed images of the head and neck vessels were performed by the technologist. Dose reduction techniques were used. All stenosis measurements made according to NASCET criteria unless otherwise specified. FINDINGS: HEAD CTA: ANTERIOR CIRCULATION: No stenosis/occlusion, aneurysm, or high flow vascular malformation. Standard [place] anatomy. POSTERIOR CIRCULATION: No stenosis/occlusion, aneurysm, or high flow vascular malformation. Balanced vertebral arteries supply a normal basilar artery. DURAL VENOUS SINUSES: Expected enhancement of the major dural venous sinuses. NECK CTA: RIGHT CAROTID: No measurable stenosis or dissection. LEFT CAROTID: No measurable stenosis or dissection. VERTEBRAL ARTERIES: No focal stenosis or dissection. Balanced vertebral arteries. AORTIC ARCH: Classic aortic arch anatomy with no significant stenosis at the origin of the great vessels. NONVASCULAR STRUCTURES: Large protuberant ventrally directed osteophytes spanning C3-C7, which exert some degree of mass effect on the proximal thoracic esophagus. There is some suggestion of asymmetric left eccentric soft tissue thickening at the origin  of the thoracic esophagus (series 5/image 125). No enhancing mass along the  aerodigestive tract.     IMPRESSION: HEAD CTA: 1.  No substantial stenosis or occlusion involving the major intracranial arteries. 2.  No aneurysm. NECK CTA: 1.  No substantial stenosis or occlusion involving the major arteries of the neck. 2.  No evidence of dissection. 3.  Large protuberant ventrally directed osteophytes spanning C3-C7, which exert some degree of mass effect on the proximal thoracic esophagus, and are candidate source of patient's reported dysphagia. There is some suggestion of asymmetric left eccentric soft tissue thickening at the origin of the thoracic esophagus, though this may represent anatomic distortion and apposed mucosal surfaces related to the ventral osteophytes. Endoscopy could be considered for further evaluation to rule out underlying mass. 4.  No enhancing mass along the aerodigestive tract.    Head CT w/o contrast    Result Date: 10/6/2023  EXAM: CT HEAD W/O CONTRAST LOCATION: [facility] DATE: 10/6/2023 INDICATION: dysphagia without foreign body COMPARISON: None. TECHNIQUE: Routine CT Head without IV contrast. Multiplanar reformats. Dose reduction techniques were used. FINDINGS: INTRACRANIAL CONTENTS: No intracranial hemorrhage, extraaxial collection, or mass effect.  No CT evidence of acute infarct. Normal parenchymal attenuation. Normal ventricles and sulci. Posterior fossa structures including cerebellar hemispheres, fourth ventricle and CP angle cisterns are clear. Nasopharynx is clear. Region of the sella and suprasellar cistern are clear. VISUALIZED ORBITS/SINUSES/MASTOIDS: No intraorbital abnormality. No paranasal sinus mucosal disease. No middle ear or mastoid effusion. BONES/SOFT TISSUES: No acute abnormality.     IMPRESSION: 1.  Normal head CT.    XR Neck Soft Tissue    Result Date: 10/6/2023  XR NECK SOFT TISSUE 10/6/2023 2:06 PM HISTORY: Oropharyngeal dysphagia; Throat swelling COMPARISON: None.     IMPRESSION: Anterior osteophytic changes in the  lower cervical spine. Mild loss of vertebral body height, chronic appearing in the inferior cervical spine. Posterior element degenerative changes. Craniocervical junction unremarkable. No prevertebral edema. No significant thickening of the epiglottis. SHERRI BARAHONA MD   SYSTEM ID:  [id number]         Again, thank you for allowing me to participate in the care of your patient.        Sincerely,        Jayna Gold, CNP

## 2023-10-20 ENCOUNTER — ANCILLARY PROCEDURE (OUTPATIENT)
Dept: CT IMAGING | Facility: CLINIC | Age: 68
End: 2023-10-20
Attending: NURSE PRACTITIONER
Payer: MEDICARE

## 2023-10-20 DIAGNOSIS — M25.78 DEGENERATION OF INTERVERTEBRAL DISC OF CERVICAL REGION WITH OSTEOPHYTE OF CERVICAL VERTEBRA: ICD-10-CM

## 2023-10-20 DIAGNOSIS — R13.10 DYSPHAGIA, UNSPECIFIED TYPE: ICD-10-CM

## 2023-10-20 DIAGNOSIS — R13.19 MECHANICAL DYSPHAGIA: ICD-10-CM

## 2023-10-20 DIAGNOSIS — M50.30 DEGENERATION OF INTERVERTEBRAL DISC OF CERVICAL REGION WITH OSTEOPHYTE OF CERVICAL VERTEBRA: ICD-10-CM

## 2023-10-20 PROCEDURE — G1010 CDSM STANSON: HCPCS

## 2023-10-30 PROBLEM — E66.01 MORBID OBESITY (H): Status: RESOLVED | Noted: 2022-04-25 | Resolved: 2023-10-30

## 2023-10-30 PROBLEM — M25.78 OSTEOPHYTE OF CERVICAL SPINE: Status: RESOLVED | Noted: 2023-10-06 | Resolved: 2023-10-30

## 2023-10-31 ENCOUNTER — OFFICE VISIT (OUTPATIENT)
Dept: INTERNAL MEDICINE | Facility: CLINIC | Age: 68
End: 2023-10-31
Payer: MEDICARE

## 2023-10-31 VITALS
WEIGHT: 245.1 LBS | BODY MASS INDEX: 36.3 KG/M2 | HEIGHT: 69 IN | DIASTOLIC BLOOD PRESSURE: 76 MMHG | SYSTOLIC BLOOD PRESSURE: 119 MMHG | OXYGEN SATURATION: 96 % | TEMPERATURE: 98.6 F | HEART RATE: 73 BPM

## 2023-10-31 DIAGNOSIS — R73.01 IMPAIRED FASTING GLUCOSE: ICD-10-CM

## 2023-10-31 DIAGNOSIS — Z12.5 SCREENING FOR PROSTATE CANCER: ICD-10-CM

## 2023-10-31 DIAGNOSIS — Z23 NEED FOR PROPHYLACTIC VACCINATION AND INOCULATION AGAINST INFLUENZA: ICD-10-CM

## 2023-10-31 DIAGNOSIS — Z13.1 SCREENING FOR DIABETES MELLITUS: ICD-10-CM

## 2023-10-31 DIAGNOSIS — Z13.0 SCREENING FOR BLOOD DISEASE: ICD-10-CM

## 2023-10-31 DIAGNOSIS — E78.00 PURE HYPERCHOLESTEROLEMIA: ICD-10-CM

## 2023-10-31 DIAGNOSIS — Z00.00 MEDICARE ANNUAL WELLNESS VISIT, SUBSEQUENT: Primary | ICD-10-CM

## 2023-10-31 LAB
ALBUMIN SERPL BCG-MCNC: 4.3 G/DL (ref 3.5–5.2)
ALP SERPL-CCNC: 74 U/L (ref 40–129)
ALT SERPL W P-5'-P-CCNC: 27 U/L (ref 0–70)
ANION GAP SERPL CALCULATED.3IONS-SCNC: 10 MMOL/L (ref 7–15)
AST SERPL W P-5'-P-CCNC: 26 U/L (ref 0–45)
BILIRUB SERPL-MCNC: 0.4 MG/DL
BUN SERPL-MCNC: 15.9 MG/DL (ref 8–23)
CALCIUM SERPL-MCNC: 9.4 MG/DL (ref 8.8–10.2)
CHLORIDE SERPL-SCNC: 104 MMOL/L (ref 98–107)
CHOLEST SERPL-MCNC: 132 MG/DL
CREAT SERPL-MCNC: 1.37 MG/DL (ref 0.67–1.17)
DEPRECATED HCO3 PLAS-SCNC: 25 MMOL/L (ref 22–29)
EGFRCR SERPLBLD CKD-EPI 2021: 57 ML/MIN/1.73M2
ERYTHROCYTE [DISTWIDTH] IN BLOOD BY AUTOMATED COUNT: 12.2 % (ref 10–15)
GLUCOSE SERPL-MCNC: 114 MG/DL (ref 70–99)
HBA1C MFR BLD: 5.6 % (ref 0–5.6)
HCT VFR BLD AUTO: 42.4 % (ref 40–53)
HDLC SERPL-MCNC: 35 MG/DL
HGB BLD-MCNC: 14.4 G/DL (ref 13.3–17.7)
LDLC SERPL CALC-MCNC: 63 MG/DL
MCH RBC QN AUTO: 29.8 PG (ref 26.5–33)
MCHC RBC AUTO-ENTMCNC: 34 G/DL (ref 31.5–36.5)
MCV RBC AUTO: 88 FL (ref 78–100)
NONHDLC SERPL-MCNC: 97 MG/DL
PLATELET # BLD AUTO: 173 10E3/UL (ref 150–450)
POTASSIUM SERPL-SCNC: 3.7 MMOL/L (ref 3.4–5.3)
PROT SERPL-MCNC: 7.2 G/DL (ref 6.4–8.3)
PSA SERPL DL<=0.01 NG/ML-MCNC: 1.51 NG/ML (ref 0–4.5)
RBC # BLD AUTO: 4.84 10E6/UL (ref 4.4–5.9)
SODIUM SERPL-SCNC: 139 MMOL/L (ref 135–145)
TRIGL SERPL-MCNC: 168 MG/DL
WBC # BLD AUTO: 6 10E3/UL (ref 4–11)

## 2023-10-31 PROCEDURE — 36415 COLL VENOUS BLD VENIPUNCTURE: CPT | Performed by: INTERNAL MEDICINE

## 2023-10-31 PROCEDURE — 90662 IIV NO PRSV INCREASED AG IM: CPT | Performed by: INTERNAL MEDICINE

## 2023-10-31 PROCEDURE — 83036 HEMOGLOBIN GLYCOSYLATED A1C: CPT | Performed by: INTERNAL MEDICINE

## 2023-10-31 PROCEDURE — 80053 COMPREHEN METABOLIC PANEL: CPT | Performed by: INTERNAL MEDICINE

## 2023-10-31 PROCEDURE — G0103 PSA SCREENING: HCPCS | Performed by: INTERNAL MEDICINE

## 2023-10-31 PROCEDURE — G0439 PPPS, SUBSEQ VISIT: HCPCS | Performed by: INTERNAL MEDICINE

## 2023-10-31 PROCEDURE — G0008 ADMIN INFLUENZA VIRUS VAC: HCPCS | Performed by: INTERNAL MEDICINE

## 2023-10-31 PROCEDURE — 80061 LIPID PANEL: CPT | Performed by: INTERNAL MEDICINE

## 2023-10-31 PROCEDURE — 85027 COMPLETE CBC AUTOMATED: CPT | Performed by: INTERNAL MEDICINE

## 2023-10-31 ASSESSMENT — ENCOUNTER SYMPTOMS
DYSURIA: 0
DIARRHEA: 0
WEAKNESS: 0
MYALGIAS: 0
HEMATURIA: 0
NERVOUS/ANXIOUS: 0
EYE PAIN: 0
FEVER: 0
JOINT SWELLING: 0
SHORTNESS OF BREATH: 0
SORE THROAT: 0
COUGH: 0
CONSTIPATION: 0
FREQUENCY: 0
DIZZINESS: 0
HEADACHES: 0
PALPITATIONS: 0
HEARTBURN: 1
CHILLS: 0
PARESTHESIAS: 0
ABDOMINAL PAIN: 0
NAUSEA: 0
ARTHRALGIAS: 0
HEMATOCHEZIA: 0

## 2023-10-31 ASSESSMENT — ACTIVITIES OF DAILY LIVING (ADL): CURRENT_FUNCTION: NO ASSISTANCE NEEDED

## 2023-10-31 NOTE — PROGRESS NOTES
ASSESSMENT/PLAN                                                       (Z00.00) Medicare annual wellness visit, subsequent  (primary encounter diagnosis)  Comment: PMH, PSH, FH, SH, medications, allergies, immunizations, and preventative health measures reviewed and updated as appropriate.  Plan: see below for plans.      (E78.00) Pure hypercholesterolemia  (Z13.1) Screening for diabetes mellitus  (Z12.5) Screening for prostate cancer  (R73.01) Impaired fasting glucose  (Z13.0) Screening for blood disease  Plan: non-fasting labs today.     (Z23) Need for prophylactic vaccination and inoculation against influenza  Plan: flu shot given today.     Appropriate preventive services were discussed with this patient, including applicable screening as appropriate for cardiovascular disease, diabetes, osteopenia/osteoporosis, and glaucoma.  As appropriate for age/gender, discussed screening for colorectal cancer, prostate cancer, breast cancer, and cervical cancer. Checklist reviewing preventive services available has been given to the patient.    Reviewed patients plan of care. The Basic Care Plan (routine screening as documented in Health Maintenance) for Martin Flaherty meets the Care Plan requirement. This Care Plan has been established and reviewed with the Patient.    Stephania Laughlin MD   [address] [address]  T: [phone], F: [phone]    SUBJECTIVE                                                      Martin Flaherty is a very pleasant 68 year old male who presents for his subsequent AWV:    Current providers (other than myself): Manny (cardiology)    PMH, PSH, FH, SH, medications, allergies, immunizations, preventative health, and health risk assessment reviewed.     Past Medical History:   Diagnosis Date    Acid reflux disease     Atrial tachycardia     Benign essential hypertension     Chronic gout     History of atrial fibrillation     remote; not on AC     Hypertriglyceridemia     Obesity (BMI 30-39.9)     Pure hypercholesterolemia      Past Surgical History:   Procedure Laterality Date    APPENDECTOMY           EP STUDY, MODIFIED  2/2011    Attempted ablation 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     Family History   Problem Relation Age of Onset    Myocardial Infarction Mother         later in life    Cancer Father 48        metastatic at presentation; unknown primary    Hyperlipidemia Brother         multiple brothers    Diabetes No family hx of     Cerebrovascular Disease No family hx of     Coronary Artery Disease Early Onset No family hx of     Colon Cancer No family hx of     Prostate Cancer No family hx of      Social History     Occupational History    Occupation: Retired - product support     Employer: [employer]   Tobacco Use    Smoking status: Never    Smokeless tobacco: Never   Vaping Use    Vaping Use: Never used   Substance and Sexual Activity    Alcohol use: Not Currently    Drug use: No    Sexual activity: Yes     Partners: Female   Social History Narrative    .    3 adult children.    No grand children (as of 2022)    Walks daily.     No Known Allergies    Current Outpatient Medications   Medication Sig    allopurinol (ZYLOPRIM) 300 MG tablet TAKE 1 TABLET BY MOUTH EVERY DAY    amLODIPine (NORVASC) 10 MG tablet Take 1 tablet (10 mg) by mouth daily    famotidine (PEPCID) 20 MG tablet Take 1 tablet (20 mg) by mouth 2 times daily as needed (stomach upset/acid reflux)    fenofibrate (TRICOR) 145 MG tablet Take 1 tablet (145 mg) by mouth daily    flecainide (TAMBOCOR) 50 MG tablet Take 1.5 tablets (75 mg) by mouth 2 times daily    lisinopril (ZESTRIL) 2.5 MG tablet Take 1 tablet (2.5 mg) by mouth daily    methylPREDNISolone (MEDROL DOSEPAK) 4 MG tablet therapy pack Follow package instructions    omeprazole (PRILOSEC) 20 MG DR capsule Take 1 capsule (20 mg) by mouth daily    omeprazole (PRILOSEC) 40 MG DR capsule Take 1 capsule (40 mg) by  "mouth daily    simvastatin (ZOCOR) 40 MG tablet Take 1 tablet (40 mg) by mouth At Bedtime Appointment required for further refills     Immunization History   Administered Date(s) Administered    COVID-19 Bivalent 18+ (Moderna) 12/12/2022    COVID-19 MONOVALENT 12+ (Pfizer) 10/26/2021    COVID-19 Vaccine (Chan) 03/08/2021    Influenza (IIV3) PF 11/14/1997    Influenza Vaccine 18-64 (Flublok) 03/17/2020, 10/15/2020    Influenza Vaccine, 6+MO IM (QUADRIVALENT W/PRESERVATIVES) 10/21/2015, 10/19/2018    TD,PF 7+ (Tenivac) 07/07/1998, 12/02/2010    TDAP (Adacel,Boostrix) 12/29/2020    Zoster recombinant adjuvanted (SHINGRIX) 09/02/2020, 12/18/2020    Zoster vaccine, live 11/12/2016     PREVENTATIVE HEALTH                                                      BMI: obese  Blood pressure: well-controlled on current regimen   Prostate CA Screening: DUE  Colon CA screening: up to date   Lung CA screening: n/a   AAA screening: n/a   Screening cholesterol: n/a - already being treated for this condition  Screening diabetes: DUE  Alcohol misuse screening: alcohol use reviewed - no intervention indicated at this time  Immunizations: reviewed;  flu shot DUE and COVID-19 booster DUE - patient declines    HEALTH RISK ASSESSMENT                                                      In general, how would you rate your overall physical health? good  Outside of work, how many days during the week do you exercise? 4-5 days/week  Outside of work, approximately how many minutes a day do you exercise? 30-45 minutes    If you drink alcohol do you typically have >3 drinks per day or >7 drinks per week? No  Do you usually eat at least 4 servings of fruit and vegetables a day, include whole grains & fiber and avoid regularly eating high fat or \"junk\" foods? No     Do you have any problems taking medications regularly? No  Do you have any side effects from medications? No    Assistance with daily activities: No    Safety concerns: No    Fall risk " "assessment: completed today (see ambulatory assessments)    Hearing concerns: YES - difficulty following a conversation in a noisy restaurant or crowded room, difficulty following dialogue in the theater, find that men's voices are easier to understand than woman's, difficulty understanding soft or whispered speech    In the past 6 months, have you been bothered by leaking of urine: No    In general, how would you rate your overall mental or emotional health: excellent    Do you have a current opioid prescription? No  Do you use any other controlled substances or medications that are not prescribed by a provider? None    PHQ-2/PHQ-9 assessment: completed today (see ambulatory assessments)    Additional concerns today: No    OBJECTIVE                                                      /76   Pulse 73   Temp 98.6  F (37  C) (Temporal)   Ht 1.753 m (5' 9\")   Wt 111.2 kg (245 lb 1.6 oz)   SpO2 96%   BMI 36.19 kg/m    Constitutional: well-appearing  Head, Ears, and Eyes: normocephalic; normal external auditory canal and pinna; tympanic membranes visualized and normal; normal lids and conjunctivae  Neck: supple, symmetric, no thyromegaly or lymphadenopathy  Respiratory: normal respiratory effort; clear to auscultation bilaterally  Cardiovascular: regular rate and rhythm; no edema  Gastrointestinal: soft, non-tender, and non-distended; no organomegaly or masses  Musculoskeletal: normal gait and station  Psych: normal judgment and insight; normal mood and affect; recent and remote memory intact    Cognitive Impairment noted: No    ---    (Note was completed, in part, with [employer] voice-recognition software. Documentation was reviewed, but some grammatical, spelling, and word errors may remain.)    "

## 2023-10-31 NOTE — COMMUNITY RESOURCES LIST (ENGLISH)
10/31/2023   [facility]  N/A  For questions about this resource list or additional care needs, please contact your primary care clinic or care manager.  Phone: [phone]   Email: N/A   Address: [address] [place] [address]   Hours: N/A        Financial Stability       Utility payment assistance  1  [facility] Distance: 1.59 miles      In-Person, Phone/Virtual   [address] [place] [address]  Language: English, Frisian  Hours: Mon - Fri 9:00 AM - 4:30 PM  Fees: Free   Phone: (864) 796-6530 Ext.113 Email: info@[email or web address].[employer] Interactive Website: [email or web address]     2  [facility] - Utility payment assistance Distance: 7.53 miles      In-Person, Phone/Virtual   [address] [place] [address]  Language: English  Hours: Mon - Thu 9:00 AM - 3:00 PM  Fees: Free   Phone: (286) 271-8895 Email: [email or web address] Website: http://[email or web address]/care-ministries/          Important Numbers & Websites       Emergency Services   911  [facility] Services   311  Poison Control   (841) 637-6559  Suicide Prevention Lifeline   (705) 857-5782 (TALK)  Child Abuse Hotline   (367) 309-9461 (4-A-Child)  Sexual Assault Hotline   (581) 380-2791 (HOPE)  National Runaway Safeline   (283) 502-6365 (RUNAWAY)  All-Options Talkline   (630) 145-3699  Substance Abuse Referral   (585) 785-5979 (HELP)

## 2023-10-31 NOTE — COMMUNITY RESOURCES LIST (ENGLISH)
10/31/2023   [facility] Out[facility]  N/A  For additional resource needs, please contact your health insurance member services or your primary care team.  Phone: [phone]   Email: N/A   Address: [facility] [place] [address]   Hours: N/A        Financial Stability       Utility payment assistance  1  [place] [facility] Distance: 14.23 miles      In-Person, Phone/Virtual   [address] [place] [address]  Language: English  Hours: Mon - Fri 8:30 AM - 4:30 PM  Fees: Free   Phone: (251) 147-4462 Website: [place]://[place]/[employer] ([employer])/energy/consumer-assistance/energy-assistance-program/     2  [place] [facility] [employer] [facility] - [place]'s Telephone Assistance Plan (TAP) and [facility] Lifeline and Affordable Connectivity Program (ACP) Distance: 20.41 miles      Phone/Virtual   [address] [place] [address]  Language: English  Fees: Free   Phone: (176) 817-6497 Email: tiffanie.[email or web address].[email or web address]. Website: https://[place]/puc/consumers/telephone/          Important Numbers & Websites       [facility]   211 211unitedway.org  Poison Control   (524) 922-6222 [place]po[place]  Suicide and Crisis Lifeline   [address] [place], [place] [address]line.org  Childhelp [place] Child Abuse Hotline   962.855.8203 Childhelphotline.org  National Sexual Assault Hotline   (958) 676-9885 (HOPE) Rainn.org  National Runaway Safeline   (948) 149-2623 (RUNAWAY) 1800runaway.org  Pregnancy & Postpartum Support [place]   Call/text 855-929-1188 Ppsupportmn.org  Substance Abuse National Helpline ([facility]   880-139-HELP (1839) Findtreatment.gov  Emergency Services   911

## 2023-11-27 ENCOUNTER — HOSPITAL ENCOUNTER (OUTPATIENT)
Dept: GENERAL RADIOLOGY | Facility: CLINIC | Age: 68
Discharge: HOME | End: 2023-11-27
Attending: INTERNAL MEDICINE | Admitting: INTERNAL MEDICINE
Payer: MEDICARE

## 2023-11-27 DIAGNOSIS — R13.19 ESOPHAGEAL DYSPHAGIA: ICD-10-CM

## 2023-11-27 DIAGNOSIS — R13.19 ESOPHAGEAL DYSPHAGIA: Primary | ICD-10-CM

## 2023-11-27 PROCEDURE — 74221 X-RAY XM ESOPHAGUS 2CNTRST: CPT

## 2023-11-27 PROCEDURE — 250N000013 HC RX MED GY IP 250 OP 250 PS 637: Performed by: INTERNAL MEDICINE

## 2023-11-27 RX ADMIN — ANTACID/ANTIFLATULENT 4 G: 380; 550; 10; 10 GRANULE, EFFERVESCENT ORAL at 13:39

## 2023-12-05 NOTE — CONFIDENTIAL NOTE
NEUROSURGERY- NEW PREVISIT PLANNING       Record Status/Location     Referring Provider Referral Jayna Gold CNP    Diagnosis Referral M50.30, M25.78 (ICD-10-CM) - Degeneration of intervertebral disc of cervical region with osteophyte of cervical vertebra   R13.10 (ICD-10-CM) - Dysphagia, unspecified type   R13.19 (ICD-10-CM) - Mechanical dysphagia      MRI (HEAD, NECK, SPINE) Na    [facility]   X-ray Pacs Cervical 10/6/23 [facility]   INJECTION Na    PHYSICAL THERAPY Na    SURGERY Na

## 2023-12-08 ENCOUNTER — TRANSFERRED RECORDS (OUTPATIENT)
Dept: HEALTH INFORMATION MANAGEMENT | Facility: CLINIC | Age: 68
End: 2023-12-08
Payer: MEDICARE

## 2023-12-12 ENCOUNTER — OFFICE VISIT (OUTPATIENT)
Dept: CARDIOLOGY | Facility: CLINIC | Age: 68
End: 2023-12-12
Payer: MEDICARE

## 2023-12-12 VITALS
BODY MASS INDEX: 35.69 KG/M2 | SYSTOLIC BLOOD PRESSURE: 108 MMHG | HEIGHT: 70 IN | WEIGHT: 249.3 LBS | OXYGEN SATURATION: 96 % | HEART RATE: 63 BPM | DIASTOLIC BLOOD PRESSURE: 72 MMHG

## 2023-12-12 DIAGNOSIS — I10 BENIGN ESSENTIAL HYPERTENSION: ICD-10-CM

## 2023-12-12 DIAGNOSIS — I47.10 PAROXYSMAL SUPRAVENTRICULAR TACHYCARDIA (H): Primary | ICD-10-CM

## 2023-12-12 PROBLEM — E66.01 CLASS 2 SEVERE OBESITY DUE TO EXCESS CALORIES WITH SERIOUS COMORBIDITY IN ADULT (H): Status: ACTIVE | Noted: 2023-12-12

## 2023-12-12 PROBLEM — E66.812 CLASS 2 SEVERE OBESITY DUE TO EXCESS CALORIES WITH SERIOUS COMORBIDITY IN ADULT (H): Status: ACTIVE | Noted: 2023-12-12

## 2023-12-12 PROCEDURE — 99214 OFFICE O/P EST MOD 30 MIN: CPT | Performed by: INTERNAL MEDICINE

## 2023-12-12 PROCEDURE — 93000 ELECTROCARDIOGRAM COMPLETE: CPT | Performed by: INTERNAL MEDICINE

## 2023-12-12 RX ORDER — FENOFIBRIC ACID 135 MG/1
135 CAPSULE, DELAYED RELEASE ORAL DAILY
COMMUNITY

## 2023-12-12 RX ORDER — FLECAINIDE ACETATE 50 MG/1
75 TABLET ORAL 2 TIMES DAILY
Qty: 270 TABLET | Refills: 3 | Status: SHIPPED | OUTPATIENT
Start: 2023-12-12 | End: 2024-09-23

## 2023-12-12 NOTE — PROGRESS NOTES
PHYSICIAN NOTE:  This visit was completed in person at the [facility].      I had the pleasure of seeing Mr. Martin Flaherty follow-up of atrial tachycardia/atrial fibrillation. Previously a patient of Dr. Zain Forman. This the first time that I meet this patient.    The patient is a 68-year-old male with the following medical issues:  Episode of atrial fibrillation apparently documented in [place] in 2002. No recent documented recurrence.  Paroxysmal atrial tachycardia. The patient has taken flecainide for many years with good AT suppression.  Hypertension.  CKD.  Dyslipidemia.  Gout.    The patient is doing well. He informed me that he takes all 3 tablets of flecainide 50 mg in the morning. This schedule works well for him. He says that, otherwise, the 50 mg tablets become destroyed when he tries to split them up. He has rare episodes of tachycardia, perhaps once per year.    No chest pain with exertion, orthopnea or PND, syncope or near syncope or other cardiac symptoms.      PHYSICAL EXAMINATION:  Vital signs: 108/72, 63, 113 kg, BMI 35.7  General: very pleasant gentlemen, in no apparent distress  ENT/Mouth:  no nasal discharge.  Eyes:  normal conjunctivae.   Neck:  no thyromegaly.  Chest/Lungs:  patient is not dyspneic.  Lungs CTA, without rales or wheezing  Cardiovascular: normal JVP, rhythm is regular.  No gallop, murmur or rub.    Abdomen:  no abdominal distention.    Extremities:  no edema  Skin:  no xanthelasma.    Neurologic:  alert & oriented x 3.    Vascular:  2+ carotids without bruits.        DIAGNOSTIC STUDIES:  Laboratory studies: creatinine 1.37, potassium 3.7, sodium 139, cholesterol 132, HDL 35, LDL 63  ECG: sinus rhythm, borderline ST abnormality, WNL  Stress echocardiogram: baseline EF 55 - 60%. Exercised x 9.5 minutes on Mtat protocol. Normal stress echocardiogram.      IMPRESSION:  Paroxysmal atrial tachycardia. Well suppressed on flecainide. ECG looks fine. He takes  flecainide 150 mg once daily (instead of 75 mg BID) but this schedule seems to be working well for him, there is no immediate need to make changes.  Reported episode of AF documented in 2002. No documented AF since then. Given the minimal AF burden it is reasonable to withhold anticoagulation for now.  Hypertension. Well controlled.    RECOMMENDATIONS:  Flecainide was refilled.  Follow-up in two years, sooner if she experiences issues in the interim.    It was my pleasure seeing this delightful patient.  Please feel free to call with any questions.   Total time spent today, including time for chart review and documentation, was 30 minutes.    Samuel Castaneda MD, [facility]      (Chart documentation was completed, in part, using Dragon voice-recognition software. The note was reviewed, however grammatical and spelling errors may be present.)        Orders Placed This Encounter   Procedures    EKG 12-lead complete w/read - Clinics (performed today)       Orders Placed This Encounter   Medications    Choline Fenofibrate (FENOFIBRIC ACID) 135 MG CPDR     Sig: Take 135 mg by mouth daily       Medications Discontinued During This Encounter   Medication Reason    omeprazole (PRILOSEC) 20 MG DR capsule Therapy completed (No AVS)         Encounter Diagnosis   Name Primary?    A-fib (H) Yes       CURRENT MEDICATIONS:  Current Outpatient Medications   Medication Sig Dispense Refill    allopurinol (ZYLOPRIM) 300 MG tablet TAKE 1 TABLET BY MOUTH EVERY DAY 90 tablet 0    amLODIPine (NORVASC) 10 MG tablet Take 1 tablet (10 mg) by mouth daily 90 tablet 3    Choline Fenofibrate (FENOFIBRIC ACID) 135 MG CPDR Take 135 mg by mouth daily      fenofibrate (TRICOR) 145 MG tablet Take 1 tablet (145 mg) by mouth daily 90 tablet 3    flecainide (TAMBOCOR) 50 MG tablet Take 1.5 tablets (75 mg) by mouth 2 times daily 270 tablet 0    lisinopril (ZESTRIL) 2.5 MG tablet Take 1 tablet (2.5 mg) by mouth daily 90 tablet 3    omeprazole (PRILOSEC) 40 MG  DR capsule Take 1 capsule (40 mg) by mouth daily 30 capsule 0    simvastatin (ZOCOR) 40 MG tablet Take 1 tablet (40 mg) by mouth At Bedtime Appointment required for further refills 90 tablet 3    famotidine (PEPCID) 20 MG tablet Take 1 tablet (20 mg) by mouth 2 times daily as needed (stomach upset/acid reflux) (Patient not taking: Reported on 12/12/2023) 60 tablet 3       ALLERGIES   No Known Allergies    PAST MEDICAL HISTORY:  Past Medical History:   Diagnosis Date    Acid reflux disease     Atrial tachycardia     Benign essential hypertension     Chronic gout     History of atrial fibrillation     remote; not on AC    Hypertriglyceridemia     Obesity (BMI 30-39.9)     Pure hypercholesterolemia        PAST SURGICAL HISTORY:  Past Surgical History:   Procedure Laterality Date    APPENDECTOMY           EP STUDY, MODIFIED  2/2011    Attempted ablation 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       FAMILY HISTORY:  Family History   Problem Relation Age of Onset    Myocardial Infarction Mother         later in life    Cancer Father 48        metastatic at presentation; unknown primary    Hyperlipidemia Brother         multiple brothers    Diabetes No family hx of     Cerebrovascular Disease No family hx of     Coronary Artery Disease Early Onset No family hx of     Colon Cancer No family hx of     Prostate Cancer No family hx of        SOCIAL HISTORY:  Social History     Socioeconomic History    Marital status:      Spouse name: Lesley    Number of children: 3    Years of education: 14    Highest education level: None   Occupational History    Occupation: Retired - product support     Employer: [employer]   Tobacco Use    Smoking status: Never    Smokeless tobacco: Never   Vaping Use    Vaping Use: Never used   Substance and Sexual Activity    Alcohol use: Not Currently    Drug use: No    Sexual activity: Yes     Partners: Female   Other Topics Concern    Caffeine Concern No    Sleep Concern No    Stress  "Concern No    Weight Concern No    Special Diet No    Exercise Yes     Comment: walks daily   Social History Narrative    .    3 adult children.    No grand children (as of 2023)    Walks daily.     Social Determinants of Health     Financial Resource Strain: High Risk (10/31/2023)    Financial Resource Strain     Within the past 12 months, have you or your family members you live with been unable to get utilities (heat, electricity) when it was really needed?: Yes   Food Insecurity: Low Risk  (10/31/2023)    Food Insecurity     Within the past 12 months, did you worry that your food would run out before you got money to buy more?: No     Within the past 12 months, did the food you bought just not last and you didn t have money to get more?: No   Transportation Needs: Low Risk  (10/31/2023)    Transportation Needs     Within the past 12 months, has lack of transportation kept you from medical appointments, getting your medicines, non-medical meetings or appointments, work, or from getting things that you need?: No   Interpersonal Safety: Low Risk  (10/31/2023)    Interpersonal Safety     Do you feel physically and emotionally safe where you currently live?: Yes     Within the past 12 months, have you been hit, slapped, kicked or otherwise physically hurt by someone?: No     Within the past 12 months, have you been humiliated or emotionally abused in other ways by your partner or ex-partner?: No   Housing Stability: Low Risk  (10/31/2023)    Housing Stability     Do you have housing? : Yes     Are you worried about losing your housing?: No       Review of Systems:  Skin:          Eyes:         ENT:         Respiratory:          Cardiovascular:         Gastroenterology:        Genitourinary:         Musculoskeletal:         Neurologic:         Psychiatric:         Heme/Lymph/Imm:         Endocrine:           Physical Exam:  Vitals: /72   Pulse 63   Ht 1.778 m (5' 10\")   Wt 113.1 kg (249 lb 4.8 oz)   " SpO2 96%   BMI 35.77 kg/m      Constitutional:           Skin:             Head:           Eyes:           Lymph:      ENT:           Neck:           Respiratory:            Cardiac:                                                           GI:           Extremities and Muscular Skeletal:                 Neurological:           Psych:           CC  Fatuma Poe MD  [facility]  [address] [place]  [place] [address]

## 2023-12-12 NOTE — LETTER
12/12/2023    Stephania Laughlin MD  [address] [facility] [address]    RE: Martin Flaherty       Dear Colleague,     I had the pleasure of seeing Martin Flaherty in the [facility].  PHYSICIAN NOTE:  This visit was completed in person at the [facility].      I had the pleasure of seeing Mr. Martin Flaherty follow-up of atrial tachycardia/atrial fibrillation. Previously a patient of Dr. Zain Forman. This the first time that I meet this patient.    The patient is a 68-year-old male with the following medical issues:  Episode of atrial fibrillation apparently documented in [place] in 2002. No recent documented recurrence.  Paroxysmal atrial tachycardia. The patient has taken flecainide for many years with good AT suppression.  Hypertension.  CKD.  Dyslipidemia.  Gout.    The patient is doing well. He informed me that he takes all 3 tablets of flecainide 50 mg in the morning. This schedule works well for him. He says that, otherwise, the 50 mg tablets become destroyed when he tries to split them up. He has rare episodes of tachycardia, perhaps once per year.    No chest pain with exertion, orthopnea or PND, syncope or near syncope or other cardiac symptoms.      PHYSICAL EXAMINATION:  Vital signs: 108/72, 63, 113 kg, BMI 35.7  General: very pleasant gentlemen, in no apparent distress  ENT/Mouth:  no nasal discharge.  Eyes:  normal conjunctivae.   Neck:  no thyromegaly.  Chest/Lungs:  patient is not dyspneic.  Lungs CTA, without rales or wheezing  Cardiovascular: normal JVP, rhythm is regular.  No gallop, murmur or rub.    Abdomen:  no abdominal distention.    Extremities:  no edema  Skin:  no xanthelasma.    Neurologic:  alert & oriented x 3.    Vascular:  2+ carotids without bruits.        DIAGNOSTIC STUDIES:  Laboratory studies: creatinine 1.37, potassium 3.7, sodium 139, cholesterol 132, HDL 35, LDL 63  ECG: sinus rhythm, borderline ST abnormality, WNL  Stress  echocardiogram: baseline EF 55 - 60%. Exercised x 9.5 minutes on Matt protocol. Normal stress echocardiogram.      IMPRESSION:  Paroxysmal atrial tachycardia. Well suppressed on flecainide. ECG looks fine. He takes flecainide 150 mg once daily (instead of 75 mg BID) but this schedule seems to be working well for him, there is no immediate need to make changes.  Reported episode of AF documented in 2002. No documented AF since then. Given the minimal AF burden it is reasonable to withhold anticoagulation for now.  Hypertension. Well controlled.    RECOMMENDATIONS:  Flecainide was refilled.  Follow-up in two years, sooner if she experiences issues in the interim.    It was my pleasure seeing this delightful patient.  Please feel free to call with any questions.   Total time spent today, including time for chart review and documentation, was 30 minutes.    Samuel Castaneda MD, [facility]      (Chart documentation was completed, in part, using Dragon voice-recognition software. The note was reviewed, however grammatical and spelling errors may be present.)        Orders Placed This Encounter   Procedures    EKG 12-lead complete w/read - Clinics (performed today)       Orders Placed This Encounter   Medications    Choline Fenofibrate (FENOFIBRIC ACID) 135 MG CPDR     Sig: Take 135 mg by mouth daily       Medications Discontinued During This Encounter   Medication Reason    omeprazole (PRILOSEC) 20 MG DR capsule Therapy completed (No AVS)         Encounter Diagnosis   Name Primary?    A-fib (H) Yes       CURRENT MEDICATIONS:  Current Outpatient Medications   Medication Sig Dispense Refill    allopurinol (ZYLOPRIM) 300 MG tablet TAKE 1 TABLET BY MOUTH EVERY DAY 90 tablet 0    amLODIPine (NORVASC) 10 MG tablet Take 1 tablet (10 mg) by mouth daily 90 tablet 3    Choline Fenofibrate (FENOFIBRIC ACID) 135 MG CPDR Take 135 mg by mouth daily      fenofibrate (TRICOR) 145 MG tablet Take 1 tablet (145 mg) by mouth daily 90 tablet 3     flecainide (TAMBOCOR) 50 MG tablet Take 1.5 tablets (75 mg) by mouth 2 times daily 270 tablet 0    lisinopril (ZESTRIL) 2.5 MG tablet Take 1 tablet (2.5 mg) by mouth daily 90 tablet 3    omeprazole (PRILOSEC) 40 MG DR capsule Take 1 capsule (40 mg) by mouth daily 30 capsule 0    simvastatin (ZOCOR) 40 MG tablet Take 1 tablet (40 mg) by mouth At Bedtime Appointment required for further refills 90 tablet 3    famotidine (PEPCID) 20 MG tablet Take 1 tablet (20 mg) by mouth 2 times daily as needed (stomach upset/acid reflux) (Patient not taking: Reported on 12/12/2023) 60 tablet 3       ALLERGIES   No Known Allergies    PAST MEDICAL HISTORY:  Past Medical History:   Diagnosis Date    Acid reflux disease     Atrial tachycardia     Benign essential hypertension     Chronic gout     History of atrial fibrillation     remote; not on AC    Hypertriglyceridemia     Obesity (BMI 30-39.9)     Pure hypercholesterolemia        PAST SURGICAL HISTORY:  Past Surgical History:   Procedure Laterality Date    APPENDECTOMY           EP STUDY, MODIFIED  2/2011    Attempted ablation 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       FAMILY HISTORY:  Family History   Problem Relation Age of Onset    Myocardial Infarction Mother         later in life    Cancer Father 48        metastatic at presentation; unknown primary    Hyperlipidemia Brother         multiple brothers    Diabetes No family hx of     Cerebrovascular Disease No family hx of     Coronary Artery Disease Early Onset No family hx of     Colon Cancer No family hx of     Prostate Cancer No family hx of        SOCIAL HISTORY:  Social History     Socioeconomic History    Marital status:      Spouse name: Lesley    Number of children: 3    Years of education: 14    Highest education level: None   Occupational History    Occupation: Retired - product support     Employer: [employer]   Tobacco Use    Smoking status: Never    Smokeless tobacco: Never   Vaping Use    Vaping  Use: Never used   Substance and Sexual Activity    Alcohol use: Not Currently    Drug use: No    Sexual activity: Yes     Partners: Female   Other Topics Concern    Caffeine Concern No    Sleep Concern No    Stress Concern No    Weight Concern No    Special Diet No    Exercise Yes     Comment: walks daily   Social History Narrative    .    3 adult children.    No grand children (as of 2023)    Walks daily.     Social Determinants of Health     Financial Resource Strain: High Risk (10/31/2023)    Financial Resource Strain     Within the past 12 months, have you or your family members you live with been unable to get utilities (heat, electricity) when it was really needed?: Yes   Food Insecurity: Low Risk  (10/31/2023)    Food Insecurity     Within the past 12 months, did you worry that your food would run out before you got money to buy more?: No     Within the past 12 months, did the food you bought just not last and you didn t have money to get more?: No   Transportation Needs: Low Risk  (10/31/2023)    Transportation Needs     Within the past 12 months, has lack of transportation kept you from medical appointments, getting your medicines, non-medical meetings or appointments, work, or from getting things that you need?: No   Interpersonal Safety: Low Risk  (10/31/2023)    Interpersonal Safety     Do you feel physically and emotionally safe where you currently live?: Yes     Within the past 12 months, have you been hit, slapped, kicked or otherwise physically hurt by someone?: No     Within the past 12 months, have you been humiliated or emotionally abused in other ways by your partner or ex-partner?: No   Housing Stability: Low Risk  (10/31/2023)    Housing Stability     Do you have housing? : Yes     Are you worried about losing your housing?: No       Review of Systems:  Skin:          Eyes:         ENT:         Respiratory:          Cardiovascular:         Gastroenterology:        Genitourinary:        "  Musculoskeletal:         Neurologic:         Psychiatric:         Heme/Lymph/Imm:         Endocrine:           Physical Exam:  Vitals: /72   Pulse 63   Ht 1.778 m (5' 10\")   Wt 113.1 kg (249 lb 4.8 oz)   SpO2 96%   BMI 35.77 kg/m      Constitutional:           Skin:             Head:           Eyes:           Lymph:      ENT:           Neck:           Respiratory:            Cardiac:                                                           GI:           Extremities and Muscular Skeletal:                 Neurological:           Psych:           CC  Fatuma Poe MD  [facility]  [address] [place] [address]                  Thank you for allowing me to participate in the care of your patient.      Sincerely,     Samuel Castaneda MD     [facility]    "

## 2023-12-14 ENCOUNTER — TELEPHONE (OUTPATIENT)
Dept: NEUROSURGERY | Facility: CLINIC | Age: 68
End: 2023-12-14
Payer: MEDICARE

## 2023-12-15 ENCOUNTER — PRE VISIT (OUTPATIENT)
Dept: NEUROSURGERY | Facility: CLINIC | Age: 68
End: 2023-12-15

## 2023-12-21 ENCOUNTER — TRANSFERRED RECORDS (OUTPATIENT)
Dept: HEALTH INFORMATION MANAGEMENT | Facility: CLINIC | Age: 68
End: 2023-12-21
Payer: MEDICARE

## 2024-04-05 ENCOUNTER — MYC MEDICAL ADVICE (OUTPATIENT)
Dept: INTERNAL MEDICINE | Facility: CLINIC | Age: 69
End: 2024-04-05
Payer: MEDICARE

## 2024-04-05 DIAGNOSIS — R13.12 OROPHARYNGEAL DYSPHAGIA: ICD-10-CM

## 2024-07-24 ENCOUNTER — MEDICAL CORRESPONDENCE (OUTPATIENT)
Dept: HEALTH INFORMATION MANAGEMENT | Facility: CLINIC | Age: 69
End: 2024-07-24

## 2024-07-30 ENCOUNTER — TELEPHONE (OUTPATIENT)
Dept: INTERNAL MEDICINE | Facility: CLINIC | Age: 69
End: 2024-07-30
Payer: MEDICARE

## 2024-07-30 NOTE — TELEPHONE ENCOUNTER
Juju, [facility] called and stated that they faxed a form to have the clinic fill out. They stated that they have not received it back. Advised Juju to resend the form. Routing to the team to watch for the form.    Latoya PATEL RN  [facility] Triage Team

## 2024-08-09 DIAGNOSIS — E78.5 HYPERLIPIDEMIA LDL GOAL <130: ICD-10-CM

## 2024-08-12 RX ORDER — SIMVASTATIN 40 MG
40 TABLET ORAL AT BEDTIME
Qty: 90 TABLET | Refills: 0 | Status: SHIPPED | OUTPATIENT
Start: 2024-08-12

## 2024-08-23 NOTE — TELEPHONE ENCOUNTER
Received forms (2) via fax from [facility] (not [facility] as stated in previous encounter) unable to verify via telephone number listed on forms (call dropping and no answer) Form deemed a medicare fraud.

## 2024-09-16 ENCOUNTER — ORDERS ONLY (AUTO-RELEASED) (OUTPATIENT)
Dept: CARDIOLOGY | Facility: CLINIC | Age: 69
End: 2024-09-16
Payer: MEDICARE

## 2024-09-16 ENCOUNTER — TELEPHONE (OUTPATIENT)
Dept: CARDIOLOGY | Facility: CLINIC | Age: 69
End: 2024-09-16
Payer: MEDICARE

## 2024-09-16 DIAGNOSIS — I48.0 PAROXYSMAL ATRIAL FIBRILLATION (H): Primary | ICD-10-CM

## 2024-09-16 DIAGNOSIS — I48.0 PAROXYSMAL ATRIAL FIBRILLATION (H): ICD-10-CM

## 2024-09-16 DIAGNOSIS — I47.10 SVT (SUPRAVENTRICULAR TACHYCARDIA) (H): ICD-10-CM

## 2024-09-16 NOTE — TELEPHONE ENCOUNTER
Spoke to pt who states that the episodes that he is having started on Thursday (9/12). Pt stats that when going up stairs he can get winded and lightheaded. States that he had an episode that occurred 3-4 hours.  PT unaware of what his heart rate runs and no way to check.  Pt has a history of SVT and ablation with Dr Poe in 2011. Continues on Flecainide 75 mg bid. Discussed to possibility of a monitor to see what pt is experiencing. Pt stated ok to the monitor being mailed out if needed. Message to Dr Castaneda. Alexsander

## 2024-09-16 NOTE — TELEPHONE ENCOUNTER
[facility] Call Center    Phone Message    May a detailed message be left on voicemail: yes     Reason for Call: Symptoms or Concerns     If patient has red-flag symptoms, warm transfer to triage line    Current symptom or concern: Palpitations     Symptoms have been present for:  comes and goes last few day(s)    Has patient previously been seen for this? Yes    By: Iskos     Are there any new or worsening symptoms? Yes: Pt stats symptoms come and go.     Pt did not want triage     Action Taken: Other: cardiology     Travel Screening: Not Applicable    Thank you!  [facility]

## 2024-09-17 ENCOUNTER — TELEPHONE (OUTPATIENT)
Dept: CARDIOLOGY | Facility: CLINIC | Age: 69
End: 2024-09-17
Payer: MEDICARE

## 2024-09-17 NOTE — TELEPHONE ENCOUNTER
[facility] Call Center    Phone Message    May a detailed message be left on voicemail: yes     Reason for Call: Other: The patient said that he would like a call back from Nurse Luci, who he spoke to yesterday. He would like to discuss his concerns again and the monitor that has been ordered.     Action Taken: Other: Cardiology    Travel Screening: Not Applicable    Thank you!  [facility]       Date of Service:

## 2024-09-17 NOTE — TELEPHONE ENCOUNTER
Spoke to patient who reports since he spoke to us yesterday his HR have been more frequent.  Heart racing and he is lightheaded and feels unwell.  Asked patient if he was able to measure HR and he reports he has no resources for this.  He reports it does not feel like AF.  Recommended that if he does not feel well that he go be evaluated in UC or ED.  Also recommended that he not drive and have someone accompany him.  Patient reports he is going to have his wife take him.  JT Figueredo

## 2024-09-20 NOTE — PROGRESS NOTES
"      Cardiology Progress Note    Date of Service: 09/23/24      Reason for visit: Seeing for complaints of palpitations.     Primary cardiologist: Dr. Samuel Castaneda      HPI:  Mr. Flaherty is a pleasant 69 year old male with a PMhx including HTN, CKD, dyslipidemia, and gout. He also has SVT s/p ablation in 2022, and paroxysmal atrial tachycardia and atrial fibrillation. He is maintained longstanding on flecainide. He follows here with EP and when seen last by Dr. Castaneda in Dec 2023, he was doing well with no concerns. He was to follow up in 2 years.    I'm meeting Martin in clinic today as last week he called to report palpitations. Plan was for a 14 day Ziopatch but then he called the following day stating that he was lightheaded and felt his heart racing, so he was directed to the ER. However, he opted not to do so as he was \"getting used to it.\"  He tells me that since the middle of last week he has felt somewhat poorly. He notes palpitations, a bit more CALDERON, and feeling LH at times. He denies chest pain, LE edema, or presyncope/syncope.    EKG in clinic today shows atrial flutter with a HR of 109.    Most recent labs performed were reviewed as below.        ASSESSMENT/PLAN:    Atrial arrhythmias.  --Hx PAF and paroxysmal atrial tachycardia. This was previously well suppressed on flecainide. Also a hx of SVT s/p ablation in 2022. Recently, he has had more palpitations since the middle of last week. EKG shows atrial flutter with a HR of 109. He has a Ziopatch in place as well which was mailed to him last week.   --As he is not decompensated, will see if we can arrange for FAHEEM/cardioversion in the next week or so. In the meantime, I will increase his Flecainide to 100mg BID (was on 75mg BID). However, he was advised to seek urgent evaluation in the ER should symptoms abruptly worsen.  --Today, we also discussed the need for anticoagulation. Will start Eliquis, but if cost-prohibitive asked him to call and we " would check into Xarleto vs warfarin. He was instructed to begin this today.     2. Hypertension.   --Appears well controlled on amlodipine 10mg and lisinopril 2.5mg daily. Can reduce this if needed if BP running on the lower side but feeling well currently.       Follow up plan: Plan for FAHEEM/cardioversion asap, then follow up with EP DEBBY after. I asked the patient to call sooner with any new concerns, and advised to seek urgent evaluation with any significant changes in symptoms in the meantime.       Orders this Visit:  Orders Placed This Encounter   Procedures    EKG 12-lead complete w/read - Clinics (performed today)    Cardioversion    Transesophageal Echocardiogram     Orders Placed This Encounter   Medications    apixaban ANTICOAGULANT (ELIQUIS) 5 MG tablet     Sig: Take 1 tablet (5 mg) by mouth 2 times daily.     Dispense:  180 tablet     Refill:  3    DISCONTD: flecainide (TAMBOCOR) 50 MG tablet     Sig: Take 2 tablets (100 mg) by mouth 2 times daily.    flecainide (TAMBOCOR) 50 MG tablet     Sig: Take 2 tablets (100 mg) by mouth 2 times daily.     Dispense:  120 tablet     Refill:  3     Medications Discontinued During This Encounter   Medication Reason    flecainide (TAMBOCOR) 50 MG tablet     flecainide (TAMBOCOR) 50 MG tablet Reorder (No AVS)       CURRENT MEDICATIONS:  Current Outpatient Medications   Medication Sig Dispense Refill    allopurinol (ZYLOPRIM) 300 MG tablet TAKE 1 TABLET BY MOUTH EVERY DAY 90 tablet 0    amLODIPine (NORVASC) 10 MG tablet Take 1 tablet (10 mg) by mouth daily 90 tablet 3    apixaban ANTICOAGULANT (ELIQUIS) 5 MG tablet Take 1 tablet (5 mg) by mouth 2 times daily. 180 tablet 3    Choline Fenofibrate (FENOFIBRIC ACID) 135 MG CPDR Take 135 mg by mouth daily      fenofibrate (TRICOR) 145 MG tablet Take 1 tablet (145 mg) by mouth daily 90 tablet 3    flecainide (TAMBOCOR) 50 MG tablet Take 2 tablets (100 mg) by mouth 2 times daily. 120 tablet 3    lisinopril (ZESTRIL) 2.5 MG  "tablet Take 1 tablet (2.5 mg) by mouth daily 90 tablet 3    omeprazole (PRILOSEC) 20 MG DR capsule Take 1 capsule (20 mg) by mouth daily 90 capsule 3    simvastatin (ZOCOR) 40 MG tablet Take 1 tablet (40 mg) by mouth At Bedtime Appointment required for further refills 90 tablet 0    famotidine (PEPCID) 20 MG tablet Take 1 tablet (20 mg) by mouth 2 times daily as needed (stomach upset/acid reflux) (Patient not taking: Reported on 12/12/2023) 60 tablet 3         Review of Systems:  POS ROS ARE BOLDED, all other negative.    Cardiovascular: Chest pain, palpitations, orthopnea, LE edema  Resp: Dyspnea on exertion, cough, known chronic lung disease  Hematologic/lymphatic: Current systemic anticoagulation, hx of blood clots, new bleeding concerns.  Neurological: Dizziness, presyncope/syncope.    Physical Exam:  Vitals: /72 (BP Location: Right arm, Patient Position: Sitting)   Pulse 112   Ht 1.753 m (5' 9\")   Wt 111.7 kg (246 lb 4.8 oz)   BMI 36.37 kg/m     Wt Readings from Last 4 Encounters:   09/23/24 111.7 kg (246 lb 4.8 oz)   12/12/23 113.1 kg (249 lb 4.8 oz)   10/31/23 111.2 kg (245 lb 1.6 oz)   10/19/23 111.1 kg (245 lb)       GEN:  In general, this is a well nourished male in no acute distress on RA.  Patient ambulatory, unaccompanied.   C/V:  Regular rhythm, tachycardic. No murmur, rub or gallop. No S3 or RV heave.   RESP: Respirations are unlabored. No use of accessory muscles. Clear to auscultation bilaterally without wheezing, rales, or rhonchi.  EXTREM: No LE edema.   NEURO: Alert and oriented, cooperative. Gait not assessed.     Recent Lab Results:    LIPID RESULTS:  Lab Results   Component Value Date    CHOL 132 10/31/2023    CHOL 158 01/07/2021    HDL 35 (L) 10/31/2023    HDL 27 (L) 01/07/2021    LDL 63 10/31/2023    LDL 98 01/07/2021    TRIG 168 (H) 10/31/2023    TRIG 165 (H) 01/07/2021    CHOLHDLRATIO 4.3 06/29/2015       CBC RESULTS:  Lab Results   Component Value Date    WBC 6.0 10/31/2023    " WBC 4.8 05/08/2019    RBC 4.84 10/31/2023    RBC 5.00 05/08/2019    HGB 14.4 10/31/2023    HGB 15.1 05/08/2019    HCT 42.4 10/31/2023    HCT 42.4 05/08/2019    MCV 88 10/31/2023    MCV 85 05/08/2019    MCH 29.8 10/31/2023    MCH 30.2 05/08/2019    MCHC 34.0 10/31/2023    MCHC 35.6 05/08/2019    RDW 12.2 10/31/2023    RDW 12.5 05/08/2019     10/31/2023     (L) 05/08/2019       BMP RESULTS:  Lab Results   Component Value Date     10/31/2023     01/07/2021    POTASSIUM 3.7 10/31/2023    POTASSIUM 4.3 04/25/2022    POTASSIUM 4.2 01/07/2021    CHLORIDE 104 10/31/2023    CHLORIDE 107 04/25/2022    CHLORIDE 108 01/07/2021    CO2 25 10/31/2023    CO2 30 04/25/2022    CO2 29 01/07/2021    ANIONGAP 10 10/31/2023    ANIONGAP 3 04/25/2022    ANIONGAP 3 01/07/2021     (H) 10/31/2023     (H) 04/25/2022     (H) 01/07/2021    BUN 15.9 10/31/2023    BUN 16 04/25/2022    BUN 16 01/07/2021    CR 1.37 (H) 10/31/2023    CR 1.17 01/07/2021    GFRESTIMATED 57 (L) 10/31/2023    GFRESTIMATED 65 01/07/2021    GFRESTBLACK 76 01/07/2021    GAVIN 9.4 10/31/2023    GAVIN 9.1 01/07/2021        40 total minutes was spent today including chart review, precharting, history and exam, patient education, post visit documentation, and reviewing studies as outlined above. Also includes coordination of care with scheduling/EP team.      Thao Cook PA-C  [facility]

## 2024-09-23 ENCOUNTER — TELEPHONE (OUTPATIENT)
Dept: CARDIOLOGY | Facility: CLINIC | Age: 69
End: 2024-09-23

## 2024-09-23 ENCOUNTER — OFFICE VISIT (OUTPATIENT)
Dept: CARDIOLOGY | Facility: CLINIC | Age: 69
End: 2024-09-23
Payer: MEDICARE

## 2024-09-23 ENCOUNTER — HOSPITAL ENCOUNTER (OUTPATIENT)
Facility: CLINIC | Age: 69
End: 2024-09-23
Admitting: INTERNAL MEDICINE
Payer: MEDICARE

## 2024-09-23 VITALS
WEIGHT: 246.3 LBS | BODY MASS INDEX: 36.48 KG/M2 | HEART RATE: 112 BPM | HEIGHT: 69 IN | SYSTOLIC BLOOD PRESSURE: 108 MMHG | DIASTOLIC BLOOD PRESSURE: 72 MMHG

## 2024-09-23 DIAGNOSIS — R00.2 PALPITATIONS: Primary | ICD-10-CM

## 2024-09-23 DIAGNOSIS — I47.10 PAROXYSMAL SUPRAVENTRICULAR TACHYCARDIA (H): ICD-10-CM

## 2024-09-23 DIAGNOSIS — R00.2 PALPITATIONS: ICD-10-CM

## 2024-09-23 DIAGNOSIS — I48.92 ATRIAL FLUTTER, UNSPECIFIED TYPE (H): ICD-10-CM

## 2024-09-23 PROCEDURE — 99215 OFFICE O/P EST HI 40 MIN: CPT | Performed by: PHYSICIAN ASSISTANT

## 2024-09-23 PROCEDURE — 93000 ELECTROCARDIOGRAM COMPLETE: CPT | Performed by: PHYSICIAN ASSISTANT

## 2024-09-23 RX ORDER — FLECAINIDE ACETATE 50 MG/1
100 TABLET ORAL 2 TIMES DAILY
Qty: 120 TABLET | Refills: 3 | Status: SHIPPED | OUTPATIENT
Start: 2024-09-23

## 2024-09-23 RX ORDER — FLECAINIDE ACETATE 50 MG/1
100 TABLET ORAL 2 TIMES DAILY
Status: SHIPPED
Start: 2024-09-23 | End: 2024-09-23

## 2024-09-23 NOTE — PATIENT INSTRUCTIONS
Thank you for visiting the cardiology clinic today.      Medication changes:    INCREASE Flecainide to 2 pills twice daily.  START Eliquis 5mg twice daily   --->if you get to the pharmacy and this is cost-prohibitive for you, please call our nurses to find an alternative.       Follow up Plan:  Return after cardioversion to follow up as planned.     Please feel free to reach out to our nursing team at [phone] with any questions or concerns.  You can also send us a MyChart and we will get back with you as soon as possible.

## 2024-09-23 NOTE — LETTER
"9/23/2024    Stephania Laughlin MD  [address] [facility] [address]    RE: Martin Flaherty       Dear Colleague,     I had the pleasure of seeing Martin Flaherty in the [facility].        Cardiology Progress Note    Date of Service: 09/23/24      Reason for visit: Seeing for complaints of palpitations.     Primary cardiologist: Dr. Samuel Castaneda      HPI:  Mr. Flaherty is a pleasant 69 year old male with a PMhx including HTN, CKD, dyslipidemia, and gout. He also has SVT s/p ablation in 2022, and paroxysmal atrial tachycardia and atrial fibrillation. He is maintained longstanding on flecainide. He follows here with EP and when seen last by Dr. Castaneda in Dec 2023, he was doing well with no concerns. He was to follow up in 2 years.    I'm meeting Martin in clinic today as last week he called to report palpitations. Plan was for a 14 day Ziopatch but then he called the following day stating that he was lightheaded and felt his heart racing, so he was directed to the ER. However, he opted not to do so as he was \"getting used to it.\"  He tells me that since the middle of last week he has felt somewhat poorly. He notes palpitations, a bit more CALDERON, and feeling LH at times. He denies chest pain, LE edema, or presyncope/syncope.    EKG in clinic today shows atrial flutter with a HR of 109.    Most recent labs performed were reviewed as below.        ASSESSMENT/PLAN:    Atrial arrhythmias.  --Hx PAF and paroxysmal atrial tachycardia. This was previously well suppressed on flecainide. Also a hx of SVT s/p ablation in 2022. Recently, he has had more palpitations since the middle of last week. EKG shows atrial flutter with a HR of 109. He has a Ziopatch in place as well which was mailed to him last week.   --As he is not decompensated, will see if we can arrange for FAHEEM/cardioversion in the next week or so. In the meantime, I will increase his Flecainide to 100mg BID (was on 75mg BID). However, he " was advised to seek urgent evaluation in the ER should symptoms abruptly worsen.  --Today, we also discussed the need for anticoagulation. Will start Eliquis, but if cost-prohibitive asked him to call and we would check into Xarleto vs warfarin. He was instructed to begin this today.     2. Hypertension.   --Appears well controlled on amlodipine 10mg and lisinopril 2.5mg daily. Can reduce this if needed if BP running on the lower side but feeling well currently.       Follow up plan: Plan for FAHEEM/cardioversion asap, then follow up with EP DEBBY after. I asked the patient to call sooner with any new concerns, and advised to seek urgent evaluation with any significant changes in symptoms in the meantime.       Orders this Visit:  Orders Placed This Encounter   Procedures     EKG 12-lead complete w/read - Clinics (performed today)     Cardioversion     Transesophageal Echocardiogram     Orders Placed This Encounter   Medications     apixaban ANTICOAGULANT (ELIQUIS) 5 MG tablet     Sig: Take 1 tablet (5 mg) by mouth 2 times daily.     Dispense:  180 tablet     Refill:  3     DISCONTD: flecainide (TAMBOCOR) 50 MG tablet     Sig: Take 2 tablets (100 mg) by mouth 2 times daily.     flecainide (TAMBOCOR) 50 MG tablet     Sig: Take 2 tablets (100 mg) by mouth 2 times daily.     Dispense:  120 tablet     Refill:  3     Medications Discontinued During This Encounter   Medication Reason     flecainide (TAMBOCOR) 50 MG tablet      flecainide (TAMBOCOR) 50 MG tablet Reorder (No AVS)       CURRENT MEDICATIONS:  Current Outpatient Medications   Medication Sig Dispense Refill     allopurinol (ZYLOPRIM) 300 MG tablet TAKE 1 TABLET BY MOUTH EVERY DAY 90 tablet 0     amLODIPine (NORVASC) 10 MG tablet Take 1 tablet (10 mg) by mouth daily 90 tablet 3     apixaban ANTICOAGULANT (ELIQUIS) 5 MG tablet Take 1 tablet (5 mg) by mouth 2 times daily. 180 tablet 3     Choline Fenofibrate (FENOFIBRIC ACID) 135 MG CPDR Take 135 mg by mouth daily    "    fenofibrate (TRICOR) 145 MG tablet Take 1 tablet (145 mg) by mouth daily 90 tablet 3     flecainide (TAMBOCOR) 50 MG tablet Take 2 tablets (100 mg) by mouth 2 times daily. 120 tablet 3     lisinopril (ZESTRIL) 2.5 MG tablet Take 1 tablet (2.5 mg) by mouth daily 90 tablet 3     omeprazole (PRILOSEC) 20 MG DR capsule Take 1 capsule (20 mg) by mouth daily 90 capsule 3     simvastatin (ZOCOR) 40 MG tablet Take 1 tablet (40 mg) by mouth At Bedtime Appointment required for further refills 90 tablet 0     famotidine (PEPCID) 20 MG tablet Take 1 tablet (20 mg) by mouth 2 times daily as needed (stomach upset/acid reflux) (Patient not taking: Reported on 12/12/2023) 60 tablet 3         Review of Systems:  POS ROS ARE BOLDED, all other negative.    Cardiovascular: Chest pain, palpitations, orthopnea, LE edema  Resp: Dyspnea on exertion, cough, known chronic lung disease  Hematologic/lymphatic: Current systemic anticoagulation, hx of blood clots, new bleeding concerns.  Neurological: Dizziness, presyncope/syncope.    Physical Exam:  Vitals: /72 (BP Location: Right arm, Patient Position: Sitting)   Pulse 112   Ht 1.753 m (5' 9\")   Wt 111.7 kg (246 lb 4.8 oz)   BMI 36.37 kg/m     Wt Readings from Last 4 Encounters:   09/23/24 111.7 kg (246 lb 4.8 oz)   12/12/23 113.1 kg (249 lb 4.8 oz)   10/31/23 111.2 kg (245 lb 1.6 oz)   10/19/23 111.1 kg (245 lb)       GEN:  In general, this is a well nourished male in no acute distress on RA.  Patient ambulatory, unaccompanied.   C/V:  Regular rhythm, tachycardic. No murmur, rub or gallop. No S3 or RV heave.   RESP: Respirations are unlabored. No use of accessory muscles. Clear to auscultation bilaterally without wheezing, rales, or rhonchi.  EXTREM: No LE edema.   NEURO: Alert and oriented, cooperative. Gait not assessed.     Recent Lab Results:    LIPID RESULTS:  Lab Results   Component Value Date    CHOL 132 10/31/2023    CHOL 158 01/07/2021    HDL 35 (L) 10/31/2023    HDL 27 " (L) 01/07/2021    LDL 63 10/31/2023    LDL 98 01/07/2021    TRIG 168 (H) 10/31/2023    TRIG 165 (H) 01/07/2021    CHOLHDLRATIO 4.3 06/29/2015       CBC RESULTS:  Lab Results   Component Value Date    WBC 6.0 10/31/2023    WBC 4.8 05/08/2019    RBC 4.84 10/31/2023    RBC 5.00 05/08/2019    HGB 14.4 10/31/2023    HGB 15.1 05/08/2019    HCT 42.4 10/31/2023    HCT 42.4 05/08/2019    MCV 88 10/31/2023    MCV 85 05/08/2019    MCH 29.8 10/31/2023    MCH 30.2 05/08/2019    MCHC 34.0 10/31/2023    MCHC 35.6 05/08/2019    RDW 12.2 10/31/2023    RDW 12.5 05/08/2019     10/31/2023     (L) 05/08/2019       BMP RESULTS:  Lab Results   Component Value Date     10/31/2023     01/07/2021    POTASSIUM 3.7 10/31/2023    POTASSIUM 4.3 04/25/2022    POTASSIUM 4.2 01/07/2021    CHLORIDE 104 10/31/2023    CHLORIDE 107 04/25/2022    CHLORIDE 108 01/07/2021    CO2 25 10/31/2023    CO2 30 04/25/2022    CO2 29 01/07/2021    ANIONGAP 10 10/31/2023    ANIONGAP 3 04/25/2022    ANIONGAP 3 01/07/2021     (H) 10/31/2023     (H) 04/25/2022     (H) 01/07/2021    BUN 15.9 10/31/2023    BUN 16 04/25/2022    BUN 16 01/07/2021    CR 1.37 (H) 10/31/2023    CR 1.17 01/07/2021    GFRESTIMATED 57 (L) 10/31/2023    GFRESTIMATED 65 01/07/2021    GFRESTBLACK 76 01/07/2021    GAVIN 9.4 10/31/2023    GAVIN 9.1 01/07/2021        40 total minutes was spent today including chart review, precharting, history and exam, patient education, post visit documentation, and reviewing studies as outlined above. Also includes coordination of care with scheduling/EP team.      Thao Cook PA-C  [facility]         Thank you for allowing me to participate in the care of your patient.      Sincerely,     DES Blood     [facility]  cc:   Referred Self, MD  No address on file

## 2024-09-26 ENCOUNTER — ANESTHESIA EVENT (OUTPATIENT)
Dept: SURGERY | Facility: CLINIC | Age: 69
End: 2024-09-26
Payer: MEDICARE

## 2024-09-26 ENCOUNTER — TELEPHONE (OUTPATIENT)
Dept: CARDIOLOGY | Facility: CLINIC | Age: 69
End: 2024-09-26
Payer: MEDICARE

## 2024-09-26 ASSESSMENT — ENCOUNTER SYMPTOMS: DYSRHYTHMIAS: 1

## 2024-09-26 NOTE — ANESTHESIA PREPROCEDURE EVALUATION
Anesthesia Pre-Procedure Evaluation    Patient: Martin Flaherty   MRN: [id number] : 1955        Procedure : Procedure(s):  Anesthesia cardioversion  Transesophageal echocardiogram intraoperative          Past Medical History:   Diagnosis Date    Acid reflux disease     Atrial tachycardia (H24)     Benign essential hypertension     Chronic gout     History of atrial fibrillation     remote; not on AC    Hypertriglyceridemia     Obesity (BMI 30-39.9)     Pure hypercholesterolemia       Past Surgical History:   Procedure Laterality Date    APPENDECTOMY           EP STUDY, MODIFIED  2011    Attempted ablation 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      No Known Allergies   Social History     Tobacco Use    Smoking status: Never    Smokeless tobacco: Never   Substance Use Topics    Alcohol use: Yes     Comment: very little      Wt Readings from Last 1 Encounters:   24 111.7 kg (246 lb 4.8 oz)        Anesthesia Evaluation   Pt has had prior anesthetic.     No history of anesthetic complications       ROS/MED HX  ENT/Pulmonary:    (-) sleep apnea   Neurologic:       Cardiovascular:     (+) Dyslipidemia hypertension- -   -  - -   Taking blood thinners (Eliquis)                     dysrhythmias (sp cardioversion  for paroxysmal Atrial tach and Afib), a-fib,             METS/Exercise Tolerance:     Hematologic:       Musculoskeletal: Comment: Gout      GI/Hepatic:     (+) GERD, Asymptomatic on medication,                  Renal/Genitourinary:     (+) renal disease, type: CRI,            Endo:     (+)               Obesity (BMI 36),       Psychiatric/Substance Use:       Infectious Disease:       Malignancy:       Other:            Physical Exam    Airway        Mallampati: III   TM distance: > 3 FB   Neck ROM: full   Mouth opening: > 3 cm    Respiratory Devices and Support         Dental       (+) Modest Abnormalities - crowns, retainers, 1 or 2 missing teeth      Cardiovascular          Rhythm and  "rate: irregular and normal     Pulmonary   pulmonary exam normal                OUTSIDE LABS:  CBC:   Lab Results   Component Value Date    WBC 6.0 10/31/2023    WBC 5.8 10/06/2023    HGB 14.4 10/31/2023    HGB 15.6 10/06/2023    HCT 42.4 10/31/2023    HCT 46.6 10/06/2023     10/31/2023     10/06/2023     BMP:   Lab Results   Component Value Date     10/31/2023     10/06/2023    POTASSIUM 3.7 10/31/2023    POTASSIUM 4.0 10/06/2023    CHLORIDE 104 10/31/2023    CHLORIDE 105 10/06/2023    CO2 25 10/31/2023    CO2 26 10/06/2023    BUN 15.9 10/31/2023    BUN 12.2 10/06/2023    CR 1.37 (H) 10/31/2023    CR 1.30 (H) 10/06/2023     (H) 10/31/2023     (H) 10/06/2023     COAGS: No results found for: \"PTT\", \"INR\", \"FIBR\"  POC: No results found for: \"BGM\", \"HCG\", \"HCGS\"  HEPATIC:   Lab Results   Component Value Date    ALBUMIN 4.3 10/31/2023    PROTTOTAL 7.2 10/31/2023    ALT 27 10/31/2023    AST 26 10/31/2023    ALKPHOS 74 10/31/2023    BILITOTAL 0.4 10/31/2023     OTHER:   Lab Results   Component Value Date    A1C 5.6 10/31/2023    GAVIN 9.4 10/31/2023    PHOS 2.0 (L) 10/31/2018    MAG 2.0 10/31/2018    LIPASE 77 05/08/2019    TSH 3.52 10/31/2018    T4 0.93 10/31/2018    SED 8 11/29/2008       Anesthesia Plan    ASA Status:  3    NPO Status:  NPO Appropriate    Anesthesia Type: General.   Induction: Intravenous.           Consents    Anesthesia Plan(s) and associated risks, benefits, and realistic alternatives discussed. Questions answered and patient/representative(s) expressed understanding.     - Discussed:     - Discussed with:  Patient            Postoperative Care            Comments:               Dayday Ross MD    I have reviewed the pertinent notes and labs in the chart from the past 30 days and (re)examined the patient.  Any updates or changes from those notes are reflected in this note.              # Obesity: Estimated body mass index is 36.37 kg/m  as calculated from " "the following:    Height as of 9/23/24: 1.753 m (5' 9\").    Weight as of 9/23/24: 111.7 kg (246 lb 4.8 oz).      "

## 2024-09-26 NOTE — TELEPHONE ENCOUNTER
Spoke to pt regarding FAHEEM/Cardioversion tomorrow.  Pt aware of arrival time of 1100 and where to check in  Pt have No solids 8 hours prior to arrival time  Pt may have sips of water for am meds  Discussed meds to be held-pt has no medications that need to be held   Eliquis started on 9/23/24.     Pt has a  for ride home and someone to stay with pt x 24 hours once pt arrives home   Pt voiced understanding and states he has have no further questions at this time.  Alexsander

## 2024-09-27 ENCOUNTER — HOSPITAL ENCOUNTER (OUTPATIENT)
Facility: CLINIC | Age: 69
Discharge: HOME | End: 2024-09-27
Admitting: INTERNAL MEDICINE
Payer: MEDICARE

## 2024-09-27 ENCOUNTER — HOSPITAL ENCOUNTER (OUTPATIENT)
Dept: CARDIOLOGY | Facility: CLINIC | Age: 69
Discharge: HOME | End: 2024-09-27
Attending: PHYSICIAN ASSISTANT
Payer: MEDICARE

## 2024-09-27 ENCOUNTER — HOSPITAL ENCOUNTER (OUTPATIENT)
Dept: MEDSURG UNIT | Facility: CLINIC | Age: 69
Discharge: HOME | End: 2024-09-27
Attending: PHYSICIAN ASSISTANT
Payer: MEDICARE

## 2024-09-27 ENCOUNTER — ANESTHESIA (OUTPATIENT)
Dept: SURGERY | Facility: CLINIC | Age: 69
End: 2024-09-27
Payer: MEDICARE

## 2024-09-27 VITALS
HEART RATE: 77 BPM | DIASTOLIC BLOOD PRESSURE: 69 MMHG | RESPIRATION RATE: 13 BRPM | WEIGHT: 247.4 LBS | TEMPERATURE: 98.2 F | OXYGEN SATURATION: 93 % | HEIGHT: 69 IN | BODY MASS INDEX: 36.64 KG/M2 | SYSTOLIC BLOOD PRESSURE: 94 MMHG

## 2024-09-27 DIAGNOSIS — R00.2 PALPITATIONS: ICD-10-CM

## 2024-09-27 DIAGNOSIS — I48.92 ATRIAL FLUTTER, UNSPECIFIED TYPE (H): ICD-10-CM

## 2024-09-27 LAB
LVEF ECHO: NORMAL
MAGNESIUM SERPL-MCNC: 1.9 MG/DL (ref 1.7–2.3)
POTASSIUM SERPL-SCNC: 4.3 MMOL/L (ref 3.4–5.3)

## 2024-09-27 PROCEDURE — 999N000183 HC STATISTIC TEE INCLUDES SEDATION

## 2024-09-27 PROCEDURE — 250N000011 HC RX IP 250 OP 636: Performed by: NURSE ANESTHETIST, CERTIFIED REGISTERED

## 2024-09-27 PROCEDURE — 999N000184 HC STATISTIC TELEMETRY

## 2024-09-27 PROCEDURE — 36415 COLL VENOUS BLD VENIPUNCTURE: CPT

## 2024-09-27 PROCEDURE — 93320 DOPPLER ECHO COMPLETE: CPT | Mod: 26 | Performed by: INTERNAL MEDICINE

## 2024-09-27 PROCEDURE — 83735 ASSAY OF MAGNESIUM: CPT

## 2024-09-27 PROCEDURE — 93005 ELECTROCARDIOGRAM TRACING: CPT

## 2024-09-27 PROCEDURE — 258N000003 HC RX IP 258 OP 636: Performed by: INTERNAL MEDICINE

## 2024-09-27 PROCEDURE — 93010 ELECTROCARDIOGRAM REPORT: CPT | Mod: XU | Performed by: INTERNAL MEDICINE

## 2024-09-27 PROCEDURE — 92960 CARDIOVERSION ELECTRIC EXT: CPT | Performed by: NURSE ANESTHETIST, CERTIFIED REGISTERED

## 2024-09-27 PROCEDURE — 93325 DOPPLER ECHO COLOR FLOW MAPG: CPT

## 2024-09-27 PROCEDURE — 999N000054 HC STATISTIC EKG NON-CHARGEABLE

## 2024-09-27 PROCEDURE — 250N000011 HC RX IP 250 OP 636: Performed by: INTERNAL MEDICINE

## 2024-09-27 PROCEDURE — C8925 2D TEE W OR W/O FOL W/CON,IN: HCPCS

## 2024-09-27 PROCEDURE — 99152 MOD SED SAME PHYS/QHP 5/>YRS: CPT | Performed by: INTERNAL MEDICINE

## 2024-09-27 PROCEDURE — 250N000009 HC RX 250: Performed by: INTERNAL MEDICINE

## 2024-09-27 PROCEDURE — 92960 CARDIOVERSION ELECTRIC EXT: CPT | Mod: 59 | Performed by: INTERNAL MEDICINE

## 2024-09-27 PROCEDURE — 93312 ECHO TRANSESOPHAGEAL: CPT | Mod: 26 | Performed by: INTERNAL MEDICINE

## 2024-09-27 PROCEDURE — 93325 DOPPLER ECHO COLOR FLOW MAPG: CPT | Mod: 26 | Performed by: INTERNAL MEDICINE

## 2024-09-27 PROCEDURE — 92960 CARDIOVERSION ELECTRIC EXT: CPT

## 2024-09-27 PROCEDURE — 84132 ASSAY OF SERUM POTASSIUM: CPT

## 2024-09-27 PROCEDURE — 370N000017 HC ANESTHESIA TECHNICAL FEE, PER MIN

## 2024-09-27 PROCEDURE — 92960 CARDIOVERSION ELECTRIC EXT: CPT | Performed by: ANESTHESIOLOGY

## 2024-09-27 PROCEDURE — 999N000010 HC STATISTIC ANES STAT CODE-CRNA PER MINUTE

## 2024-09-27 RX ORDER — POTASSIUM CHLORIDE 1500 MG/1
20 TABLET, EXTENDED RELEASE ORAL
Status: DISCONTINUED | OUTPATIENT
Start: 2024-09-27 | End: 2024-09-27 | Stop reason: HOSPADM

## 2024-09-27 RX ORDER — FENTANYL CITRATE 50 UG/ML
25 INJECTION, SOLUTION INTRAMUSCULAR; INTRAVENOUS
Status: DISCONTINUED | OUTPATIENT
Start: 2024-09-27 | End: 2024-09-27 | Stop reason: HOSPADM

## 2024-09-27 RX ORDER — PROPOFOL 10 MG/ML
INJECTION, EMULSION INTRAVENOUS PRN
Status: DISCONTINUED | OUTPATIENT
Start: 2024-09-27 | End: 2024-09-27

## 2024-09-27 RX ORDER — DEXTROSE MONOHYDRATE 25 G/50ML
9.5 INJECTION, SOLUTION INTRAVENOUS
Status: DISCONTINUED | OUTPATIENT
Start: 2024-09-27 | End: 2024-09-27 | Stop reason: HOSPADM

## 2024-09-27 RX ORDER — LIDOCAINE 50 MG/G
OINTMENT TOPICAL ONCE
Status: COMPLETED | OUTPATIENT
Start: 2024-09-27 | End: 2024-09-27

## 2024-09-27 RX ORDER — MAGNESIUM SULFATE HEPTAHYDRATE 40 MG/ML
2 INJECTION, SOLUTION INTRAVENOUS
Status: DISCONTINUED | OUTPATIENT
Start: 2024-09-27 | End: 2024-09-27 | Stop reason: HOSPADM

## 2024-09-27 RX ORDER — NALOXONE HYDROCHLORIDE 0.4 MG/ML
0.4 INJECTION, SOLUTION INTRAMUSCULAR; INTRAVENOUS; SUBCUTANEOUS
Status: DISCONTINUED | OUTPATIENT
Start: 2024-09-27 | End: 2024-09-27 | Stop reason: HOSPADM

## 2024-09-27 RX ORDER — POTASSIUM CHLORIDE 1500 MG/1
40 TABLET, EXTENDED RELEASE ORAL
Status: DISCONTINUED | OUTPATIENT
Start: 2024-09-27 | End: 2024-09-27 | Stop reason: HOSPADM

## 2024-09-27 RX ORDER — SODIUM CHLORIDE 9 MG/ML
1000 INJECTION, SOLUTION INTRAVENOUS CONTINUOUS
Status: DISCONTINUED | OUTPATIENT
Start: 2024-09-27 | End: 2024-09-27 | Stop reason: HOSPADM

## 2024-09-27 RX ORDER — NALOXONE HYDROCHLORIDE 0.4 MG/ML
0.2 INJECTION, SOLUTION INTRAMUSCULAR; INTRAVENOUS; SUBCUTANEOUS
Status: DISCONTINUED | OUTPATIENT
Start: 2024-09-27 | End: 2024-09-27 | Stop reason: HOSPADM

## 2024-09-27 RX ORDER — LIDOCAINE HYDROCHLORIDE 40 MG/ML
1.5 SOLUTION TOPICAL ONCE
Status: COMPLETED | OUTPATIENT
Start: 2024-09-27 | End: 2024-09-27

## 2024-09-27 RX ORDER — GLYCOPYRROLATE 0.2 MG/ML
0.1 INJECTION, SOLUTION INTRAMUSCULAR; INTRAVENOUS ONCE
Status: COMPLETED | OUTPATIENT
Start: 2024-09-27 | End: 2024-09-27

## 2024-09-27 RX ORDER — FLUMAZENIL 0.1 MG/ML
0.2 INJECTION, SOLUTION INTRAVENOUS
Status: DISCONTINUED | OUTPATIENT
Start: 2024-09-27 | End: 2024-09-27 | Stop reason: HOSPADM

## 2024-09-27 RX ORDER — SODIUM CHLORIDE 9 MG/ML
30 INJECTION, SOLUTION INTRAVENOUS CONTINUOUS
Status: DISCONTINUED | OUTPATIENT
Start: 2024-09-27 | End: 2024-09-27 | Stop reason: HOSPADM

## 2024-09-27 RX ORDER — LIDOCAINE 40 MG/G
CREAM TOPICAL
Status: DISCONTINUED | OUTPATIENT
Start: 2024-09-27 | End: 2024-09-27 | Stop reason: HOSPADM

## 2024-09-27 RX ADMIN — SODIUM CHLORIDE 30 ML/HR: 9 INJECTION, SOLUTION INTRAVENOUS at 11:58

## 2024-09-27 RX ADMIN — PROPOFOL 10 MG: 10 INJECTION, EMULSION INTRAVENOUS at 13:45

## 2024-09-27 RX ADMIN — MIDAZOLAM 1 MG: 1 INJECTION INTRAMUSCULAR; INTRAVENOUS at 13:18

## 2024-09-27 RX ADMIN — LIDOCAINE: 50 OINTMENT TOPICAL at 12:42

## 2024-09-27 RX ADMIN — PROPOFOL 60 MG: 10 INJECTION, EMULSION INTRAVENOUS at 13:44

## 2024-09-27 RX ADMIN — TOPICAL ANESTHETIC 1 ML: 200 SPRAY DENTAL; PERIODONTAL at 13:12

## 2024-09-27 RX ADMIN — MIDAZOLAM 1 MG: 1 INJECTION INTRAMUSCULAR; INTRAVENOUS at 13:14

## 2024-09-27 RX ADMIN — MIDAZOLAM 1 MG: 1 INJECTION INTRAMUSCULAR; INTRAVENOUS at 13:26

## 2024-09-27 RX ADMIN — FENTANYL CITRATE 50 MCG: 50 INJECTION, SOLUTION INTRAMUSCULAR; INTRAVENOUS at 13:15

## 2024-09-27 RX ADMIN — GLYCOPYRROLATE 0.1 MG: 0.2 INJECTION, SOLUTION INTRAMUSCULAR; INTRAVENOUS at 12:42

## 2024-09-27 ASSESSMENT — ACTIVITIES OF DAILY LIVING (ADL)
ADLS_ACUITY_SCORE: 35

## 2024-09-27 NOTE — ANESTHESIA POSTPROCEDURE EVALUATION
Patient: Martin Flaherty    Procedure: Procedure(s):  Anesthesia cardioversion  Transesophageal echocardiogram intraoperative       Anesthesia Type:  General    Note:     Postop Pain Control: Uneventful            Sign Out: Well controlled pain   PONV: No   Neuro/Psych: Uneventful            Sign Out: Acceptable/Baseline neuro status   Airway/Respiratory: Uneventful            Sign Out: Acceptable/Baseline resp. status   CV/Hemodynamics: Uneventful            Sign Out: Acceptable CV status; No obvious hypovolemia; No obvious fluid overload   Other NRE: NONE   DID A NON-ROUTINE EVENT OCCUR? No           Last vitals:  Vitals:    09/27/24 1415 09/27/24 1420 09/27/24 1425   BP: 103/69 99/68 98/69   Pulse: 79 77 78   Resp: 11 12 13   Temp:      SpO2: 93% 93% 93%       Electronically Signed By: Dayday Ross MD  September 27, 2024  2:44 PM

## 2024-09-27 NOTE — PROGRESS NOTES
Care Suites Procedure Nursing Note    Patient Information  Name: Martin Flaherty  Age: 69 year old    Procedure  Procedure: FAHEEM and DCCV   Procedure start time: 1313   Procedure complete time: 1355  Concerns/abnormal assessment: No immediate  If abnormal assessment, provider notified: N/A  Plan/Other: Proceed as planned.      1200 A/O. Pt denies difficulty swallowing or sleep apnea. No dentures or loose teeth. NPO since 2100. Discharge / post procedure instructions given to pt pre procedure w/ verbal understanding received.  All questions & concerns addressed.     1315   MD Sedation Assessment completed. Consent signed at this time.  Time Out done at this time.    FAHEEM: Pt tolerated  well. VSS.   Total sedation given - 3 mg Versed & 50 mcg Fentanyl.     CDV: Pt tolerated well. CDV x 1 @ 150 joules. See rhythm strips. Total sedation given by anesthesia.  EKG confirmed SR post DCCV      1445 Pt discharged per w/c to private vehicle. All personal belongings sent with pt. Pt to be NPO until 1515. Both pt & wife Lesley informed. Verbal understanding received.        Pedro Mccord RN

## 2024-09-27 NOTE — PROCEDURES
[facility]    Procedure: Cardioversion    Date/Time: 9/27/2024 1:51 PM    Performed by: Garo Aguiar MD  Authorized by: Thao Cook PA      UNIVERSAL PROTOCOL   Site Marked: Yes  Prior Images Obtained and Reviewed:  Yes  Required items: Required blood products, implants, devices and special equipment available    Patient identity confirmed:  Verbally with patient, arm band, provided demographic data and hospital-assigned identification number  Patient was reevaluated immediately before administering moderate or deep sedation or anesthesia  Confirmation Checklist:  Patient's identity using two indicators, relevant allergies, procedure was appropriate and matched the consent or emergent situation and correct equipment/implants were available  Time out: Immediately prior to the procedure a time out was called    Universal Protocol: the Joint Commission Universal Protocol was followed    Preparation: Patient was prepped and draped in usual sterile fashion       ANESTHESIA    Anesthesia was administered and monitored by anesthesiology.  See anesthesia documentation for details.    SEDATION  Patient Sedated: Yes    Sedation Type:  Deep  Sedation:  Propofol  Vital signs: Vital signs monitored during sedation      PROCEDURE DETAILS  Cardioversion basis: elective  Indications: failure of anti-arrhythmic medications  Pre-procedure rhythm: atrial flutter  Patient position: patient was placed in a supine position  Chest area: chest area exposed  Electrodes: pads  Electrodes placed: anterior-posterior  Number of attempts: 1    Details of Attempts:  A single 1500 J synchronized shock was delivered with pads in AP position, converting the patient to SR.   Post-procedure rhythm: normal sinus rhythm  Complications: no complications      PROCEDURE    Patient Tolerance:  Patient tolerated the procedure well with no immediate complications   The patient was advised to continue anticoagulation after the  procedure.

## 2024-09-27 NOTE — PRE-PROCEDURE
GENERAL PRE-PROCEDURE:   Procedure:  FAHEEM possible cardioversion of afib  Date/Time:  9/27/2024 1:16 PM    Verbal consent obtained?: Yes    Written consent obtained?: Yes    Risks and benefits: Risks, benefits and alternatives were discussed    DC Plan: Appropriate discharge home plan in place for patients who are going home after procedure   Consent given by:  Patient  Patient states understanding of procedure being performed: Yes    Patient's understanding of procedure matches consent: Yes    Procedure consent matches procedure scheduled: Yes    Expected level of sedation:  Moderate  Appropriately NPO:  Yes  ASA Class:  2  Mallampati  :  Grade 2- soft palate, base of uvula, tonsillar pillars, and portion of posterior pharyngeal wall visible  Lungs:  Lungs clear with good breath sounds bilaterally  Heart:  A-flutter  History & Physical reviewed:  History and physical reviewed and no updates needed  Statement of review:  I have reviewed the lab findings, diagnostic data, medications, and the plan for sedation

## 2024-09-27 NOTE — DISCHARGE INSTRUCTIONS
FAHEEM  (Transesophageal Echocardiogram)  with Cardioversion Discharge Instructions    After you go home:    Have an adult stay with you for 6 hours.       For 24 hours - due to the sedation you received:  Relax and take it easy.  Do NOT make any important or legal decisions.  Do NOT drive or operate machines at home or at work.  Do NOT drink alcohol.    Diet:    You may resume your normal diet, but no scratchy foods for two days.  If your throat is sore, eat cold, bland or soft foods.  You may have heartburn if the tube used in the exam entered your stomach.  If so:   - Do not eat acidic and spicy foods.   - Do not eat three hours before bedtime.  Clear liquids are okay.   - When lying down, use two pillows to raise your head.    Medicines:    Take your medications, including blood thinners, unless your provider tells you not to.  If you have stopped any medicines, check with your provider about when to restart them.  You may take Tylenol (Acetaminophen) if your throat is sore.  You may take antacids if you have heartburn.      Follow Up Appointments:    Follow up with your cardiologist at [facility] of patient preference as instructed.  Follow up with your primary care provider as needed.    If you ve had a cardioversion:    The skin on your chest or back may feel tender for 48 hours.  If your skin is tender, you may:  Use a cold pack on the site. Never use ice directly on your skin. Use the cold pack for 20 minutes. Remove it for at least 30 minutes before re-using.  Apply 1% hydrocortisone cream to the skin (sold at drug stores)  Take Advil (Ibuprofen) or Tylenol (Acetaminophen).      Call the clinic if:    You have heartburn that is severe or lasts more than 72 hours.  You have a sore throat that feels worse after 72 hours.  You have shortness of breath, neck pain, chest pain, fever, chills, coughing up blood, or other unusual signs.  Call your cardiologist right away if you have an irregular heartbeat,  shortness of breath or feel dizzy.  Questions or concerns      [facility] at [place]:    [phone] UM (7 days a week)    Or you can contact your provider via My Chart

## 2024-09-27 NOTE — PROGRESS NOTES
1100Care Suites Admission Nursing Note    Patient Information  Name: Martin Flaherty  Age: 69 year old  Reason for admission: FAHEEM and DCCV  Care Suites arrival time: 1100    Visitor Information  Name: scar     Patient Admission/Assessment   Pre-procedure assessment complete: Yes  If abnormal assessment/labs, provider notified: NA  NPO: Yes  Medications held per instructions/orders: NA  Consent: to be obtained    Patient oriented to room: Yes  Education/questions answered: Yes  Plan/other: Proceed as planned    Discharge Planning  Discharge name/phone number: Lesley [phone]  Overnight post sedation caregiver: yes  Discharge location: home    Pedro Mccord RN

## 2024-09-29 LAB
ATRIAL RATE - MUSE: 105 BPM
ATRIAL RATE - MUSE: 75 BPM
DIASTOLIC BLOOD PRESSURE - MUSE: NORMAL MMHG
DIASTOLIC BLOOD PRESSURE - MUSE: NORMAL MMHG
INTERPRETATION ECG - MUSE: NORMAL
INTERPRETATION ECG - MUSE: NORMAL
P AXIS - MUSE: 44 DEGREES
P AXIS - MUSE: NORMAL DEGREES
PR INTERVAL - MUSE: 204 MS
PR INTERVAL - MUSE: NORMAL MS
QRS DURATION - MUSE: 106 MS
QRS DURATION - MUSE: 96 MS
QT - MUSE: 392 MS
QT - MUSE: 432 MS
QTC - MUSE: 482 MS
QTC - MUSE: 518 MS
R AXIS - MUSE: 64 DEGREES
R AXIS - MUSE: 94 DEGREES
SYSTOLIC BLOOD PRESSURE - MUSE: NORMAL MMHG
SYSTOLIC BLOOD PRESSURE - MUSE: NORMAL MMHG
T AXIS - MUSE: -18 DEGREES
T AXIS - MUSE: 71 DEGREES
VENTRICULAR RATE- MUSE: 105 BPM
VENTRICULAR RATE- MUSE: 75 BPM

## 2024-09-30 ENCOUNTER — TELEPHONE (OUTPATIENT)
Dept: CARDIOLOGY | Facility: CLINIC | Age: 69
End: 2024-09-30
Payer: MEDICARE

## 2024-09-30 NOTE — TELEPHONE ENCOUNTER
SUSAN to see how pt is feeling Post Cardioversion Friday. Pt was in SR HR 75 on discharge. Alexsander

## 2024-09-30 NOTE — TELEPHONE ENCOUNTER
Pt called back and states that he is feeling good and continues to be in rhythm.  Pt does not need a call back. Alexsander

## 2024-10-01 ENCOUNTER — PATIENT OUTREACH (OUTPATIENT)
Dept: CARE COORDINATION | Facility: CLINIC | Age: 69
End: 2024-10-01
Payer: MEDICARE

## 2024-10-01 DIAGNOSIS — I10 BENIGN ESSENTIAL HYPERTENSION: ICD-10-CM

## 2024-10-01 DIAGNOSIS — E78.1 HYPERTRIGLYCERIDEMIA: ICD-10-CM

## 2024-10-01 DIAGNOSIS — I47.10 PAROXYSMAL SUPRAVENTRICULAR TACHYCARDIA (H): ICD-10-CM

## 2024-10-02 RX ORDER — LISINOPRIL 2.5 MG/1
2.5 TABLET ORAL DAILY
Qty: 90 TABLET | Refills: 0 | OUTPATIENT
Start: 2024-10-02

## 2024-10-02 RX ORDER — AMLODIPINE BESYLATE 10 MG/1
10 TABLET ORAL DAILY
Qty: 90 TABLET | Refills: 0 | OUTPATIENT
Start: 2024-10-02

## 2024-10-02 RX ORDER — FENOFIBRATE 145 MG/1
145 TABLET, COATED ORAL DAILY
Qty: 90 TABLET | Refills: 0 | Status: SHIPPED | OUTPATIENT
Start: 2024-10-02

## 2024-10-02 NOTE — TELEPHONE ENCOUNTER
Clinic RN: Please contact patient because the medication is listed as historical or discontinued. Confirm patient is taking this medication. Document findings and route refill encounter to provider for approval or denial.    Joseph Ribeiro, RN, BSN, MSN  RN Lead

## 2024-10-02 NOTE — TELEPHONE ENCOUNTER
After chart review, it appears that the medication was discontinued on 9/27 when he had the cardioversion.     Discontinued Med Rec(No AVS / No eCancel) Rodrigue Lora RN 9/27/24 3442     Writer called patient for clarification and he stated that he was not told by them to stop the medication and has been still taking it. Routing to provider to review and advise. Should patient still be taking the medication?    Latoya PATEL RN  [facility] Triage Team

## 2024-10-03 NOTE — ANESTHESIA CARE TRANSFER NOTE
Patient: Martin Flaherty    Procedure: Procedure(s):  Anesthesia cardioversion  Transesophageal echocardiogram intraoperative       Diagnosis: Flutter-fibrillation [I48.91, I48.92]  Diagnosis Additional Information: No value filed.    Anesthesia Type:   General     Note:    Oropharynx: oropharynx clear of all foreign objects  Level of Consciousness: awake  Oxygen Supplementation: nasal cannula  Level of Supplemental Oxygen (L/min / FiO2): 4  Independent Airway: airway patency satisfactory and stable  Dentition: dentition unchanged  Vital Signs Stable: post-procedure vital signs reviewed and stable  Report to RN Given: handoff report given  Patient transferred to: Phase II  Comments: Diagnosis: A Fib.  Procedure: Cardioversion.  Cardiologist: Dr. Aguiar.  Location: Care Suites 17  Handoff Report: Identifed the Patient, Identified the Reponsible Provider, Reviewed the pertinent medical history, Discussed the surgical course, Reviewed Intra-OP anesthesia mangement and issues during anesthesia, Set expectations for post-procedure period and Allowed opportunity for questions and acknowledgement of understanding      Vitals:  Vitals Value Taken Time   BP     Temp     Pulse     Resp     SpO2         Electronically Signed By: AGUSTO Gregorio CRNA  October 3, 2024  8:49 AM

## 2024-10-15 ENCOUNTER — OFFICE VISIT (OUTPATIENT)
Dept: CARDIOLOGY | Facility: CLINIC | Age: 69
End: 2024-10-15
Payer: MEDICARE

## 2024-10-15 ENCOUNTER — PATIENT OUTREACH (OUTPATIENT)
Dept: CARE COORDINATION | Facility: CLINIC | Age: 69
End: 2024-10-15

## 2024-10-15 VITALS
OXYGEN SATURATION: 100 % | BODY MASS INDEX: 35.36 KG/M2 | HEIGHT: 70 IN | HEART RATE: 62 BPM | WEIGHT: 247 LBS | SYSTOLIC BLOOD PRESSURE: 120 MMHG | DIASTOLIC BLOOD PRESSURE: 77 MMHG | RESPIRATION RATE: 17 BRPM

## 2024-10-15 DIAGNOSIS — I48.0 PAROXYSMAL ATRIAL FIBRILLATION (H): ICD-10-CM

## 2024-10-15 DIAGNOSIS — R00.0 TACHYCARDIA: Primary | ICD-10-CM

## 2024-10-15 DIAGNOSIS — I10 BENIGN ESSENTIAL HYPERTENSION: ICD-10-CM

## 2024-10-15 DIAGNOSIS — G47.19 EXCESSIVE DAYTIME SLEEPINESS: ICD-10-CM

## 2024-10-15 DIAGNOSIS — R06.83 SNORING: ICD-10-CM

## 2024-10-15 DIAGNOSIS — I51.9 LV DYSFUNCTION: ICD-10-CM

## 2024-10-15 PROCEDURE — G2211 COMPLEX E/M VISIT ADD ON: HCPCS | Performed by: NURSE PRACTITIONER

## 2024-10-15 PROCEDURE — 99214 OFFICE O/P EST MOD 30 MIN: CPT | Performed by: NURSE PRACTITIONER

## 2024-10-15 PROCEDURE — 93000 ELECTROCARDIOGRAM COMPLETE: CPT | Performed by: NURSE PRACTITIONER

## 2024-10-15 NOTE — LETTER
10/15/2024    Stephania Laughlin MD  [address] [facility] [address]    RE: Martin Flaherty       Dear Colleague,     I had the pleasure of seeing Martin Flaherty in the [facility].    Electrophysiology Clinic Progress Note  Martin Flaherty MRN# [id number]   YOB: 1955 Age: 69 year old     Primary cardiologist: Dr. Castaneda    Reason for visit: Follow up AF    History of presenting illness:    Martin Flaherty is a pleasant 69 year old patient with past medical history significant for:    Symptomatic persistent atrial fibrillation/flutter: Reportedly initially diagnosed in [place] in 2002 with recurrence recently noted in 9/2024.  Previously maintained on flecainide 50 mg twice daily that was increased to 100 mg twice daily and restarted on anticoagulation.  Status post successful FAHEEM guided DCCV on 9/27/2024.  [id number] Score 2 (age and HTN) Eliquis 5 mg twice daily  SVT and PACs  Hypertension  CKD  Gout  Probable TERESE    Initially alerted our clinic with concerns for progressive shortness of breath on exertion as well as palpitations.  The original plan was for a 14-day Zio patch, but the patient called noting lightheadedness with heart racing and he was directed to the ED for which he declined.  Instead the patient was evaluated by my colleague Burak Cook PA-C on 9/23/2024  At the office visit his EKG did note AFL at a heart rate of 109 bpm.  Flecainide was increased to 100 mg twice daily from 50 mg twice daily, and he was started on anticoagulation and was arranged for him to undergo a FAHEEM guided cardioversion. On 9/27/2024 he underwent a successful FAHEEM guided cardioversion that was successful after 1 shock.    Today he returns for a postprocedure visit.  Thankfully he has had no recurrent atrial tachyarrhythmias.  The patient was wearing a Zio patch a week prior as well as a week post the procedure and in the week post there was no recurrence of AF/AFL.  Dr. Castaneda  reviewed the findings and recommended long-term anticoagulation and remaining on current dose of flecainide.  His symptoms of shortness of breath on exertion and palpitations have resolved.  We discussed alternative management strategies for rhythm control including alternative AAD's versus catheter ablation.  We also talked about risk factors for atrial fibrillation/flutter including sleep apnea.  The patient does endorse restless sleeping and snoring and would be open to a sleep referral.    Diagnotic studies:  EKG 10/15/2024: SR at 61 bpm, QRS duration 92 ms  Zio patch (14 days) 10/2024: 52% PAF/AFL burden with the longest lasting 6 days and 18 hours with average heart rate of 109 bpm.  (See image below)  FAHEEM 9/27/2024: LVEF 45-50% with no thrombus and normal biatrial size   EKG 9/23/2024: Atrial flutter   Stress echocardiogram 2020: negative ischemia or infarction. No Arrhythmia.  LVEF 65 to 70%              Assessment and Plan:     ASSESSMENT:    Symptomatic persistent atrial fibrillation/flutter   [id number] Score 2 (age and HTN) Eliquis 5 mg twice daily  Reportedly initially diagnosed in [place] in 2002 with recurrence recently noted in 9/2024.    Previously maintained on flecainide 50 mg twice daily that was increased to 100 mg twice daily and restarted on anticoagulation due to symptomatic recurrence 9/2024  Status post successful FAHEEM guided DCCV on 9/27/2024  Follow-up Zio patch noted no recurrent AF/AFL post DCCV.    Mild cardiomyopathy  FAHEEM noted LVEF of 45 to 50% % and previously was 65 to 70% in 2020    Probable TERESE    Hypertension  Well-controlled on lisinopril 2.5 mg daily and amlodipine 10 mg daily    PLAN:     Remain on present dose of flecainide at 100 mg twice daily and on AC with Eliquis 5 mg twice daily long-term  Pharmacy liaison consult for cost of DOACs  Recommend establishing care with EP MD in the future and if recurrent atrial tachyarrhythmias discuss alternative AAD's versus catheter  "ablation in 1 year or sooner if needed  Referral to sleep clinic  Update echocardiogram in approximately 1 month to assess LVEF after restoration of NSR    Addendum 10/16/2023:  Eliquis $346 for 30 days supply meeting a deductible and then once met the cost will be $47   Xarelto $342 for 30 days then $47 after deductible is met.        Orders this Visit:  Orders Placed This Encounter   Procedures     Follow-Up with Cardiology EP     Adult Sleep Eval & Management Referral     EKG 12-lead complete w/read - Clinics (performed today)     Echocardiogram Complete     No orders of the defined types were placed in this encounter.    There are no discontinued medications.    Today's clinic visit entailed:  Review of the result(s) of each unique test - EKG, Zio patch, FAHEEM  The following tests were independently interpreted by me as noted in my documentation: EKG  Ordering of each unique test  Prescription drug management  I spent a total of 32 minutes on the day of the visit.   Time spent by me doing chart review, history and exam, documentation and further activities per the note  Provider  Link to [facility] Help Grid     The level of medical decision making during this visit was of moderate complexity.      The longitudinal plan of care for the diagnosis(es)/condition(s) as documented were addressed during this visit. Due to the added complexity in care, I will continue to support Martin Flaherty in the subsequent management and with ongoing continuity of care.          Review of Systems:     Review of Systems:  Skin:  not assessed     Eyes:  not assessed    ENT:  not assessed    Respiratory:  Negative    Cardiovascular:  Negative    Gastroenterology: not assessed    Genitourinary:  not assessed    Musculoskeletal:  not assessed    Neurologic:  not assessed    Psychiatric:  not assessed    Heme/Lymph/Imm:       Endocrine:  Negative              Physical Exam:     Vitals: /77   Pulse 62   Resp 17   Ht 1.778 m (5' 10\")  "  Wt 112 kg (247 lb)   SpO2 100%   BMI 35.44 kg/m    Constitutional: Well nourished and in no apparent distress.  Eyes: Pupils equal, round.   HEENT: Normocephalic, atraumatic.   Neck: Supple.   Respiratory: Breathing non-labored. Lungs clear to auscultation bilaterally.  Cardiovascular:  Regular rate and rhythm, normal S1 and S2. No murmur   Skin: Warm, dry.   Extremities: No edema.  Neurologic: No gross motor deficits. Alert, awake, and oriented to person, place and time.  Psychiatric: Affect appropriate.        CURRENT MEDICATIONS:  Current Outpatient Medications   Medication Sig Dispense Refill     allopurinol (ZYLOPRIM) 300 MG tablet TAKE 1 TABLET BY MOUTH EVERY DAY 90 tablet 0     amLODIPine (NORVASC) 10 MG tablet Take 1 tablet (10 mg) by mouth daily 90 tablet 0     apixaban ANTICOAGULANT (ELIQUIS) 5 MG tablet Take 1 tablet (5 mg) by mouth 2 times daily. 60 tablet 4     fenofibrate (TRICOR) 145 MG tablet Take 1 tablet (145 mg) by mouth daily 90 tablet 0     flecainide (TAMBOCOR) 50 MG tablet Take 2 tablets (100 mg) by mouth 2 times daily. 120 tablet 3     lisinopril (ZESTRIL) 2.5 MG tablet Take 1 tablet (2.5 mg) by mouth daily 90 tablet 0     omeprazole (PRILOSEC) 20 MG DR capsule Take 1 capsule (20 mg) by mouth daily 90 capsule 3     simvastatin (ZOCOR) 40 MG tablet Take 1 tablet (40 mg) by mouth At Bedtime Appointment required for further refills 90 tablet 0     Choline Fenofibrate (FENOFIBRIC ACID) 135 MG CPDR Take 135 mg by mouth daily (Patient not taking: Reported on 10/15/2024)       famotidine (PEPCID) 20 MG tablet Take 1 tablet (20 mg) by mouth 2 times daily as needed (stomach upset/acid reflux) (Patient not taking: Reported on 12/12/2023) 60 tablet 3       ALLERGIES  No Known Allergies      PAST MEDICAL HISTORY:  Past Medical History:   Diagnosis Date     Acid reflux disease      Atrial tachycardia (H)      Benign essential hypertension      Chronic gout      History of atrial fibrillation      remote; not on AC     Hypertriglyceridemia      Obesity (BMI 30-39.9)      Pure hypercholesterolemia        PAST SURGICAL HISTORY:  Past Surgical History:   Procedure Laterality Date     ANESTHESIA CARDIOVERSION N/A 9/27/2024    Procedure: Anesthesia cardioversion;  Surgeon: GENERIC ANESTHESIA PROVIDER;  Location:  OR     APPENDECTOMY            EP STUDY, MODIFIED  2/2011    Attempted ablation 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     TRANSESOPHAGEAL ECHOCARDIOGRAM INTRAOPERATIVE N/A 9/27/2024    Procedure: Transesophageal echocardiogram intraoperative;  Surgeon: GENERIC ANESTHESIA PROVIDER;  Location:  OR       FAMILY HISTORY:  Family History   Problem Relation Age of Onset     Myocardial Infarction Mother         later in life     Cancer Father 48        metastatic at presentation; unknown primary     Hyperlipidemia Brother         multiple brothers     Diabetes No family hx of      Cerebrovascular Disease No family hx of      Coronary Artery Disease Early Onset No family hx of      Colon Cancer No family hx of      Prostate Cancer No family hx of        SOCIAL HISTORY:  Social History     Socioeconomic History     Marital status:      Spouse name: Lesley     Number of children: 3     Years of education: 14   Occupational History     Occupation: Retired - product support     Employer: [employer]   Tobacco Use     Smoking status: Never     Smokeless tobacco: Never   Vaping Use     Vaping status: Never Used   Substance and Sexual Activity     Alcohol use: Yes     Comment: very little     Drug use: No     Sexual activity: Yes     Partners: Female   Other Topics Concern     Caffeine Concern No     Sleep Concern No     Stress Concern No     Weight Concern No     Special Diet No     Exercise Yes     Comment: walks daily   Social History Narrative    .    3 adult children.    No grand children (as of 2023)    Walks daily.     Social Determinants of Health     Financial Resource Strain: High Risk  (10/31/2023)    Financial Resource Strain      Within the past 12 months, have you or your family members you live with been unable to get utilities (heat, electricity) when it was really needed?: Yes   Food Insecurity: Low Risk  (10/31/2023)    Food Insecurity      Within the past 12 months, did you worry that your food would run out before you got money to buy more?: No      Within the past 12 months, did the food you bought just not last and you didn t have money to get more?: No   Transportation Needs: Low Risk  (10/31/2023)    Transportation Needs      Within the past 12 months, has lack of transportation kept you from medical appointments, getting your medicines, non-medical meetings or appointments, work, or from getting things that you need?: No   Interpersonal Safety: Low Risk  (9/27/2024)    Interpersonal Safety      Do you feel physically and emotionally safe where you currently live?: Yes      Within the past 12 months, have you been hit, slapped, kicked or otherwise physically hurt by someone?: No      Within the past 12 months, have you been humiliated or emotionally abused in other ways by your partner or ex-partner?: No   Housing Stability: Low Risk  (10/31/2023)    Housing Stability      Do you have housing? : Yes      Are you worried about losing your housing?: No               Thank you for allowing me to participate in the care of your patient.      Sincerely,     AGUSTO Vergara [facility]  cc:   Stephania Laughlin MD  [address] [place] [id number]

## 2024-10-15 NOTE — PROGRESS NOTES
Electrophysiology Clinic Progress Note  Martin Flaherty MRN# [id number]   YOB: 1955 Age: 69 year old     Primary cardiologist: Dr. Castaneda    Reason for visit: Follow up AF    History of presenting illness:    Martin Flaherty is a pleasant 69 year old patient with past medical history significant for:    Symptomatic persistent atrial fibrillation/flutter: Reportedly initially diagnosed in [place] in 2002 with recurrence recently noted in 9/2024.  Previously maintained on flecainide 50 mg twice daily that was increased to 100 mg twice daily and restarted on anticoagulation.  Status post successful FAHEEM guided DCCV on 9/27/2024.  [id number] Score 2 (age and HTN) Eliquis 5 mg twice daily  SVT and PACs  Hypertension  CKD  Gout  Probable TERESE    Initially alerted our clinic with concerns for progressive shortness of breath on exertion as well as palpitations.  The original plan was for a 14-day Zio patch, but the patient called noting lightheadedness with heart racing and he was directed to the ED for which he declined.  Instead the patient was evaluated by my colleague Burak Cook PA-C on 9/23/2024  At the office visit his EKG did note AFL at a heart rate of 109 bpm.  Flecainide was increased to 100 mg twice daily from 50 mg twice daily, and he was started on anticoagulation and was arranged for him to undergo a FAHEEM guided cardioversion. On 9/27/2024 he underwent a successful FAHEEM guided cardioversion that was successful after 1 shock.    Today he returns for a postprocedure visit.  Thankfully he has had no recurrent atrial tachyarrhythmias.  The patient was wearing a Zio patch a week prior as well as a week post the procedure and in the week post there was no recurrence of AF/AFL.  Dr. Castaneda reviewed the findings and recommended long-term anticoagulation and remaining on current dose of flecainide.  His symptoms of shortness of breath on exertion and palpitations have resolved.  We discussed  alternative management strategies for rhythm control including alternative AAD's versus catheter ablation.  We also talked about risk factors for atrial fibrillation/flutter including sleep apnea.  The patient does endorse restless sleeping and snoring and would be open to a sleep referral.    Diagnotic studies:  EKG 10/15/2024: SR at 61 bpm, QRS duration 92 ms  Zio patch (14 days) 10/2024: 52% PAF/AFL burden with the longest lasting 6 days and 18 hours with average heart rate of 109 bpm.  (See image below)  FAHEEM 9/27/2024: LVEF 45-50% with no thrombus and normal biatrial size   EKG 9/23/2024: Atrial flutter   Stress echocardiogram 2020: negative ischemia or infarction. No Arrhythmia.  LVEF 65 to 70%              Assessment and Plan:     ASSESSMENT:    Symptomatic persistent atrial fibrillation/flutter   [id number] Score 2 (age and HTN) Eliquis 5 mg twice daily  Reportedly initially diagnosed in [place] in 2002 with recurrence recently noted in 9/2024.    Previously maintained on flecainide 50 mg twice daily that was increased to 100 mg twice daily and restarted on anticoagulation due to symptomatic recurrence 9/2024  Status post successful FAHEEM guided DCCV on 9/27/2024  Follow-up Zio patch noted no recurrent AF/AFL post DCCV.    Mild cardiomyopathy  FAHEEM noted LVEF of 45 to 50% % and previously was 65 to 70% in 2020    Probable TERESE    Hypertension  Well-controlled on lisinopril 2.5 mg daily and amlodipine 10 mg daily    PLAN:     Remain on present dose of flecainide at 100 mg twice daily and on AC with Eliquis 5 mg twice daily long-term  Pharmacy liaison consult for cost of DOACs  Recommend establishing care with EP MD in the future and if recurrent atrial tachyarrhythmias discuss alternative AAD's versus catheter ablation in 1 year or sooner if needed  Referral to sleep clinic  Update echocardiogram in approximately 1 month to assess LVEF after restoration of NSR    Addendum 10/16/2023:  Eliquis $346 for 30 days  "supply meeting a deductible and then once met the cost will be $47   Xarelto $342 for 30 days then $47 after deductible is met.        Orders this Visit:  Orders Placed This Encounter   Procedures    Follow-Up with Cardiology EP    Adult Sleep Eval & Management Referral    EKG 12-lead complete w/read - Clinics (performed today)    Echocardiogram Complete     No orders of the defined types were placed in this encounter.    There are no discontinued medications.    Today's clinic visit entailed:  Review of the result(s) of each unique test - EKG, Zio patch, FAHEEM  The following tests were independently interpreted by me as noted in my documentation: EKG  Ordering of each unique test  Prescription drug management  I spent a total of 32 minutes on the day of the visit.   Time spent by me doing chart review, history and exam, documentation and further activities per the note  Provider  Link to [facility] Help Grid     The level of medical decision making during this visit was of moderate complexity.      The longitudinal plan of care for the diagnosis(es)/condition(s) as documented were addressed during this visit. Due to the added complexity in care, I will continue to support Martin Flaherty in the subsequent management and with ongoing continuity of care.          Review of Systems:     Review of Systems:  Skin:  not assessed     Eyes:  not assessed    ENT:  not assessed    Respiratory:  Negative    Cardiovascular:  Negative    Gastroenterology: not assessed    Genitourinary:  not assessed    Musculoskeletal:  not assessed    Neurologic:  not assessed    Psychiatric:  not assessed    Heme/Lymph/Imm:       Endocrine:  Negative              Physical Exam:     Vitals: /77   Pulse 62   Resp 17   Ht 1.778 m (5' 10\")   Wt 112 kg (247 lb)   SpO2 100%   BMI 35.44 kg/m    Constitutional: Well nourished and in no apparent distress.  Eyes: Pupils equal, round.   HEENT: Normocephalic, atraumatic.   Neck: Supple. "   Respiratory: Breathing non-labored. Lungs clear to auscultation bilaterally.  Cardiovascular:  Regular rate and rhythm, normal S1 and S2. No murmur   Skin: Warm, dry.   Extremities: No edema.  Neurologic: No gross motor deficits. Alert, awake, and oriented to person, place and time.  Psychiatric: Affect appropriate.        CURRENT MEDICATIONS:  Current Outpatient Medications   Medication Sig Dispense Refill    allopurinol (ZYLOPRIM) 300 MG tablet TAKE 1 TABLET BY MOUTH EVERY DAY 90 tablet 0    amLODIPine (NORVASC) 10 MG tablet Take 1 tablet (10 mg) by mouth daily 90 tablet 0    apixaban ANTICOAGULANT (ELIQUIS) 5 MG tablet Take 1 tablet (5 mg) by mouth 2 times daily. 60 tablet 4    fenofibrate (TRICOR) 145 MG tablet Take 1 tablet (145 mg) by mouth daily 90 tablet 0    flecainide (TAMBOCOR) 50 MG tablet Take 2 tablets (100 mg) by mouth 2 times daily. 120 tablet 3    lisinopril (ZESTRIL) 2.5 MG tablet Take 1 tablet (2.5 mg) by mouth daily 90 tablet 0    omeprazole (PRILOSEC) 20 MG DR capsule Take 1 capsule (20 mg) by mouth daily 90 capsule 3    simvastatin (ZOCOR) 40 MG tablet Take 1 tablet (40 mg) by mouth At Bedtime Appointment required for further refills 90 tablet 0    Choline Fenofibrate (FENOFIBRIC ACID) 135 MG CPDR Take 135 mg by mouth daily (Patient not taking: Reported on 10/15/2024)      famotidine (PEPCID) 20 MG tablet Take 1 tablet (20 mg) by mouth 2 times daily as needed (stomach upset/acid reflux) (Patient not taking: Reported on 12/12/2023) 60 tablet 3       ALLERGIES  No Known Allergies      PAST MEDICAL HISTORY:  Past Medical History:   Diagnosis Date    Acid reflux disease     Atrial tachycardia (H)     Benign essential hypertension     Chronic gout     History of atrial fibrillation     remote; not on AC    Hypertriglyceridemia     Obesity (BMI 30-39.9)     Pure hypercholesterolemia        PAST SURGICAL HISTORY:  Past Surgical History:   Procedure Laterality Date    ANESTHESIA CARDIOVERSION N/A  9/27/2024    Procedure: Anesthesia cardioversion;  Surgeon: GENERIC ANESTHESIA PROVIDER;  Location: [facility] OR    APPENDECTOMY           EP STUDY, MODIFIED  2/2011    Attempted ablation 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    TRANSESOPHAGEAL ECHOCARDIOGRAM INTRAOPERATIVE N/A 9/27/2024    Procedure: Transesophageal echocardiogram intraoperative;  Surgeon: GENERIC ANESTHESIA PROVIDER;  Location:  OR       FAMILY HISTORY:  Family History   Problem Relation Age of Onset    Myocardial Infarction Mother         later in life    Cancer Father 48        metastatic at presentation; unknown primary    Hyperlipidemia Brother         multiple brothers    Diabetes No family hx of     Cerebrovascular Disease No family hx of     Coronary Artery Disease Early Onset No family hx of     Colon Cancer No family hx of     Prostate Cancer No family hx of        SOCIAL HISTORY:  Social History     Socioeconomic History    Marital status:      Spouse name: Lesley    Number of children: 3    Years of education: 14   Occupational History    Occupation: Retired - product support     Employer: [employer]   Tobacco Use    Smoking status: Never    Smokeless tobacco: Never   Vaping Use    Vaping status: Never Used   Substance and Sexual Activity    Alcohol use: Yes     Comment: very little    Drug use: No    Sexual activity: Yes     Partners: Female   Other Topics Concern    Caffeine Concern No    Sleep Concern No    Stress Concern No    Weight Concern No    Special Diet No    Exercise Yes     Comment: walks daily   Social History Narrative    .    3 adult children.    No grand children (as of 2023)    Walks daily.     Social Determinants of Health     Financial Resource Strain: High Risk (10/31/2023)    Financial Resource Strain     Within the past 12 months, have you or your family members you live with been unable to get utilities (heat, electricity) when it was really needed?: Yes   Food Insecurity: Low Risk  (10/31/2023)     Food Insecurity     Within the past 12 months, did you worry that your food would run out before you got money to buy more?: No     Within the past 12 months, did the food you bought just not last and you didn t have money to get more?: No   Transportation Needs: Low Risk  (10/31/2023)    Transportation Needs     Within the past 12 months, has lack of transportation kept you from medical appointments, getting your medicines, non-medical meetings or appointments, work, or from getting things that you need?: No   Interpersonal Safety: Low Risk  (9/27/2024)    Interpersonal Safety     Do you feel physically and emotionally safe where you currently live?: Yes     Within the past 12 months, have you been hit, slapped, kicked or otherwise physically hurt by someone?: No     Within the past 12 months, have you been humiliated or emotionally abused in other ways by your partner or ex-partner?: No   Housing Stability: Low Risk  (10/31/2023)    Housing Stability     Do you have housing? : Yes     Are you worried about losing your housing?: No

## 2024-10-15 NOTE — PATIENT INSTRUCTIONS
Today's Recommendations    Referral to sleep clinic  Will look into cost of blood thinners  Continue all medications without changes.  Please follow up with EP in 1 year or sooner.    Please send Yamlit message or call  for further questions or concerns.     It was a pleasure to see you today.     Mina-    Ann Marie Palumbo, APRN, CNP    EP [phone]  Device [phone]  General scheduling and after hours number [phone]  EP scheduling [phone]

## 2024-10-16 ENCOUNTER — MYC MEDICAL ADVICE (OUTPATIENT)
Dept: CARDIOLOGY | Facility: CLINIC | Age: 69
End: 2024-10-16
Payer: MEDICARE

## 2024-11-12 ENCOUNTER — APPOINTMENT (OUTPATIENT)
Dept: URBAN - METROPOLITAN AREA CLINIC 256 | Age: 69
Setting detail: DERMATOLOGY
End: 2024-11-12

## 2024-11-12 VITALS — WEIGHT: 240 LBS | HEIGHT: 70 IN

## 2024-11-12 DIAGNOSIS — L84 CORNS AND CALLOSITIES: ICD-10-CM

## 2024-11-12 DIAGNOSIS — L81.4 OTHER MELANIN HYPERPIGMENTATION: ICD-10-CM

## 2024-11-12 PROCEDURE — OTHER PHOTO-DOCUMENTATION: OTHER

## 2024-11-12 PROCEDURE — 17110 DESTRUCT B9 LESION 1-14: CPT

## 2024-11-12 PROCEDURE — OTHER BENIGN DESTRUCTION: OTHER

## 2024-11-12 PROCEDURE — OTHER MIPS QUALITY: OTHER

## 2024-11-12 PROCEDURE — OTHER COUNSELING: OTHER

## 2024-11-12 PROCEDURE — 99202 OFFICE O/P NEW SF 15 MIN: CPT | Mod: 25

## 2024-11-12 PROCEDURE — OTHER PATIENT SPECIFIC COUNSELING: OTHER

## 2024-11-12 ASSESSMENT — LOCATION SIMPLE DESCRIPTION DERM
LOCATION SIMPLE: LEFT FOOT
LOCATION SIMPLE: NOSE

## 2024-11-12 ASSESSMENT — LOCATION DETAILED DESCRIPTION DERM
LOCATION DETAILED: NASAL DORSUM
LOCATION DETAILED: LEFT LATERAL PLANTAR FOOT

## 2024-11-12 ASSESSMENT — LOCATION ZONE DERM
LOCATION ZONE: NOSE
LOCATION ZONE: FEET

## 2024-11-12 NOTE — HPI: WARTS (VERRUCA)
Is This A New Presentation, Or A Follow-Up?: Wart
Additional History: Become painful and enlarging in past 2 months, 6-7 years, 1 treatment in beginning

## 2024-11-12 NOTE — PROCEDURE: BENIGN DESTRUCTION
Add 52 Modifier (Optional): no
Medical Necessity Information: It is in your best interest to select a reason for this procedure from the list below. All of these items fulfill various CMS LCD requirements except the new and changing color options.
Detail Level: Detailed
Post-Care Instructions: I reviewed with the patient in detail post-care instructions. Patient is to wear sunprotection, and avoid picking at any of the treated lesions. Pt may apply Vaseline to crusted or scabbing areas.
Treatment Number (Will Not Render If 0): 0
Number Of Freeze-Thaw Cycles: 2 freeze-thaw cycles
Consent: The patient's consent was obtained including but not limited to risks of crusting, scabbing, blistering, scarring, darker or lighter pigmentary change, recurrence, incomplete removal and infection.
Duration Of Freeze Thaw-Cycle (Seconds): 5-10
Medical Necessity Clause: This procedure was medically necessary because the lesions that were treated were:

## 2024-11-12 NOTE — PROCEDURE: PATIENT SPECIFIC COUNSELING
- Informed patient symptoms are consistent with a corn, rather than a wart\\n- Discussed treatment options of destruction vs w/m; risks and benefits reviewed\\n- Patient decided to proceed with destruction of lesion as treatment today\\n- Informed patient trimming and using a pumice stone at home can help alleviate symptoms\\n- Advised patient to RTC as needed for further treatment\\n- Patient was agreeable
Detail Level: Zone

## 2024-11-27 DIAGNOSIS — M1A.0720 IDIOPATHIC CHRONIC GOUT OF LEFT FOOT WITHOUT TOPHUS: ICD-10-CM

## 2024-11-29 NOTE — TELEPHONE ENCOUNTER
Triage Patient Outreach    Attempt # 1    Was call answered?  No.  Left voicemail to return call to Triage at Primary Clinic    Latoya Rees RN

## 2024-11-29 NOTE — TELEPHONE ENCOUNTER
Clinic RN: Please investigate patient's chart or contact patient if the information cannot be found because patient should have run out of this medication on 12/22/2023. Confirm patient is taking this medication as prescribed. Document findings and route refill encounter to provider for approval or denial.  Bailey Johnson RN, BSN  [facility]

## 2024-12-02 RX ORDER — ALLOPURINOL 300 MG/1
1 TABLET ORAL DAILY
Qty: 90 TABLET | Refills: 0 | Status: SHIPPED | OUTPATIENT
Start: 2024-12-02

## 2024-12-02 NOTE — TELEPHONE ENCOUNTER
Called and spoke with pt to discuss.     He states that he has been taking this medication every day for 2 years.     He is unsure why the med is backdated as he states he has been refilling it.     Med pended, routing to PCP for review.     Thank you,  Kaylyn Ellington RN

## 2024-12-13 DIAGNOSIS — E78.5 HYPERLIPIDEMIA LDL GOAL <130: ICD-10-CM

## 2024-12-16 RX ORDER — SIMVASTATIN 40 MG
TABLET ORAL
Qty: 90 TABLET | Refills: 0 | OUTPATIENT
Start: 2024-12-16

## 2024-12-29 ENCOUNTER — PATIENT OUTREACH (OUTPATIENT)
Dept: CARE COORDINATION | Facility: CLINIC | Age: 69
End: 2024-12-29
Payer: MEDICARE

## 2025-02-13 PROBLEM — E66.812 CLASS 2 SEVERE OBESITY DUE TO EXCESS CALORIES WITH SERIOUS COMORBIDITY IN ADULT (H): Status: RESOLVED | Noted: 2023-12-12 | Resolved: 2025-02-13

## 2025-02-13 PROBLEM — E66.01 CLASS 2 SEVERE OBESITY DUE TO EXCESS CALORIES WITH SERIOUS COMORBIDITY IN ADULT (H): Status: RESOLVED | Noted: 2023-12-12 | Resolved: 2025-02-13

## 2025-02-14 PROBLEM — I47.19 ATRIAL TACHYCARDIA: Status: RESOLVED | Noted: 2022-04-25 | Resolved: 2025-02-14

## 2025-05-29 ENCOUNTER — OFFICE VISIT (OUTPATIENT)
Dept: PODIATRY | Facility: CLINIC | Age: 70
End: 2025-05-29
Payer: MEDICARE

## 2025-05-29 DIAGNOSIS — L84 CALLUS OF FOOT: Primary | ICD-10-CM

## 2025-05-29 DIAGNOSIS — M79.672 LEFT FOOT PAIN: ICD-10-CM

## 2025-05-29 NOTE — PROGRESS NOTES
ASSESSMENT:  Encounter Diagnoses   Name Primary?    Callus of foot Yes    Left foot pain      MEDICAL DECISION MAKING:  I discussed the cause and nature of calluses.  The focus of treatment is reducing pressure on the skin.    Recommendations:  Adequate shoe width  Softer and stretchable material  Donut padding or similar  Self filing a moisturizing    I offered to pare the lesion down and remove the nucleus, yet explained this requires him to sign an ABN waiver form.  I explained there is a potential out-of-pocket responsibility.  He was fine with this and signed the form.    Seeing a #15 blade, the left foot hyperkeratotic lesion was pared down to pink, healthy epithelium.  The central nucleus was cored out without bleeding.    Follow-up on an as-needed basis.    Disclaimer: This note consists of symbols derived from keyboarding, dictation and/or voice recognition software. As a result, there may be errors in the script that have gone undetected. Please consider this when interpreting information found in this chart.    Leon Young DPM, FACFAS, MS    [place] [facility]      ____________________________________________________________________    HPI:       Martin Flaherty presents today reporting a callus on the left foot.  This causes burning pain rated a 5 out of 10.  Pain is intermittent and related to weightbearing activities.  He has tried to file the callus down.  Exercise includes walking 2 to 3 miles daily.    *  Past Medical History:   Diagnosis Date    Acid reflux disease     Benign essential hypertension     BPH (benign prostatic hyperplasia)     Cardiomyopathy (H)     Chronic gout     Hypertriglyceridemia     Obesity (BMI 30-39.9)     Persistent atrial fibrillation (H)     s/p successful FAHEEM guided DCCV 9/27/2024    Pure hypercholesterolemia    *  *  Past Surgical History:   Procedure Laterality Date    ANESTHESIA CARDIOVERSION N/A 9/27/2024    Procedure: Anesthesia  cardioversion;  Surgeon: GENERIC ANESTHESIA PROVIDER;  Location: [facility] OR    APPENDECTOMY           EP STUDY, MODIFIED  2/2011    Attempted ablation 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    TRANSESOPHAGEAL ECHOCARDIOGRAM INTRAOPERATIVE N/A 9/27/2024    Procedure: Transesophageal echocardiogram intraoperative;  Surgeon: GENERIC ANESTHESIA PROVIDER;  Location: [facility] OR   *  *  Current Outpatient Medications   Medication Sig Dispense Refill    alfuzosin ER (UROXATRAL) 10 MG 24 hr tablet Take 1 tablet (10 mg) by mouth daily.      allopurinol (ZYLOPRIM) 300 MG tablet Take 1 tablet (300 mg) by mouth daily. 90 tablet 0    amLODIPine (NORVASC) 5 MG tablet Take 1 tablet (5 mg) by mouth daily. 90 tablet 3    apixaban ANTICOAGULANT (ELIQUIS) 5 MG tablet Take 1 tablet (5 mg) by mouth 2 times daily. 180 tablet 3    fenofibrate (TRICOR) 145 MG tablet Take 1 tablet (145 mg) by mouth daily. 90 tablet 3    flecainide (TAMBOCOR) 50 MG tablet Take 2 tablets (100 mg) by mouth 2 times daily. 360 tablet 3    omeprazole (PRILOSEC) 20 MG DR capsule Take 1 capsule (20 mg) by mouth daily. 90 capsule 3    simvastatin (ZOCOR) 40 MG tablet Take 1 tablet (40 mg) by mouth at bedtime. 90 tablet 3         EXAM:    Vitals: There were no vitals taken for this visit.  BMI: There is no height or weight on file to calculate BMI.  Vasc:      Pedal pulses are  palpable for the dorsalis pedis posterior tibial artery, bilateral foot.  Capillary fill time </= 3 seconds  Pedal skin appears well-perfused  Neuro:      Light touch sensation intact to all sensory nerve distributions, bilateral foot.  No apparent spastic contractures or other deformity secondary to neurologic compromise.  Derm:      Nucleated hyperkeratotic lesion lateral left fifth metatarsal head region.  No hyperpigmentation or signs of ulceration.

## 2025-05-29 NOTE — LETTER
5/29/2025      Martin Flaherty  [id number] [facility] [id number]      Dear Colleague,    Thank you for referring your patient, Martin Flaherty, to the [facility]. Please see a copy of my visit note below.    ASSESSMENT:  Encounter Diagnoses   Name Primary?     Callus of foot Yes     Left foot pain      MEDICAL DECISION MAKING:  I discussed the cause and nature of calluses.  The focus of treatment is reducing pressure on the skin.    Recommendations:  Adequate shoe width  Softer and stretchable material  Donut padding or similar  Self filing a moisturizing    I offered to pare the lesion down and remove the nucleus, yet explained this requires him to sign an ABN waiver form.  I explained there is a potential out-of-pocket responsibility.  He was fine with this and signed the form.    Seeing a #15 blade, the left foot hyperkeratotic lesion was pared down to pink, healthy epithelium.  The central nucleus was cored out without bleeding.    Follow-up on an as-needed basis.    Disclaimer: This note consists of symbols derived from keyboarding, dictation and/or voice recognition software. As a result, there may be errors in the script that have gone undetected. Please consider this when interpreting information found in this chart.    Leon Young DPM, FACFAS, MS    [place] [facility]le [facility]      ____________________________________________________________________    HPI:       Martin Flaherty presents today reporting a callus on the left foot.  This causes burning pain rated a 5 out of 10.  Pain is intermittent and related to weightbearing activities.  He has tried to file the callus down.  Exercise includes walking 2 to 3 miles daily.    *  Past Medical History:   Diagnosis Date     Acid reflux disease      Benign essential hypertension      BPH (benign prostatic hyperplasia)      Cardiomyopathy (H)      Chronic gout      Hypertriglyceridemia       Obesity (BMI 30-39.9)      Persistent atrial fibrillation (H)     s/p successful FAHEEM guided DCCV 9/27/2024     Pure hypercholesterolemia    *  *  Past Surgical History:   Procedure Laterality Date     ANESTHESIA CARDIOVERSION N/A 9/27/2024    Procedure: Anesthesia cardioversion;  Surgeon: GENERIC ANESTHESIA PROVIDER;  Location: [facility] OR     APPENDECTOMY            EP STUDY, MODIFIED  2/2011    Attempted ablation 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     TRANSESOPHAGEAL ECHOCARDIOGRAM INTRAOPERATIVE N/A 9/27/2024    Procedure: Transesophageal echocardiogram intraoperative;  Surgeon: GENERIC ANESTHESIA PROVIDER;  Location: [facility] OR   *  *  Current Outpatient Medications   Medication Sig Dispense Refill     alfuzosin ER (UROXATRAL) 10 MG 24 hr tablet Take 1 tablet (10 mg) by mouth daily.       allopurinol (ZYLOPRIM) 300 MG tablet Take 1 tablet (300 mg) by mouth daily. 90 tablet 0     amLODIPine (NORVASC) 5 MG tablet Take 1 tablet (5 mg) by mouth daily. 90 tablet 3     apixaban ANTICOAGULANT (ELIQUIS) 5 MG tablet Take 1 tablet (5 mg) by mouth 2 times daily. 180 tablet 3     fenofibrate (TRICOR) 145 MG tablet Take 1 tablet (145 mg) by mouth daily. 90 tablet 3     flecainide (TAMBOCOR) 50 MG tablet Take 2 tablets (100 mg) by mouth 2 times daily. 360 tablet 3     omeprazole (PRILOSEC) 20 MG DR capsule Take 1 capsule (20 mg) by mouth daily. 90 capsule 3     simvastatin (ZOCOR) 40 MG tablet Take 1 tablet (40 mg) by mouth at bedtime. 90 tablet 3         EXAM:    Vitals: There were no vitals taken for this visit.  BMI: There is no height or weight on file to calculate BMI.  Vasc:      Pedal pulses are  palpable for the dorsalis pedis posterior tibial artery, bilateral foot.  Capillary fill time </= 3 seconds  Pedal skin appears well-perfused  Neuro:      Light touch sensation intact to all sensory nerve distributions, bilateral foot.  No apparent spastic contractures or other deformity secondary to neurologic  compromise.  Derm:      Nucleated hyperkeratotic lesion lateral left fifth metatarsal head region.  No hyperpigmentation or signs of ulceration.        Again, thank you for allowing me to participate in the care of your patient.        Sincerely,        Leon Young DPM    Electronically signed

## 2025-05-29 NOTE — PATIENT INSTRUCTIONS
Thank you for choosing [facility] / [facility]le Surgery!    DR. BROWN'S CLINIC LOCATIONS:     [facility] TRIAGE LINE: [phone]   [address] APPOINTMENTS: [phone]   [place] [address] RADIOLOGY: [phone]   (Every other Tues - Wed - Fri PM) SET UP SURGERY: [phone]    PHYSICAL THERAPY: [phone]   [place] SPECIALTY BILLING QUESTIONS: [phone] [address] FAX: [phone]   [place] [address]    (Thurs & Fri AM)       CALLUS / CORNS / IPKs  When there is excessive friction or pressure on the skin, the body responds by making the skin thicker to protect the deeper structures from becoming exposed. While this works well to protect the deeper structures, the thickened skin can increase pressure and pain.    CALLUS: Flat, diffuse thickening are simple calluses and they are usually caused by friction. Often these are the result of rubbing on a shoe or going barefoot.    CORNS: Calluses with a central core between the toes are called corns. These result from prominent joints on adjacent toes rubbing together. Theses are a symptom of bone malalignment and will always recur unless the underlying bones are addressed surgically.    IPKs: Calluses with a central core on the ball of the foot are usually IPKs (intractable plantar keratosis). These are caused by excessive pressure from the metatarsals, the bones that make up the ball of the foot. Often one of these bones is too long or too prominent.  Again, these will always recur unless the underlying bone issue is addressed. There is no cure for these. They will either go away by themselves, recur, or more could develop.    ROUTINE MAINTENANCE  1. File them down with a pumice stone or callus file a couple times a week.   2. An electric callus removing device. Amope Pedi Perfect Electronic Pedicure Foot File and Callus Remover can be a good option.   3. Lotion can be applied to soften the callus. A urea  based cream such as Kersal or Vanicream or thicker cream with shea butter are good options.  4. Toe spacers or toe covers can be used for corns, gel pads can be used for other lesions on the bottom of the foot.   If there is a surgical pathology noted, such as a prominent bone, often this needs to be addressed surgically to minimize recurrence. However, sometimes the lesion simply migrates to another spot after surgery, so it is not a guaranteed cure.     **If you come back to clinic for treatment, insurance does not cover it, and you would be billed. This charge could range from $100 - $227**

## 2025-06-26 DIAGNOSIS — M1A.9XX0 CHRONIC GOUT WITHOUT TOPHUS, UNSPECIFIED CAUSE, UNSPECIFIED SITE: ICD-10-CM

## 2025-06-26 RX ORDER — ALLOPURINOL 300 MG/1
1 TABLET ORAL DAILY
Qty: 90 TABLET | Refills: 0 | Status: SHIPPED | OUTPATIENT
Start: 2025-06-26

## 2025-07-07 ENCOUNTER — VIRTUAL VISIT (OUTPATIENT)
Dept: INTERNAL MEDICINE | Facility: CLINIC | Age: 70
End: 2025-07-07
Payer: MEDICARE

## 2025-07-07 DIAGNOSIS — Z82.49 FAMILY HISTORY OF LONG QT SYNDROME: ICD-10-CM

## 2025-07-07 DIAGNOSIS — G47.33 OSA ON CPAP: Primary | ICD-10-CM

## 2025-07-07 PROCEDURE — 98005 SYNCH AUDIO-VIDEO EST LOW 20: CPT | Performed by: INTERNAL MEDICINE

## 2025-07-07 NOTE — PROGRESS NOTES
VIDEO VISIT                                                       ASSESSMENT/PLAN                                                      (G47.33) TERESE on CPAP  (primary encounter diagnosis)  Comment: doing well on and tolerating CPAP; feeling better/more rested during the day.    (Z82.49) Family history of long QT syndrome  Comment: patient's EKGs  without evidence of long QT.    Total time of video call between patient and provider was 6 minutes (10:50-10:56am). Provider location: office. Patient location: home. Platform: [employer].     Stephania Laughlin MD   [facility]  [address] [place] [id number]  T: [phone], F: [phone]    SUBJECTIVE                                                      Martin Flaherty is a very pleasant 70 year old male who requested a video visit for follow-up:    Patient was made aware that this visit will be billed the same as an in-person visit and has given verbal consent to proceed with this video visit.     Patient was recently diagnosed with TERESE and started on CPAP. It took him several weeks to get used to it but is now sleeping mostly soundly with it on. He has noticed that he is taking less naps during the day and able to stay awake while reading.     Patient also reports that his niece (brother's daughter) was recently diagnosed with long QT syndrome, which is believed to have been passed to her by her father. Patient's siblings have been tested - only one has come back positive for long QT. Reviewed patient's EKGs - no evidence of long QT.    OBJECTIVE                                                       Vitals: No vitals were obtained today due to virtual visit.    General: appears healthy, alert and no distress  Psychiatric: mentation normal, affect normal/bright, judgement and insight intact, normal speech and appearance well-groomed    ---    (Note was completed, in part, with [employer] voice-recognition software. Documentation reviewed, but some  grammatical, spelling, and word errors may remain.)

## 2025-07-28 ENCOUNTER — HOSPITAL ENCOUNTER (EMERGENCY)
Facility: CLINIC | Age: 70
Discharge: HOME | End: 2025-07-28
Attending: EMERGENCY MEDICINE | Admitting: EMERGENCY MEDICINE
Payer: MEDICARE

## 2025-07-28 VITALS
HEART RATE: 59 BPM | TEMPERATURE: 97.5 F | RESPIRATION RATE: 16 BRPM | OXYGEN SATURATION: 98 % | DIASTOLIC BLOOD PRESSURE: 84 MMHG | SYSTOLIC BLOOD PRESSURE: 125 MMHG

## 2025-07-28 DIAGNOSIS — R42 LIGHTHEADEDNESS: ICD-10-CM

## 2025-07-28 DIAGNOSIS — N18.31 CHRONIC RENAL FAILURE, STAGE 3A (H): ICD-10-CM

## 2025-07-28 DIAGNOSIS — Z86.79 HISTORY OF ATRIAL FIBRILLATION: ICD-10-CM

## 2025-07-28 DIAGNOSIS — R00.2 PALPITATIONS: Primary | ICD-10-CM

## 2025-07-28 DIAGNOSIS — Z86.79 HISTORY OF PAROXYSMAL ATRIAL TACHYCARDIA: ICD-10-CM

## 2025-07-28 LAB
ANION GAP SERPL CALCULATED.3IONS-SCNC: 9 MMOL/L (ref 7–15)
ATRIAL RATE - MUSE: NORMAL BPM
BASOPHILS # BLD AUTO: 0 10E3/UL (ref 0–0.2)
BASOPHILS NFR BLD AUTO: 1 %
BUN SERPL-MCNC: 14.1 MG/DL (ref 8–23)
CALCIUM SERPL-MCNC: 9.2 MG/DL (ref 8.8–10.4)
CHLORIDE SERPL-SCNC: 108 MMOL/L (ref 98–107)
CREAT SERPL-MCNC: 1.33 MG/DL (ref 0.67–1.17)
DIASTOLIC BLOOD PRESSURE - MUSE: NORMAL MMHG
EGFRCR SERPLBLD CKD-EPI 2021: 58 ML/MIN/1.73M2
EOSINOPHIL # BLD AUTO: 0.1 10E3/UL (ref 0–0.7)
EOSINOPHIL NFR BLD AUTO: 1 %
ERYTHROCYTE [DISTWIDTH] IN BLOOD BY AUTOMATED COUNT: 14 % (ref 10–15)
GLUCOSE SERPL-MCNC: 113 MG/DL (ref 70–99)
HCO3 SERPL-SCNC: 24 MMOL/L (ref 22–29)
HCT VFR BLD AUTO: 40.6 % (ref 40–53)
HGB BLD-MCNC: 14.5 G/DL (ref 13.3–17.7)
IMM GRANULOCYTES # BLD: 0 10E3/UL
IMM GRANULOCYTES NFR BLD: 0 %
INTERPRETATION ECG - MUSE: NORMAL
LYMPHOCYTES # BLD AUTO: 0.8 10E3/UL (ref 0.8–5.3)
LYMPHOCYTES NFR BLD AUTO: 22 %
MCH RBC QN AUTO: 32.5 PG (ref 26.5–33)
MCHC RBC AUTO-ENTMCNC: 35.7 G/DL (ref 31.5–36.5)
MCV RBC AUTO: 91 FL (ref 78–100)
MONOCYTES # BLD AUTO: 0.3 10E3/UL (ref 0–1.3)
MONOCYTES NFR BLD AUTO: 8 %
NEUTROPHILS # BLD AUTO: 2.4 10E3/UL (ref 1.6–8.3)
NEUTROPHILS NFR BLD AUTO: 68 %
NRBC # BLD AUTO: 0 10E3/UL
NRBC BLD AUTO-RTO: 0 /100
P AXIS - MUSE: NORMAL DEGREES
PLATELET # BLD AUTO: 135 10E3/UL (ref 150–450)
POTASSIUM SERPL-SCNC: 3.7 MMOL/L (ref 3.4–5.3)
PR INTERVAL - MUSE: NORMAL MS
QRS DURATION - MUSE: 86 MS
QT - MUSE: 492 MS
QTC - MUSE: 495 MS
R AXIS - MUSE: 40 DEGREES
RBC # BLD AUTO: 4.46 10E6/UL (ref 4.4–5.9)
SODIUM SERPL-SCNC: 141 MMOL/L (ref 135–145)
SYSTOLIC BLOOD PRESSURE - MUSE: NORMAL MMHG
T AXIS - MUSE: 70 DEGREES
TROPONIN T SERPL HS-MCNC: 13 NG/L
TROPONIN T SERPL HS-MCNC: 13 NG/L
TSH SERPL DL<=0.005 MIU/L-ACNC: 2.84 UIU/ML (ref 0.3–4.2)
VENTRICULAR RATE- MUSE: 61 BPM
WBC # BLD AUTO: 3.5 10E3/UL (ref 4–11)

## 2025-07-28 PROCEDURE — 85041 AUTOMATED RBC COUNT: CPT | Performed by: EMERGENCY MEDICINE

## 2025-07-28 PROCEDURE — 99284 EMERGENCY DEPT VISIT MOD MDM: CPT | Performed by: EMERGENCY MEDICINE

## 2025-07-28 PROCEDURE — 36415 COLL VENOUS BLD VENIPUNCTURE: CPT | Performed by: EMERGENCY MEDICINE

## 2025-07-28 PROCEDURE — 84443 ASSAY THYROID STIM HORMONE: CPT | Performed by: EMERGENCY MEDICINE

## 2025-07-28 PROCEDURE — 80048 BASIC METABOLIC PNL TOTAL CA: CPT | Performed by: EMERGENCY MEDICINE

## 2025-07-28 PROCEDURE — 85004 AUTOMATED DIFF WBC COUNT: CPT | Performed by: EMERGENCY MEDICINE

## 2025-07-28 PROCEDURE — 84484 ASSAY OF TROPONIN QUANT: CPT | Mod: 91 | Performed by: EMERGENCY MEDICINE

## 2025-07-28 PROCEDURE — 93005 ELECTROCARDIOGRAM TRACING: CPT

## 2025-07-28 ASSESSMENT — ACTIVITIES OF DAILY LIVING (ADL)
ADLS_ACUITY_SCORE: 52

## 2025-07-28 NOTE — ED PROVIDER NOTES
Emergency Department Note      History of Present Illness     Chief Complaint   Palpitations      HPI   Martin Flaherty is a 70 year old male, anticoagulated on Eliquis, with a past medical history of atrial fibrillation, hypertension, hypercholesterolemia, obesity, and cardiomyopathy who presents to the emergency department for evaluation of palpitations. He states that a couple of weeks ago, he began to have intermittent palpitations, which he describes as an unusual feeling in his chest with accompanying lightheadedness. He states that it feels somewhat dissimilar to his prior episodes of atrial fibrillation. He has 6-7 episodes of the palpitations per day, and each lasts variably between 30 seconds and several hours. Patient mentions that sometimes the palpitations improve when coughing. He describes his most recent episode last night as constant over a couple of hours. He has not visited the ED for these symptoms until today due to the palpitations usually resolving shortly after onset. He denies any shortness of breath and tightness or pain in his chest. He does not take his pulse when these episodes occur. Of note, the patient was in atrial fibrillation last year and had to be cardioverted with a previous history of attempted ablation.     Independent Historian   None    Review of External Notes   I reviewed the patient's progress note with Dr. Castaneda from 12/12/23.    Past Medical History     Medical History and Problem List   Acid reflux disease  Atrial tachycardia  Benign essential hypertension  BPH   Cardiomyopathy  Chronic gout  Hypertriglyceridemia  Obesity  TERESE on CPAP  Persistent atrial fibrillation  Pure hypercholesterolemia    Medications   alfuzosin  allopurinol  amlodipine  apixaban  fenofibrate  Flecainide  Fenofibric acid  Hydrocodone-acetaminophen  lisinopril  omeprazole  Simvastatin  tamsulosin    Surgical History   Anesthesesia cardioversion  Appendectomy  Attempted ablatin of r atrial  PA0C near the AV node region  Transesophageal echocardiogram interoperative    Physical Exam     Patient Vitals for the past 24 hrs:   BP Temp Temp src Pulse Resp SpO2   07/28/25 1107 -- -- -- 63 -- 96 %   07/28/25 0857 -- -- -- -- -- 97 %   07/28/25 0855 128/80 97.5  F (36.4  C) Temporal 62 16 --     Physical Exam  Nursing note and vitals reviewed.    Constitutional:  Appears comfortable.    HENT:                Nose normal.  No discharge.      Oral mucosa is moist.  Cardiovascular:  Normal rate, regular rhythm with normal S1 and S2.      Normal heart sounds and peripheral pulses 2+ and equal.       No murmur or karthikeyan.  Pulmonary:  Effort normal and breath sounds clear to auscultation bilaterally.     GI:    Soft. No distension and no mass. No tenderness.   Musculoskeletal:  Normal range of motion. No extremity deformity.     No edema and no tenderness.    Neurological:   Alert and oriented. No focal weakness.  Skin:    Skin is warm and dry. No rash noted.   Psychiatric:   Behavior is normal. Appropriate mood and affect.     Judgment and thought content normal.      Diagnostics     Lab Results   Labs Ordered and Resulted from Time of ED Arrival to Time of ED Departure   BASIC METABOLIC PANEL (LIMITED OCCURRENCES) - Abnormal       Result Value    Sodium 141      Potassium 3.7      Chloride 108 (*)     Carbon Dioxide (CO2) 24      Anion Gap 9      Urea Nitrogen 14.1      Creatinine 1.33 (*)     GFR Estimate 58 (*)     Calcium 9.2      Glucose 113 (*)    CBC WITH PLATELETS AND DIFFERENTIAL - Abnormal    WBC Count 3.5 (*)     RBC Count 4.46      Hemoglobin 14.5      Hematocrit 40.6      MCV 91      MCH 32.5      MCHC 35.7      RDW 14.0      Platelet Count 135 (*)     % Neutrophils 68      % Lymphocytes 22      % Monocytes 8      % Eosinophils 1      % Basophils 1      % Immature Granulocytes 0      NRBCs per 100 WBC 0      Absolute Neutrophils 2.4      Absolute Lymphocytes 0.8      Absolute Monocytes 0.3      Absolute  Eosinophils 0.1      Absolute Basophils 0.0      Absolute Immature Granulocytes 0.0      Absolute NRBCs 0.0     TSH WITH FREE T4 REFLEX - Normal    TSH 2.84     TROPONIN T, HIGH SENSITIVITY - Normal    Troponin T, High Sensitivity 13     TROPONIN T, HIGH SENSITIVITY - Normal    Troponin T, High Sensitivity 13         Imaging   No orders to display       EKG   ECG taken at 0902, ECG read at 0904.  Normal sinus rhythm.   Compare to sinus rhythm with possible anterior infarct on EKG from 9/27/24  Rate 61 bpm. WA interval * ms. QRS duration 86 ms. QT/QTc 492/495 ms. P-R-T axes * 40 70.    Independent Interpretation   None    ED Course      Medications Administered   Medications - No data to display    Procedures   Procedures     Discussion of Management   None    ED Course   ED Course as of 07/28/25 1314   Mon Jul 28, 2025   0912 I obtained history and examined the patient as noted above.     1010 I rechecked with the patient and updated.    1255 I rechecked the patient and explained findings. We discussed plans for discharge and the patient is agreeable with this plan.       Additional Documentation  None    Medical Decision Making / Diagnosis     CMS Diagnoses: None    MIPS   None       [facility]   Martin SONIA Flaherty is a 70 year old male with a history of both paroxysmal atrial tachycardia and A-fib comes in with episodes over the last couple of weeks of palpitations.  He said he feels lightheaded when it happens.  He is never taken his pulse.  While he was here in the ER, he said it had an episode that lasted about 10 minutes but the only thing on the monitor that was picked up was a few beats of bigeminy.  He did not have any SVT here.  The patient is on amiodarone and we will have him continue that and I attempted to get a hold of his cardiologist but he was not in today.  I have ordered a mail out Zio patch with follow-up with Dr. Castaneda.  I did teach him how to take his pulse and I recommended he keep a journal of his  pulse rate and whether was regular or irregular and bring that into his appointment with his cardiologist.  He does have chronic renal failure and that stable.  EKG was otherwise unremarkable.    Stay hydrated, you will get the Zio patch in the mail and put it on and follow the directions.  You will mail it back in after 2 weeks.  See Dr. Castaneda for a follow-up and continue your current medications.  Take your pulse when you are feeling 1 of these episodes and note whether it is regular or irregular and what the number is whether it is 70 beats a minute or 120 or 130 beats a minute.  Write these episodes down and keep a journal.  If you pass out you need to return to the ER, if you get a episode where your pulse rate is over 120 and it is persistent, also return to the ER.      Disposition   The patient was discharged.     Diagnosis     ICD-10-CM    1. Palpitations  R00.2 ZIO PATCH MAIL OUT      2. History of paroxysmal atrial tachycardia  Z86.79 ZIO PATCH MAIL OUT      3. History of atrial fibrillation  Z86.79 ZIO PATCH MAIL OUT      4. Chronic renal failure, stage 3a (H)  N18.31       5. Lightheadedness  R42     With episodes of palpitations           Discharge Medications   New Prescriptions    No medications on file         Scribe Disclosure:  I, Naveed Song, am serving as a scribe at 9:20 AM on 7/28/2025 to document services personally performed by Lili Luke MD based on my observations and the provider's statements to me.     Scribe Disclosure:  I, Brian Valentine, am serving as a scribe  for Naveed Song at 9:33 AM on 7/28/2025 to document services personally performed by Lili Luke MD based on my observations and the provider's statements to me.          Lili Luke MD  07/28/25 9981

## 2025-07-28 NOTE — ED TRIAGE NOTES
Palpitations intermittently for a couple of weeks- describes feeling like his energy is down, and gets lightheaded when it happens, he states moving around helps to alleviate the palpitations. Denies pain or difficulty breathing.

## 2025-07-28 NOTE — DISCHARGE INSTRUCTIONS
Stay hydrated, you will get the Zio patch in the mail and put it on and follow the directions.  You will mail it back in after 2 weeks.  See Dr. Castaneda for a follow-up and continue your current medications.  Take your pulse when you are feeling 1 of these episodes and note whether it is regular or irregular and what the number is whether it is 70 beats a minute or 120 or 130 beats a minute.  Write these episodes down and keep a journal.  If you pass out you need to return to the ER, if you get a episode where your pulse rate is over 120 and it is persistent, also return to the ER.

## 2025-08-26 ENCOUNTER — TELEPHONE (OUTPATIENT)
Dept: INTERNAL MEDICINE | Facility: CLINIC | Age: 70
End: 2025-08-26
Payer: MEDICARE

## 2025-08-29 ENCOUNTER — APPOINTMENT (OUTPATIENT)
Dept: URBAN - METROPOLITAN AREA CLINIC 256 | Age: 70
Setting detail: DERMATOLOGY
End: 2025-08-29

## 2025-08-29 VITALS — WEIGHT: 240 LBS | HEIGHT: 70 IN

## 2025-08-29 DIAGNOSIS — L84 CORNS AND CALLOSITIES: ICD-10-CM

## 2025-08-29 DIAGNOSIS — Z71.89 OTHER SPECIFIED COUNSELING: ICD-10-CM

## 2025-08-29 DIAGNOSIS — L82.1 OTHER SEBORRHEIC KERATOSIS: ICD-10-CM

## 2025-08-29 DIAGNOSIS — D49.2 NEOPLASM OF UNSPECIFIED BEHAVIOR OF BONE, SOFT TISSUE, AND SKIN: ICD-10-CM

## 2025-08-29 DIAGNOSIS — L57.8 OTHER SKIN CHANGES DUE TO CHRONIC EXPOSURE TO NONIONIZING RADIATION: ICD-10-CM

## 2025-08-29 DIAGNOSIS — L91.8 OTHER HYPERTROPHIC DISORDERS OF THE SKIN: ICD-10-CM

## 2025-08-29 DIAGNOSIS — D22 MELANOCYTIC NEVI: ICD-10-CM

## 2025-08-29 DIAGNOSIS — D18.0 HEMANGIOMA: ICD-10-CM

## 2025-08-29 PROBLEM — D18.01 HEMANGIOMA OF SKIN AND SUBCUTANEOUS TISSUE: Status: ACTIVE | Noted: 2025-08-29

## 2025-08-29 PROBLEM — D22.62 MELANOCYTIC NEVI OF LEFT UPPER LIMB, INCLUDING SHOULDER: Status: ACTIVE | Noted: 2025-08-29

## 2025-08-29 PROBLEM — D22.5 MELANOCYTIC NEVI OF TRUNK: Status: ACTIVE | Noted: 2025-08-29

## 2025-08-29 PROCEDURE — 11102 TANGNTL BX SKIN SINGLE LES: CPT

## 2025-08-29 PROCEDURE — OTHER PATIENT SPECIFIC COUNSELING: OTHER

## 2025-08-29 PROCEDURE — OTHER COUNSELING: OTHER

## 2025-08-29 PROCEDURE — OTHER SKIN TAG REMOVAL: OTHER

## 2025-08-29 PROCEDURE — 11200 RMVL SKIN TAGS UP TO&INC 15: CPT | Mod: 59

## 2025-08-29 PROCEDURE — OTHER BIOPSY BY SHAVE METHOD: OTHER

## 2025-08-29 PROCEDURE — 99213 OFFICE O/P EST LOW 20 MIN: CPT | Mod: 25

## 2025-08-29 PROCEDURE — 11103 TANGNTL BX SKIN EA SEP/ADDL: CPT

## 2025-08-29 PROCEDURE — OTHER PHOTO-DOCUMENTATION: OTHER

## 2025-08-29 PROCEDURE — OTHER MIPS QUALITY: OTHER

## 2025-08-29 PROCEDURE — OTHER SUNSCREEN RECOMMENDATIONS: OTHER

## 2025-08-29 ASSESSMENT — LOCATION SIMPLE DESCRIPTION DERM
LOCATION SIMPLE: LOWER BACK
LOCATION SIMPLE: LEFT CHEEK
LOCATION SIMPLE: RIGHT ANTERIOR NECK
LOCATION SIMPLE: RIGHT LOWER BACK
LOCATION SIMPLE: ABDOMEN
LOCATION SIMPLE: LEFT UPPER ARM
LOCATION SIMPLE: LEFT FOOT
LOCATION SIMPLE: UPPER BACK

## 2025-08-29 ASSESSMENT — LOCATION DETAILED DESCRIPTION DERM
LOCATION DETAILED: LEFT SUPERIOR LATERAL MALAR CHEEK
LOCATION DETAILED: EPIGASTRIC SKIN
LOCATION DETAILED: RIGHT INFERIOR MEDIAL MIDBACK
LOCATION DETAILED: RIGHT CLAVICULAR NECK
LOCATION DETAILED: LEFT 5TH TOE
LOCATION DETAILED: SUPERIOR THORACIC SPINE
LOCATION DETAILED: SUPERIOR LUMBAR SPINE
LOCATION DETAILED: LEFT ANTERIOR PROXIMAL UPPER ARM

## 2025-08-29 ASSESSMENT — LOCATION ZONE DERM
LOCATION ZONE: TOE
LOCATION ZONE: NECK
LOCATION ZONE: FACE
LOCATION ZONE: TRUNK
LOCATION ZONE: ARM

## 2025-09-03 ENCOUNTER — HOSPITAL ENCOUNTER (OUTPATIENT)
Dept: CARDIOLOGY | Facility: CLINIC | Age: 70
Discharge: HOME | End: 2025-09-03
Attending: NURSE PRACTITIONER
Payer: MEDICARE

## 2025-09-03 DIAGNOSIS — I48.0 PAROXYSMAL ATRIAL FIBRILLATION (H): ICD-10-CM

## 2025-09-03 DIAGNOSIS — I51.9 LV DYSFUNCTION: ICD-10-CM

## 2025-09-03 LAB — LVEF ECHO: NORMAL

## 2025-09-03 PROCEDURE — 93306 TTE W/DOPPLER COMPLETE: CPT | Mod: 26 | Performed by: INTERNAL MEDICINE

## 2025-09-03 PROCEDURE — 93306 TTE W/DOPPLER COMPLETE: CPT

## (undated) RX ORDER — GLYCOPYRROLATE 0.2 MG/ML
INJECTION, SOLUTION INTRAMUSCULAR; INTRAVENOUS
Status: DISPENSED
Start: 2024-09-27

## (undated) RX ORDER — FLUMAZENIL 0.1 MG/ML
INJECTION, SOLUTION INTRAVENOUS
Status: DISPENSED
Start: 2024-09-27

## (undated) RX ORDER — LIDOCAINE 50 MG/G
OINTMENT TOPICAL
Status: DISPENSED
Start: 2024-09-27

## (undated) RX ORDER — NALOXONE HYDROCHLORIDE 0.4 MG/ML
INJECTION, SOLUTION INTRAMUSCULAR; INTRAVENOUS; SUBCUTANEOUS
Status: DISPENSED
Start: 2024-09-27

## (undated) RX ORDER — FENTANYL CITRATE 50 UG/ML
INJECTION, SOLUTION INTRAMUSCULAR; INTRAVENOUS
Status: DISPENSED
Start: 2024-09-27